# Patient Record
Sex: FEMALE | Race: WHITE | Employment: FULL TIME | URBAN - METROPOLITAN AREA
[De-identification: names, ages, dates, MRNs, and addresses within clinical notes are randomized per-mention and may not be internally consistent; named-entity substitution may affect disease eponyms.]

---

## 2017-01-03 ENCOUNTER — TRANSFERRED RECORDS (OUTPATIENT)
Dept: HEALTH INFORMATION MANAGEMENT | Facility: CLINIC | Age: 57
End: 2017-01-03

## 2017-01-05 ENCOUNTER — HOSPITAL ENCOUNTER (OUTPATIENT)
Dept: OCCUPATIONAL THERAPY | Facility: CLINIC | Age: 57
Setting detail: THERAPIES SERIES
End: 2017-01-05
Attending: FAMILY MEDICINE
Payer: COMMERCIAL

## 2017-01-05 PROCEDURE — 40000445 ZZHC STATISTIC OT VISIT, LYMPHEDEMA

## 2017-01-05 PROCEDURE — 97140 MANUAL THERAPY 1/> REGIONS: CPT | Mod: GO

## 2017-01-09 DIAGNOSIS — C50.812 MALIGNANT NEOPLASM OF OVERLAPPING SITES OF LEFT FEMALE BREAST (H): Primary | ICD-10-CM

## 2017-01-09 RX ORDER — TRAMADOL HYDROCHLORIDE 50 MG/1
50 TABLET ORAL EVERY 6 HOURS PRN
Qty: 60 TABLET | Refills: 1 | COMMUNITY
Start: 2017-01-09 | End: 2017-02-20

## 2017-01-09 NOTE — TELEPHONE ENCOUNTER
Patient called left message on her mobile cell phone that Dr. Jasso authorized a refill of her Tamadol. Patient informed that it was called in to [facility]. Kary Schultz RN

## 2017-01-26 ENCOUNTER — HOSPITAL ENCOUNTER (OUTPATIENT)
Dept: OCCUPATIONAL THERAPY | Facility: CLINIC | Age: 57
Setting detail: THERAPIES SERIES
End: 2017-01-26
Attending: FAMILY MEDICINE
Payer: COMMERCIAL

## 2017-01-26 PROCEDURE — 97140 MANUAL THERAPY 1/> REGIONS: CPT | Mod: GO

## 2017-01-26 PROCEDURE — 40000445 ZZHC STATISTIC OT VISIT, LYMPHEDEMA

## 2017-02-02 ENCOUNTER — HOSPITAL ENCOUNTER (OUTPATIENT)
Dept: OCCUPATIONAL THERAPY | Facility: CLINIC | Age: 57
Setting detail: THERAPIES SERIES
End: 2017-02-02
Attending: FAMILY MEDICINE
Payer: COMMERCIAL

## 2017-02-02 PROCEDURE — 40000445 ZZHC STATISTIC OT VISIT, LYMPHEDEMA

## 2017-02-02 PROCEDURE — 97140 MANUAL THERAPY 1/> REGIONS: CPT | Mod: GO

## 2017-02-06 ENCOUNTER — INFUSION THERAPY VISIT (OUTPATIENT)
Dept: INFUSION THERAPY | Facility: CLINIC | Age: 57
End: 2017-02-06
Attending: INTERNAL MEDICINE
Payer: COMMERCIAL

## 2017-02-06 DIAGNOSIS — C50.919 BREAST CANCER, FEMALE (H): Primary | ICD-10-CM

## 2017-02-06 PROCEDURE — 25000128 H RX IP 250 OP 636: Performed by: INTERNAL MEDICINE

## 2017-02-06 PROCEDURE — 96523 IRRIG DRUG DELIVERY DEVICE: CPT

## 2017-02-06 RX ORDER — HEPARIN SODIUM (PORCINE) LOCK FLUSH IV SOLN 100 UNIT/ML 100 UNIT/ML
5 SOLUTION INTRAVENOUS ONCE
Status: COMPLETED | OUTPATIENT
Start: 2017-02-06 | End: 2017-02-06

## 2017-02-06 RX ADMIN — SODIUM CHLORIDE, PRESERVATIVE FREE 5 ML: 5 INJECTION INTRAVENOUS at 14:34

## 2017-02-06 NOTE — PROGRESS NOTES
Infusion Nursing Note:  Kita Smith presents today for port fluh   Patient seen by provider today: No   present during visit today: Not Applicable.    Note: N/A.    Intravenous Access:  Implanted Port.    Treatment Conditions:  Not Applicable.      Post Infusion Assessment:  Blood return noted pre and post infusion.  Site patent and intact, free from redness, edema or discomfort.  No evidence of extravasations.  Access discontinued per protocol.    Discharge Plan:   AVS to patient via MYCHART.  Patient will return in 1 month for next appointment.   Patient discharged in stable condition accompanied by: self.  Departure Mode: Ambulatory.    Adriane Gonzalez RN

## 2017-02-09 ENCOUNTER — HOSPITAL ENCOUNTER (OUTPATIENT)
Dept: OCCUPATIONAL THERAPY | Facility: CLINIC | Age: 57
Setting detail: THERAPIES SERIES
End: 2017-02-09
Attending: FAMILY MEDICINE
Payer: COMMERCIAL

## 2017-02-09 PROCEDURE — 40000445 ZZHC STATISTIC OT VISIT, LYMPHEDEMA

## 2017-02-09 PROCEDURE — 97140 MANUAL THERAPY 1/> REGIONS: CPT | Mod: GO

## 2017-02-16 ENCOUNTER — HOSPITAL ENCOUNTER (OUTPATIENT)
Dept: OCCUPATIONAL THERAPY | Facility: CLINIC | Age: 57
Setting detail: THERAPIES SERIES
End: 2017-02-16
Attending: FAMILY MEDICINE
Payer: COMMERCIAL

## 2017-02-16 PROCEDURE — 97140 MANUAL THERAPY 1/> REGIONS: CPT | Mod: GO

## 2017-02-16 PROCEDURE — 40000445 ZZHC STATISTIC OT VISIT, LYMPHEDEMA

## 2017-02-17 ENCOUNTER — TELEPHONE (OUTPATIENT)
Dept: ONCOLOGY | Facility: CLINIC | Age: 57
End: 2017-02-17

## 2017-02-17 DIAGNOSIS — L60.9 LOOSE TOENAIL: Primary | ICD-10-CM

## 2017-02-17 DIAGNOSIS — C50.812 MALIGNANT NEOPLASM OF OVERLAPPING SITES OF LEFT FEMALE BREAST (H): ICD-10-CM

## 2017-02-20 RX ORDER — TRAMADOL HYDROCHLORIDE 50 MG/1
50 TABLET ORAL EVERY 6 HOURS PRN
Qty: 60 TABLET | Refills: 1 | COMMUNITY
Start: 2017-02-20 | End: 2017-03-07

## 2017-02-20 NOTE — TELEPHONE ENCOUNTER
Pt called requesting a refill of her Tramadol for 2 months to [facility] in [place].    Pt also reports having nail lifting on her both of her big toes.    Encouraged Pt to see a podiatrist for her toenail lifting.    Will see if  will approve reorder of Pt's Tramadol as  is out-of-the-office.

## 2017-02-23 ENCOUNTER — HOSPITAL ENCOUNTER (OUTPATIENT)
Dept: OCCUPATIONAL THERAPY | Facility: CLINIC | Age: 57
Setting detail: THERAPIES SERIES
End: 2017-02-23
Attending: FAMILY MEDICINE
Payer: COMMERCIAL

## 2017-02-23 ENCOUNTER — TRANSFERRED RECORDS (OUTPATIENT)
Dept: HEALTH INFORMATION MANAGEMENT | Facility: CLINIC | Age: 57
End: 2017-02-23

## 2017-02-23 PROCEDURE — 97140 MANUAL THERAPY 1/> REGIONS: CPT | Mod: GO

## 2017-02-23 PROCEDURE — 40000445 ZZHC STATISTIC OT VISIT, LYMPHEDEMA

## 2017-02-28 ENCOUNTER — OFFICE VISIT (OUTPATIENT)
Dept: PODIATRY | Facility: CLINIC | Age: 57
End: 2017-02-28
Payer: COMMERCIAL

## 2017-02-28 VITALS — BODY MASS INDEX: 21.55 KG/M2 | WEIGHT: 134.1 LBS | HEIGHT: 66 IN

## 2017-02-28 DIAGNOSIS — L60.3 ONYCHODYSTROPHY: Primary | ICD-10-CM

## 2017-02-28 PROCEDURE — 99203 OFFICE O/P NEW LOW 30 MIN: CPT | Performed by: PODIATRIST

## 2017-02-28 RX ORDER — CHOLECALCIFEROL (VITAMIN D3) 1250 MCG
50000 CAPSULE ORAL
Status: ON HOLD | COMMUNITY
Start: 2015-06-24 | End: 2017-03-16

## 2017-02-28 RX ORDER — MULTIVITAMIN,THER AND MINERALS
TABLET ORAL
COMMUNITY
Start: 2015-06-24 | End: 2017-07-21

## 2017-02-28 RX ORDER — ZOLPIDEM TARTRATE 10 MG/1
10 TABLET ORAL
COMMUNITY
Start: 2017-01-31 | End: 2017-07-21

## 2017-02-28 RX ORDER — AMITRIPTYLINE HYDROCHLORIDE 10 MG/1
10-20 TABLET ORAL
COMMUNITY
Start: 2016-05-16 | End: 2017-07-21

## 2017-02-28 RX ORDER — MULTIVITAMIN WITH IRON
TABLET ORAL
COMMUNITY
Start: 2015-06-24

## 2017-02-28 NOTE — NURSING NOTE
"Chief Complaint   Patient presents with     Toenail     Bilateral 1st toenails lifting off nail bed       Initial Ht 5' 6\" (1.676 m)  Wt 134 lb 1.6 oz (60.8 kg)  LMP 12/01/2006  BMI 21.64 kg/m2 Estimated body mass index is 21.64 kg/(m^2) as calculated from the following:    Height as of this encounter: 5' 6\" (1.676 m).    Weight as of this encounter: 134 lb 1.6 oz (60.8 kg).  Medication Reconciliation: genaro Link MA February 28, 2017 3:25 PM      "

## 2017-02-28 NOTE — MR AVS SNAPSHOT
After Visit Summary   2/28/2017    Kita Smith    MRN: [id number]           Patient Information     Date Of Birth          1960        Visit Information        Provider Department      2/28/2017 3:20 PM Hira Meraz DPM [facility]        Today's Diagnoses     Onychodystrophy    -  1      Care Instructions    Follow up if nails are not improving.          Follow-ups after your visit        Follow-up notes from your care team     Return if symptoms worsen or fail to improve.      Your next 10 appointments already scheduled     Mar 07, 2017  8:00 AM CST   Level 1 with  INFUSION CHAIR 4   [facility] ([facility])    [place] [id number]  [place] [address]   [phone]            Mar 07, 2017  8:30 AM CST   Return Visit with Preeti Jasso MD   [facility] ([facility])    [facility]  [address]  [place] [address]   [phone]            Mar 09, 2017  2:15 PM CST   Lymphedema Treatment with Tamar Elias, OT   [facility] Lymphedema OT ([facility])    [address]  [address]  [facility] [address]   [phone]            Mar 23, 2017  2:15 PM CDT   Lymphedema Treatment with Tamar Elias, OT   [facility] Lymphedema OT ([facility])    [address]  [address]  [facility] [address]   [phone]            May 26, 2017  3:30 PM CDT   Level O with  INFUSION CHAIR 2   [facility] ([facility])    [facility]  [address]  [place] [address]   [phone]            May 26, 2017  4:00 PM CDT   Return Visit with Preeti Jasso MD   [facility] ([facility])    [facility]  [address]  [place] [address]   [phone]  "             Who to contact     If you have questions or need follow up information about today's clinic visit or your schedule please contact [facility] directly at [phone].  Normal or non-critical lab and imaging results will be communicated to you by MyChart, letter or phone within 4 business days after the clinic has received the results. If you do not hear from us within 7 days, please contact the clinic through MyChart or phone. If you have a critical or abnormal lab result, we will notify you by phone as soon as possible.  Submit refill requests through [employer] or call your pharmacy and they will forward the refill request to us. Please allow 3 business days for your refill to be completed.          Additional Information About Your Visit        Xecced[employer]ivela Information     [employer] lets you send messages to your doctor, view your test results, renew your prescriptions, schedule appointments and more. To sign up, go to [email or web address] . Click on \"Log in\" on the left side of the screen, which will take you to the Welcome page. Then click on \"Sign up Now\" on the right side of the page.     You will be asked to enter the access code listed below, as well as some personal information. Please follow the directions to create your username and password.     Your access code is: [email or web address]  Expires: 2017  3:38 PM     Your access code will  in 90 days. If you need help or a new code, please call your [facility] clinic or [phone].        Care EveryWhere ID     This is your Care EveryWhere ID. This could be used by other organizations to access your [facility] medical records  [id number]        Your Vitals Were     Height Last Period BMI (Body Mass Index)             5' 6\" (1.676 m) 2006 21.64 kg/m2          Blood Pressure from Last 3 Encounters:   16 119/81   16 119/80   16 116/76    Weight from Last 3 Encounters:   17 134 lb 1.6 oz (60.8 kg)   16 " 143 lb 6.4 oz (65 kg)   11/25/16 143 lb 6.4 oz (65 kg)              Today, you had the following     No orders found for display       Primary Care Provider Office Phone # Fax #    Checo Crockett [phone]       [facility] [address] [place] [address] [facility] [address]        Thank you!     Thank you for choosing [facility]  for your care. Our goal is always to provide you with excellent care. Hearing back from our patients is one way we can continue to improve our services. Please take a few minutes to complete the written survey that you may receive in the mail after your visit with us. Thank you!             Your Updated Medication List - Protect others around you: Learn how to safely use, store and throw away your medicines at www.disposemymeds.org.          This list is accurate as of: 2/28/17  3:38 PM.  Always use your most recent med list.                   Brand Name Dispense Instructions for use    amitriptyline 10 MG tablet    ELAVIL     Take 10-20 mg by mouth Reported on 2/28/2017       B Complex-C Tabs          calcium carbonate 500 MG chewable tablet    TUMS     Take 1 chew tab by mouth daily Reported on 2/28/2017       coenzyme Q-10 10 MG Caps      Take 200 mg by mouth       dexamethasone 4 MG tablet    DECADRON    10 tablet    Take 2 tablets (8 mg) by mouth 2 times daily (with meals) Start evening AFTER Docetaxel dose and continue for 4 additional doses.       FIRST-MOUTHWASH BLM Susp     237 mL    Swish and swallow 5-10 mLs in mouth every 6 hours as needed       lidocaine-prilocaine cream    EMLA    30 g    Apply to port site 1 hour prior to accessing port       LORazepam 0.5 MG tablet    ATIVAN    30 tablet    Take 1 tablet (0.5 mg) by mouth every 4 hours as needed (Anxiety, Nausea/Vomiting or Sleep)       olopatadine 0.1 % ophthalmic solution    PATANOL     Place 1 drop into both eyes 2 times daily as needed for allergies       omeprazole 40 MG  capsule    priLOSEC    90 capsule    Take 1 capsule (40 mg) by mouth daily Take 30-60 minutes before a meal.       ondansetron 8 MG tablet    ZOFRAN    30 tablet    Take 1 tablet (8 mg) by mouth every 8 hours as needed for nausea       traMADol 50 MG tablet    ULTRAM    60 tablet    Take 1 tablet (50 mg) by mouth every 6 hours as needed for pain (maximum 8 tablets per day)       vitamin  s/Minerals Tabs          vitamin D3 91675 UNITS capsule    CHOLECALCIFEROL     Take 50,000 Units by mouth       ZOFRAN ODT PO      Take 4 mg by mouth Reported on 2/28/2017       * ZOLPIDEM TARTRATE PO      Take 10 mg by mouth At Bedtime Reported on 2/28/2017       * zolpidem 10 MG tablet    AMBIEN     Take 10 mg by mouth       * Notice:  This list has 2 medication(s) that are the same as other medications prescribed for you. Read the directions carefully, and ask your doctor or other care provider to review them with you.

## 2017-02-28 NOTE — PROGRESS NOTES
PATIENT HISTORY:  Kita Smith is a 56 year old female who presents to clinic for b/l great toenail loosening, discoloration.  Pt completed another round of chemotherapy, ended last November.  Changes noted around that time.  Denies pain today.  She reports neuropathy.  Reports epsom salt soaks, massage.  No change.  Wondering if anything needs to be done.       Review of Systems:  Patient denies fever, chills, rash, wound, stiffness, limping, weakness, heart burn, blood in stool, chest pain with activity, calf pain when walking, shortness of breath with activity, chronic cough, easy bleeding/bruising, swelling of ankles, excessive thirst, depression.  Patient admits to fatigue, numbness, anxiety.     PAST MEDICAL HISTORY:   Past Medical History   Diagnosis Date     Basal cell carcinoma      Breast CA (H)      Depression with anxiety      Histoplasmosis      Malignant melanoma (H)      PONV (postoperative nausea and vomiting)         PAST SURGICAL HISTORY:   Past Surgical History   Procedure Laterality Date     C rad resec tonsil/pillars       General surgery                           date:   &     C section     Hemorrhoidectomy       Mastectomy        section       times 2     Proctoplasty  2013     Procedure: PROCTOPLASTY;  PROCTOPLASTY TO REPAIR ANAL FISSURE AND STENOSIS;  Surgeon: Goldberg, Stanley Morton, MD;  Location:  [facility]     Breast surgery       bilat mastectomy       Hc revise breast reconstruction       Cl aff surgical pathology       left wrist     Melanoma removal[       right sided neck     Davinci hysterectomy total, bilateral salpingo-oophorectomy, combined  2013     Procedure: COMBINED DAVINCI HYSTERECTOMY TOTAL, SALPINGO-OOPHORECTOMY;  ROBOTIC ASSISTED TOTAL LAPAROSCOPIC HYSTERECTOMY, BILATERAL SALPINGO OOPHORECTOMY, PARTIAL VULVECTOMY ;  Surgeon: Hira Jenkins MD;  Location:  OR     Vulvectomy simple  2013     Procedure: VULVECTOMY  SIMPLE;;  Surgeon: Hira Jenkins MD;  Location:  OR     Mohs micrographic procedure       Biopsy of skin lesion       Dissect lymph node axilla Left 6/27/2016     Procedure: DISSECT LYMPH NODE AXILLA;  Surgeon: Hebert Driscoll MD;  Location:  OR     Insert port vascular access Right 6/27/2016     Procedure: INSERT PORT VASCULAR ACCESS;  Surgeon: Hebert Driscoll MD;  Location:  OR        MEDICATIONS:   Current Outpatient Prescriptions:      B Complex-C TABS, , Disp: , Rfl:      vitamin D3 (CHOLECALCIFEROL) 43802 UNITS capsule, Take 50,000 Units by mouth, Disp: , Rfl:      coenzyme Q-10 10 MG CAPS, Take 200 mg by mouth, Disp: , Rfl:      vitamin  s/Minerals TABS, , Disp: , Rfl:      zolpidem (AMBIEN) 10 MG tablet, Take 10 mg by mouth, Disp: , Rfl:      traMADol (ULTRAM) 50 MG tablet, Take 1 tablet (50 mg) by mouth every 6 hours as needed for pain (maximum 8 tablets per day), Disp: 60 tablet, Rfl: 1     LORazepam (ATIVAN) 0.5 MG tablet, Take 1 tablet (0.5 mg) by mouth every 4 hours as needed (Anxiety, Nausea/Vomiting or Sleep), Disp: 30 tablet, Rfl: 2     lidocaine-prilocaine (EMLA) cream, Apply to port site 1 hour prior to accessing port, Disp: 30 g, Rfl: 3     olopatadine (PATANOL) 0.1 % ophthalmic solution, Place 1 drop into both eyes 2 times daily as needed for allergies, Disp: , Rfl:      amitriptyline (ELAVIL) 10 MG tablet, Take 10-20 mg by mouth Reported on 2/28/2017, Disp: , Rfl:      dexamethasone (DECADRON) 4 MG tablet, Take 2 tablets (8 mg) by mouth 2 times daily (with meals) Start evening AFTER Docetaxel dose and continue for 4 additional doses. (Patient not taking: Reported on 2/28/2017), Disp: 10 tablet, Rfl: 0     calcium carbonate (TUMS) 500 MG chewable tablet, Take 1 chew tab by mouth daily Reported on 2/28/2017, Disp: , Rfl:      omeprazole (PRILOSEC) 40 MG capsule, Take 1 capsule (40 mg) by mouth daily Take 30-60 minutes before a meal. (Patient not taking: Reported on  "2/28/2017), Disp: 90 capsule, Rfl: 3     DPH-Lido-AlHydr-MgHydr-Simeth (FIRST-MOUTHWASH BLM) SUSP, Swish and swallow 5-10 mLs in mouth every 6 hours as needed (Patient not taking: Reported on 2/28/2017), Disp: 237 mL, Rfl: 1     ondansetron (ZOFRAN) 8 MG tablet, Take 1 tablet (8 mg) by mouth every 8 hours as needed for nausea (Patient not taking: Reported on 2/28/2017), Disp: 30 tablet, Rfl: 3     Ondansetron (ZOFRAN ODT PO), Take 4 mg by mouth Reported on 2/28/2017, Disp: , Rfl:      ZOLPIDEM TARTRATE PO, Take 10 mg by mouth At Bedtime Reported on 2/28/2017, Disp: , Rfl:      ALLERGIES:    Allergies   Allergen Reactions     Percocet [Oxycodone-Acetaminophen] Nausea     Adhesive Tape Blisters     REDNESS,  bandaides     Hydrocodone Nausea and Vomiting     Wound Dressing Adhesive         SOCIAL HISTORY:   Social History     Social History     Marital status: Single     Spouse name: N/A     Number of children: N/A     Years of education: N/A     Occupational History     Not on file.     Social History Main Topics     Smoking status: Former Smoker     Packs/day: 1.50     Years: 4.00     Types: Cigarettes     Quit date: 1/11/1980     Smokeless tobacco: Never Used     Alcohol use Yes      Comment: occasionally     Drug use: No     Sexual activity: Not on file     Other Topics Concern     Not on file     Social History Narrative        FAMILY HISTORY:   Family History   Problem Relation Age of Onset     CANCER Mother      brain     Other Cancer Mother      Cancer - colorectal Father      Hypertension Father      Colon Cancer Father      CANCER Maternal Aunt      breast     CANCER Other      breast in cousin     Other Cancer Sister      Other Cancer Maternal Grandfather      Breast Cancer Paternal Grandmother      Other Cancer Paternal Grandfather         EXAM:Vitals: Ht 5' 6\" (1.676 m)  Wt 134 lb 1.6 oz (60.8 kg)  LMP 12/01/2006  BMI 21.64 kg/m2  BMI= Body mass index is 21.64 kg/(m^2).    General appearance: Patient is " alert and fully cooperative with history & exam.  No sign of distress is noted during the visit.     Psychiatric: Affect is pleasant & appropriate.  Patient appears motivated to improve health.     Respiratory: Breathing is regular & unlabored while sitting.     HEENT: Hearing is intact to spoken word.  Speech is clear.  No gross evidence of visual impairment that would impact ambulation.     Dermatologic: b/l great toenails with some distal lifting from skin, which appears intact underneath.  Related discoloration, mild subungual bruising.  Overall nails appear well adhered.  No paronychia or evidence of soft tissue infection is noted.     Vascular: DP & PT pulses are intact & regular bilaterally.  No significant edema or varicosities noted.  CFT and skin temperature are normal to both lower extremities.     Neurologic: Lower extremity sensation is intact to light touch.  No evidence of weakness or contracture in the lower extremities.  No evidence of neuropathy.     Musculoskeletal: Patient is ambulatory without assistive device or brace.  No gross ankle deformity noted.  No foot or ankle joint effusion is noted.     ASSESSMENT: b/l onychodystrophy     PLAN:  Reviewed patient's chart in epic.  Discussed condition and treatment options including pros and cons.    Discussed nail changes are likely permanent from chemo.  Discussed risk of losing the nails.  Advised she monitor for clearing as new nails grow, which can take several months.  F/u as needed if not clearing/improving.  Discussed sometimes nail avulsion to eval underlying skin is needed, but I would avoid this for her, especially given recent chemo.  Pt will monitor.  No intervention required at this time.  Call with concerns.     Hira Meraz, IVANA, FACFAS

## 2017-03-07 ENCOUNTER — INFUSION THERAPY VISIT (OUTPATIENT)
Dept: INFUSION THERAPY | Facility: CLINIC | Age: 57
End: 2017-03-07
Attending: INTERNAL MEDICINE
Payer: COMMERCIAL

## 2017-03-07 ENCOUNTER — ONCOLOGY VISIT (OUTPATIENT)
Dept: ONCOLOGY | Facility: CLINIC | Age: 57
End: 2017-03-07
Attending: INTERNAL MEDICINE
Payer: COMMERCIAL

## 2017-03-07 ENCOUNTER — HOSPITAL ENCOUNTER (OUTPATIENT)
Facility: CLINIC | Age: 57
Setting detail: SPECIMEN
Discharge: HOME | End: 2017-03-07
Attending: INTERNAL MEDICINE | Admitting: INTERNAL MEDICINE
Payer: COMMERCIAL

## 2017-03-07 VITALS
TEMPERATURE: 99.1 F | BODY MASS INDEX: 21.47 KG/M2 | HEART RATE: 72 BPM | SYSTOLIC BLOOD PRESSURE: 138 MMHG | DIASTOLIC BLOOD PRESSURE: 95 MMHG | OXYGEN SATURATION: 94 % | RESPIRATION RATE: 16 BRPM | WEIGHT: 133 LBS

## 2017-03-07 VITALS
TEMPERATURE: 99.1 F | BODY MASS INDEX: 21.43 KG/M2 | RESPIRATION RATE: 16 BRPM | DIASTOLIC BLOOD PRESSURE: 95 MMHG | WEIGHT: 132.8 LBS | HEART RATE: 72 BPM | SYSTOLIC BLOOD PRESSURE: 138 MMHG | OXYGEN SATURATION: 94 %

## 2017-03-07 DIAGNOSIS — C50.812 MALIGNANT NEOPLASM OF OVERLAPPING SITES OF LEFT FEMALE BREAST (H): ICD-10-CM

## 2017-03-07 DIAGNOSIS — C50.919 METASTATIC BREAST CANCER: ICD-10-CM

## 2017-03-07 DIAGNOSIS — C50.812 MALIGNANT NEOPLASM OF OVERLAPPING SITES OF LEFT FEMALE BREAST (H): Primary | ICD-10-CM

## 2017-03-07 LAB
ALBUMIN SERPL-MCNC: 3.6 G/DL (ref 3.4–5)
ALP SERPL-CCNC: 64 U/L (ref 40–150)
ALT SERPL W P-5'-P-CCNC: 18 U/L (ref 0–50)
ANION GAP SERPL CALCULATED.3IONS-SCNC: 6 MMOL/L (ref 3–14)
AST SERPL W P-5'-P-CCNC: 16 U/L (ref 0–45)
BASOPHILS # BLD AUTO: 0 10E9/L (ref 0–0.2)
BASOPHILS NFR BLD AUTO: 0.7 %
BILIRUB SERPL-MCNC: 0.4 MG/DL (ref 0.2–1.3)
BUN SERPL-MCNC: 14 MG/DL (ref 7–30)
CALCIUM SERPL-MCNC: 8.7 MG/DL (ref 8.5–10.1)
CANCER AG27-29 SERPL-ACNC: 21 U/ML (ref 0–39)
CHLORIDE SERPL-SCNC: 106 MMOL/L (ref 94–109)
CO2 SERPL-SCNC: 27 MMOL/L (ref 20–32)
CREAT SERPL-MCNC: 0.8 MG/DL (ref 0.52–1.04)
DIFFERENTIAL METHOD BLD: ABNORMAL
EOSINOPHIL # BLD AUTO: 0.1 10E9/L (ref 0–0.7)
EOSINOPHIL NFR BLD AUTO: 2.1 %
ERYTHROCYTE [DISTWIDTH] IN BLOOD BY AUTOMATED COUNT: 12.5 % (ref 10–15)
GFR SERPL CREATININE-BSD FRML MDRD: 74 ML/MIN/1.7M2
GLUCOSE SERPL-MCNC: 80 MG/DL (ref 70–99)
HCT VFR BLD AUTO: 37.7 % (ref 35–47)
HGB BLD-MCNC: 13.1 G/DL (ref 11.7–15.7)
IMM GRANULOCYTES # BLD: 0 10E9/L (ref 0–0.4)
IMM GRANULOCYTES NFR BLD: 0 %
LYMPHOCYTES # BLD AUTO: 0.9 10E9/L (ref 0.8–5.3)
LYMPHOCYTES NFR BLD AUTO: 31.3 %
MCH RBC QN AUTO: 32.3 PG (ref 26.5–33)
MCHC RBC AUTO-ENTMCNC: 34.7 G/DL (ref 31.5–36.5)
MCV RBC AUTO: 93 FL (ref 78–100)
MONOCYTES # BLD AUTO: 0.4 10E9/L (ref 0–1.3)
MONOCYTES NFR BLD AUTO: 14.2 %
NEUTROPHILS # BLD AUTO: 1.5 10E9/L (ref 1.6–8.3)
NEUTROPHILS NFR BLD AUTO: 51.7 %
NRBC # BLD AUTO: 0 10*3/UL
NRBC BLD AUTO-RTO: 0 /100
PLATELET # BLD AUTO: 149 10E9/L (ref 150–450)
POTASSIUM SERPL-SCNC: 4.2 MMOL/L (ref 3.4–5.3)
PROT SERPL-MCNC: 6.9 G/DL (ref 6.8–8.8)
RBC # BLD AUTO: 4.06 10E12/L (ref 3.8–5.2)
SODIUM SERPL-SCNC: 139 MMOL/L (ref 133–144)
WBC # BLD AUTO: 2.8 10E9/L (ref 4–11)

## 2017-03-07 PROCEDURE — 85025 COMPLETE CBC W/AUTO DIFF WBC: CPT | Performed by: INTERNAL MEDICINE

## 2017-03-07 PROCEDURE — 80053 COMPREHEN METABOLIC PANEL: CPT | Performed by: INTERNAL MEDICINE

## 2017-03-07 PROCEDURE — 99211 OFF/OP EST MAY X REQ PHY/QHP: CPT

## 2017-03-07 PROCEDURE — 25000128 H RX IP 250 OP 636: Performed by: INTERNAL MEDICINE

## 2017-03-07 PROCEDURE — 99215 OFFICE O/P EST HI 40 MIN: CPT | Performed by: INTERNAL MEDICINE

## 2017-03-07 PROCEDURE — 36591 DRAW BLOOD OFF VENOUS DEVICE: CPT

## 2017-03-07 PROCEDURE — 86300 IMMUNOASSAY TUMOR CA 15-3: CPT | Performed by: INTERNAL MEDICINE

## 2017-03-07 RX ORDER — TRAMADOL HYDROCHLORIDE 50 MG/1
50 TABLET ORAL EVERY 6 HOURS PRN
Qty: 60 TABLET | Refills: 1 | Status: SHIPPED | OUTPATIENT
Start: 2017-03-07 | End: 2017-05-01

## 2017-03-07 RX ORDER — HEPARIN SODIUM (PORCINE) LOCK FLUSH IV SOLN 100 UNIT/ML 100 UNIT/ML
5 SOLUTION INTRAVENOUS ONCE
Status: COMPLETED | OUTPATIENT
Start: 2017-03-07 | End: 2017-03-07

## 2017-03-07 RX ORDER — HEPARIN SODIUM (PORCINE) LOCK FLUSH IV SOLN 100 UNIT/ML 100 UNIT/ML
5 SOLUTION INTRAVENOUS ONCE
Status: CANCELLED
Start: 2017-03-07 | End: 2017-03-07

## 2017-03-07 RX ORDER — TRAMADOL HYDROCHLORIDE 50 MG/1
50 TABLET ORAL EVERY 6 HOURS PRN
Qty: 60 TABLET | Refills: 1 | Status: SHIPPED | OUTPATIENT
Start: 2017-03-07 | End: 2017-03-07

## 2017-03-07 RX ORDER — ANASTROZOLE 1 MG/1
1 TABLET ORAL DAILY
Qty: 90 TABLET | Refills: 3 | Status: SHIPPED | OUTPATIENT
Start: 2017-03-07 | End: 2017-10-20

## 2017-03-07 RX ADMIN — SODIUM CHLORIDE, PRESERVATIVE FREE 5 ML: 5 INJECTION INTRAVENOUS at 08:20

## 2017-03-07 ASSESSMENT — PAIN SCALES - GENERAL
PAINLEVEL: MILD PAIN (2)
PAINLEVEL: MILD PAIN (2)

## 2017-03-07 NOTE — MR AVS SNAPSHOT
After Visit Summary   3/7/2017    Kita Smith    MRN: [id number]           Patient Information     Date Of Birth          1960        Visit Information        Provider Department      3/7/2017 8:00 AM  INFUSION CHAIR 4 [facility]        Today's Diagnoses     Metastatic breast cancer (H)           Follow-ups after your visit        Your next 10 appointments already scheduled     Mar 09, 2017  2:15 PM CST   Lymphedema Treatment with Tamar Elias, OT   [facility] Lymphedema OT ([facility])    [address] [place] [address]  [address]  [facility] [address]   [phone]            Mar 23, 2017  2:15 PM CDT   Lymphedema Treatment with Tamar Elias, OT   [facility] Lymphedema [facility] ([facility])    [address] [place] [address]  [address]  [facility] [address]   [phone]            May 26, 2017  3:30 PM CDT   Level O with  INFUSION CHAIR 2   [facility] ([facility])    [facility]  [address]  [facility] [address]   [phone]            May 26, 2017  4:00 PM CDT   Return Visit with Preeti Jasso MD   [facility] ([facility])    [facility]  [address]  [facility] [address]   [phone]              Who to contact     If you have questions or need follow up information about today's clinic visit or your schedule please contact [facility] directly at [phone].  Normal or non-critical lab and imaging results will be communicated to you by MyChart, letter or phone within 4 business days after the clinic has received the results. If you do not hear from us within 7 days, please contact the clinic through MyChart or phone. If you have a critical or abnormal lab result, we will notify you by phone as soon as possible.  Submit refill requests through [employer] or  "call your pharmacy and they will forward the refill request to us. Please allow 3 business days for your refill to be completed.          Additional Information About Your Visit        MyChart Information     [employer] lets you send messages to your doctor, view your test results, renew your prescriptions, schedule appointments and more. To sign up, go to [email or web address] . Click on \"Log in\" on the left side of the screen, which will take you to the Welcome page. Then click on \"Sign up Now\" on the right side of the page.     You will be asked to enter the access code listed below, as well as some personal information. Please follow the directions to create your username and password.     Your access code is: [email or web address]  Expires: 2017  3:38 PM     Your access code will  in 90 days. If you need help or a new code, please call your [facility] clinic or [phone].        Care EveryWhere ID     This is your Care EveryWhere ID. This could be used by other organizations to access your [facility] medical records  [id number]        Your Vitals Were     Pulse Temperature Respirations Last Period Pulse Oximetry BMI (Body Mass Index)    72 99.1  F (37.3  C) (Oral) 16 2006 94% 21.47 kg/m2       Blood Pressure from Last 3 Encounters:   17 (!) 138/95   17 (!) 138/95   16 119/81    Weight from Last 3 Encounters:   17 60.2 kg (132 lb 12.8 oz)   17 60.3 kg (133 lb)   17 60.8 kg (134 lb 1.6 oz)              We Performed the Following     Ca27.29  breast tumor marker     CBC with platelets differential     Comprehensive metabolic panel          Today's Medication Changes          These changes are accurate as of: 3/7/17  8:59 AM.  If you have any questions, ask your nurse or doctor.               Start taking these medicines.        Dose/Directions    anastrozole 1 MG tablet   Commonly known as:  ARIMIDEX   Used for:  Malignant neoplasm of overlapping sites of left female " breast (H)   Started by:  Preeti Jasso MD        Dose:  1 mg   Take 1 tablet (1 mg) by mouth daily   Quantity:  90 tablet   Refills:  3            Where to get your medicines      These medications were sent to [id number] - [facility] [place] [address] [facility] [address] [facility] [address]  [address] [facility], [place] [address]     Phone:  [phone]     anastrozole 1 MG tablet         Some of these will need a paper prescription and others can be bought over the counter.  Ask your nurse if you have questions.     Bring a paper prescription for each of these medications     traMADol 50 MG tablet                Primary Care Provider Office Phone # Fax #    Checo Crockett [phone]       [facility] [address] [place] [address] [place] [address]        Thank you!     Thank you for choosing [facility]  for your care. Our goal is always to provide you with excellent care. Hearing back from our patients is one way we can continue to improve our services. Please take a few minutes to complete the written survey that you may receive in the mail after your visit with us. Thank you!             Your Updated Medication List - Protect others around you: Learn how to safely use, store and throw away your medicines at www.disposemymeds.org.          This list is accurate as of: 3/7/17  8:59 AM.  Always use your most recent med list.                   Brand Name Dispense Instructions for use    amitriptyline 10 MG tablet    ELAVIL     Take 10-20 mg by mouth Reported on 2/28/2017       anastrozole 1 MG tablet    ARIMIDEX    90 tablet    Take 1 tablet (1 mg) by mouth daily       B Complex-C Tabs          calcium carbonate 500 MG chewable tablet    TUMS     Take 1 chew tab by mouth daily Reported on 2/28/2017       coenzyme Q-10 10 MG Caps      Take 200 mg by mouth       dexamethasone 4 MG tablet    DECADRON    10 tablet    Take 2  tablets (8 mg) by mouth 2 times daily (with meals) Start evening AFTER Docetaxel dose and continue for 4 additional doses.       FIRST-MOUTHWASH BLM Susp     237 mL    Swish and swallow 5-10 mLs in mouth every 6 hours as needed       lidocaine-prilocaine cream    EMLA    30 g    Apply to port site 1 hour prior to accessing port       LORazepam 0.5 MG tablet    ATIVAN    30 tablet    Take 1 tablet (0.5 mg) by mouth every 4 hours as needed (Anxiety, Nausea/Vomiting or Sleep)       olopatadine 0.1 % ophthalmic solution    PATANOL     Place 1 drop into both eyes 2 times daily as needed for allergies       omeprazole 40 MG capsule    priLOSEC    90 capsule    Take 1 capsule (40 mg) by mouth daily Take 30-60 minutes before a meal.       ondansetron 8 MG tablet    ZOFRAN    30 tablet    Take 1 tablet (8 mg) by mouth every 8 hours as needed for nausea       traMADol 50 MG tablet    ULTRAM    60 tablet    Take 1 tablet (50 mg) by mouth every 6 hours as needed for pain (maximum 8 tablets per day)       vitamin  s/Minerals Tabs          vitamin D3 92217 UNITS capsule    CHOLECALCIFEROL     Take 50,000 Units by mouth       ZOFRAN ODT PO      Take 4 mg by mouth Reported on 2/28/2017       * ZOLPIDEM TARTRATE PO      Take 10 mg by mouth At Bedtime Reported on 2/28/2017       * zolpidem 10 MG tablet    AMBIEN     Take 10 mg by mouth       * Notice:  This list has 2 medication(s) that are the same as other medications prescribed for you. Read the directions carefully, and ask your doctor or other care provider to review them with you.

## 2017-03-07 NOTE — PROGRESS NOTES
"Kita Smith is a 56 year old female who presents for:  Chief Complaint   Patient presents with     Oncology Clinic Visit     F/U Breast Ca         Initial Vitals:  LMP 12/01/2006 Estimated body mass index is 21.64 kg/(m^2) as calculated from the following:    Height as of 2/28/17: 1.676 m (5' 6\").    Weight as of 2/28/17: 60.8 kg (134 lb 1.6 oz).. There is no height or weight on file to calculate BSA. BP completed using cuff size: regular  Data Unavailable Patient's last menstrual period was 12/01/2006. Allergies and medications reviewed.     Medications: Medication refills not needed today.  Pharmacy name entered into [employer]:    [facility] [address] [place] [address] [id number]  [facility] [place], [place] - [address]    Comments: None    6 minutes for nursing intake (face to face time)   Jerica Montalvo CMA    DISCHARGE PLAN:  1.) Patient to be scheduled for Bone density- prep instructions reviewed with patient  2.) Patient to be scheduled for port removal per surgery  3.) Physical therapy referral-Rehab scheduling to call patient  4.) Patient to be scheduled for labs and follow up with Dr. Jasso in 1 month.  5.) Patient given information on [employer]  Next appointments: See patient instruction section  Departure Mode: Ambulatory  Accompanied by: self  10 minutes for nursing discharge (face to face time)   Kary Schultz RN          "

## 2017-03-07 NOTE — PROGRESS NOTES
Infusion Nursing Note:  Kita Smith presents today for port labs.    Patient seen by provider today: Yes: Dr. Jasso   present during visit today: Not Applicable.    Note: N/A.    Intravenous Access:  Labs drawn without difficulty.  Implanted Port.    Treatment Conditions:  Not Applicable.      Post Infusion Assessment:  Site patent and intact, free from redness, edema or discomfort.  No evidence of extravasations.  Access discontinued per protocol.    Discharge Plan:   Patient discharged in stable condition accompanied by: self.  Departure Mode: Ambulatory to [facility] for clinic exam.    Yaquelin Aguilar RN

## 2017-03-07 NOTE — LETTER
March 7, 2017      RE: Kita Smith  [address] [place] [address]      [facility]    Hematology/Oncology Established Patient Follow-up Note      Today's Date: 03/07/2017    Reason for Follow-up: history of breast cancer and melanoma    HISTORY OF PRESENT ILLNESS: Kita Smith is a 56 year old female who is being followed for adenocarcinoma of the breast as well as melanoma. She was previously followed by Dr. Minor.  Kita is known to be BRCA-2 positive. She has undergone bilateral mastectomy.  She has also undergone hysterectomy and bilateral salpingo-oophorectomy on 04/06/2013 for prophylaxis of ovarian and uterine cancer.     She first presented with multifocal lesions in the left breast in 2006. She underwent left mastectomy on 01/28/2006 demonstrating multifocal infiltrating ductal adenocarcinoma, grade 2, with DCIS. She had 3 separate lesions measuring 1.5 cm, 0.5 cm, and 0.4 cm. All lymph nodes were negative. The tumor was ER/SC positive and HER-2 negative. She received 4 cycles of dose-dense Adriamycin and Cytoxan and was then on tamoxifen through 2012 when it was discontinued.     In the summer of 2012, Kita noted a hyperpigmented lesion over the right neck which was rapidly growing and changing over 1-2 months. The lesion was excised by Dr. Nataliia Baron. The lesion was a Santiago level III melanoma Breslow depth 0.5 mm.   Stage IA.  A wide excision was subsequently performed by Dr. Nataliia Baron, with 1 cm margins and no evidence of residual disease. The patient was subsequently referred to Dr. Childers. A PET-CT showed no evidence of metastatic disease. 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 Bazan of [facility] in March 2016.  This was stage 0.    On a CT scan study done on 10/12/2010 she was noted to have multiple bilateral pulmonary nodules, which were felt to be  granulomatous. These have been followed; these were PET negative. Her most recent CT of the chest done 04/05/2013 showed that the tiny pulmonary nodules were stable over 3 years and therefore these are no longer followed on a routine basis.      PET-CT in June 2016 showed left axillary lymphadenopathy and biopsy of lymph node showed metastatic breast carcinoma, consistent with recurrence.  On 6/27/16, she underwent left lateral breast lesion excision and left axillary dissection.  Pathology showed invasive ductal carcinoma, grade 3, tumor size 1.5 cm +LVI, 6 of 31 lymph nodes were involved, ER positive (75%), TN positive (5%), HER-2 negative.  4 of 29 lymph node were positive in the axillary dissection.  There was excision of a second breast nodule that also had metastatic breast cancer.      She began chemotherapy on 8/11/16, and completed docetaxel+carboplatin x 6 cycles on 11/25/16.  She completed radiation in late February 2017.        INTERIM HISTORY:  Kita comes in for follow-up today.  She has finished radiation.  She says that it got harder towards the end with fatigue.  She wants her port removed.  She still has pain at her left arm and back ever since her surgery, and continues to take tramadol, which helps.        REVIEW OF SYSTEMS:   14 point ROS was reviewed and is negative other than as noted above in HPI.       HOME MEDICATIONS:  Current Outpatient Prescriptions   Medication Sig Dispense Refill     anastrozole (ARIMIDEX) 1 MG tablet Take 1 tablet (1 mg) by mouth daily 90 tablet 3     traMADol (ULTRAM) 50 MG tablet Take 1 tablet (50 mg) by mouth every 6 hours as needed for pain (maximum 8 tablets per day) 60 tablet 1     amitriptyline (ELAVIL) 10 MG tablet Take 10-20 mg by mouth Reported on 2/28/2017       B Complex-C TABS        vitamin D3 (CHOLECALCIFEROL) 54978 UNITS capsule Take 50,000 Units by mouth       coenzyme Q-10 10 MG CAPS Take 200 mg by mouth       vitamin  s/Minerals TABS         zolpidem (AMBIEN) 10 MG tablet Take 10 mg by mouth       dexamethasone (DECADRON) 4 MG tablet Take 2 tablets (8 mg) by mouth 2 times daily (with meals) Start evening AFTER Docetaxel dose and continue for 4 additional doses. 10 tablet 0     calcium carbonate (TUMS) 500 MG chewable tablet Take 1 chew tab by mouth daily Reported on 2017       omeprazole (PRILOSEC) 40 MG capsule Take 1 capsule (40 mg) by mouth daily Take 30-60 minutes before a meal. 90 capsule 3     LORazepam (ATIVAN) 0.5 MG tablet Take 1 tablet (0.5 mg) by mouth every 4 hours as needed (Anxiety, Nausea/Vomiting or Sleep) 30 tablet 2     DPH-Lido-AlHydr-MgHydr-Simeth (FIRST-MOUTHWASH BLM) SUSP Swish and swallow 5-10 mLs in mouth every 6 hours as needed 237 mL 1     lidocaine-prilocaine (EMLA) cream Apply to port site 1 hour prior to accessing port 30 g 3     ondansetron (ZOFRAN) 8 MG tablet Take 1 tablet (8 mg) by mouth every 8 hours as needed for nausea 30 tablet 3     Ondansetron (ZOFRAN ODT PO) Take 4 mg by mouth Reported on 2017       olopatadine (PATANOL) 0.1 % ophthalmic solution Place 1 drop into both eyes 2 times daily as needed for allergies       ZOLPIDEM TARTRATE PO Take 10 mg by mouth At Bedtime Reported on 2017           ALLERGIES:  Allergies   Allergen Reactions     Percocet [Oxycodone-Acetaminophen] Nausea     Adhesive Tape Blisters     REDNESS,  bandaides     Hydrocodone Nausea and Vomiting     Wound Dressing Adhesive          PAST MEDICAL HISTORY:  Past Medical History   Diagnosis Date     Basal cell carcinoma      Breast CA (H)      Depression with anxiety      Histoplasmosis      Malignant melanoma (H)      PONV (postoperative nausea and vomiting)          PAST SURGICAL HISTORY:  Past Surgical History   Procedure Laterality Date     C rad resec tonsil/pillars       General surgery                           date:   &     C section     Hemorrhoidectomy       Mastectomy        section       times 2      Proctoplasty  4/13/2013     Procedure: PROCTOPLASTY;  PROCTOPLASTY TO REPAIR ANAL FISSURE AND STENOSIS;  Surgeon: Goldberg, Stanley Morton, MD;  Location: [facility]     Breast surgery       bilat mastectomy  2006     Hc revise breast reconstruction       Cl aff surgical pathology       left wrist     Melanoma removal[       right sided neck     Davinci hysterectomy total, bilateral salpingo-oophorectomy, combined  4/26/2013     Procedure: COMBINED DAVINCI HYSTERECTOMY TOTAL, SALPINGO-OOPHORECTOMY;  ROBOTIC ASSISTED TOTAL LAPAROSCOPIC HYSTERECTOMY, BILATERAL SALPINGO OOPHORECTOMY, PARTIAL VULVECTOMY ;  Surgeon: Hira Jenkins MD;  Location:  OR     Vulvectomy simple  4/26/2013     Procedure: VULVECTOMY SIMPLE;;  Surgeon: Hira Jenkins MD;  Location:  OR     Mohs micrographic procedure       Biopsy of skin lesion       Dissect lymph node axilla Left 6/27/2016     Procedure: DISSECT LYMPH NODE AXILLA;  Surgeon: Hebert Driscoll MD;  Location:  OR     Insert port vascular access Right 6/27/2016     Procedure: INSERT PORT VASCULAR ACCESS;  Surgeon: Hebert Driscoll MD;  Location:  OR         SOCIAL HISTORY:  Social History     Social History     Marital status: Single     Spouse name: N/A     Number of children: N/A     Years of education: N/A     Occupational History     Not on file.     Social History Main Topics     Smoking status: Former Smoker     Packs/day: 1.50     Years: 4.00     Types: Cigarettes     Quit date: 1/11/1980     Smokeless tobacco: Never Used     Alcohol use Yes      Comment: occasionally     Drug use: No     Sexual activity: Not on file     Other Topics Concern     Not on file     Social History Narrative         FAMILY HISTORY:  Family History   Problem Relation Age of Onset     CANCER Mother      brain     Other Cancer Mother      Cancer - colorectal Father      Hypertension Father      Colon Cancer Father      CANCER Maternal Aunt      breast     CANCER Other       breast in cousin     Other Cancer Sister      Other Cancer Maternal Grandfather      Breast Cancer Paternal Grandmother      Other Cancer Paternal Grandfather          PHYSICAL EXAM:  Vital signs:  BP (!) 138/95  Pulse 72  Temp 99.1  F (37.3  C) (Oral)  Resp 16  Wt 60.2 kg (132 lb 12.8 oz)  LMP 2006  SpO2 94%  BMI 21.43 kg/m2   ECO  GENERAL/CONSTITUTIONAL: No acute distress.  EYES: No scleral icterus.  LYMPH: No anterior cervical, posterior cervical, supraclavicular, or axillary adenopathy.   RESPIRATORY: Clear to auscultation bilaterally. No crackles or wheezing.   CARDIOVASCULAR: Regular rate and rhythm without murmurs, gallops, or rubs.  GASTROINTESTINAL: No tenderness. The patient has normal bowel sounds. No guarding.  No distention.  BREAST: s/p bilateral mastectomy with implants in place.  No palpable chest wall masses.  MUSCULOSKELETAL: Warm and well-perfused, no cyanosis, clubbing, or edema.  NEUROLOGIC: Alert, oriented, answers questions appropriately.  INTEGUMENTARY: No jaundice.  Numerous scattered moles.    GAIT: Steady, does not use assistive device  Port in place at right upper chest.          LABS:  CBC RESULTS:   Recent Labs   Lab Test  17   0820   WBC  2.8*   RBC  4.06   HGB  13.1   HCT  37.7   MCV  93   MCH  32.3   MCHC  34.7   RDW  12.5   PLT  149*     Recent Labs   Lab Test  17   0820  16   0750   NA  139  140   POTASSIUM  4.2  4.0   CHLORIDE  106  106   CO2  27  25   ANIONGAP  6  9   GLC  80  83   BUN  14  16   CR  0.80  0.90   YANG  8.7  8.6     Lab Results   Component Value Date    AST 16 2017     Lab Results   Component Value Date    ALT 18 2017     No results found for: BILICONJ   Lab Results   Component Value Date    BILITOTAL 0.4 2017     Lab Results   Component Value Date    ALBUMIN 3.6 2017     Lab Results   Component Value Date    PROTTOTAL 6.9 2017      Lab Results   Component Value Date    ALKPHOS 64 2017     Component       Latest Ref Rng & Units 9/22/2016 10/13/2016 11/3/2016 11/25/2016   CA 27-29      0 - 39 U/mL 30 30 27 31       ASSESSMENT/PLAN:  Kita Smith is a 56 year old female:    1) Left breast cancer: diagnosed in 2006, s/p bilateral mastectomy and chemotherapy and hormone therapy.  She is known to be BRCA-2 positive and has undergone hysterectomy and bilateral salpingo-oophorectomy on 04/06/2013 for prophylaxis of ovarian and uterine cancer.  Now with recurrence, s/p left lateral breast lesion excision and left axillary dissection on 6/27/16.  Pathology showed invasive ductal carcinoma, grade 3, tumor size 1.5 cm +LVI, 6 of 31 lymph nodes were involved, ER positive (75%), NH positive (5%), HER-2 negative.  4 of 29 lymph node were positive in the axillary dissection.  There was excision of a second breast nodule that also had metastatic breast cancer.      She has completed chemotherapy and radiation.      -labs today  -will start anastrozole 1 mg po daily today - prescription sent  -we discussed potential side effects, and information handout was given to patient with counseling  -patient wants port removed - referred back to Dr. Driscoll  -[facility] in 1 month for toxicity check    2) Left arm and back pain: started after surgery, and worsened on chemotherapy and radiation  -continue Tramadol 50 mg Q6H prn - renewed today  -continue lymphedema therapy  -physical therapy referral made  -if no improvement over the next couple of months, will consider imaging    3) Lymphedema:  -she is undergoing lymphedema therapy    4) Melanoma: She has history of malignant melanoma x 2, stage IA of the right neck and stage 0 of the left temple.  She now sees another dermatologist, Dr. Mak Bolden, and underwent shave biopsies of right lateral neck, atypical nevi of the left posterior arm and left index finger.  She saw Dr. Sierra at [facility], but will not continue to follow her for surveillance.    -she requests to see  "another dermatologist closer to her home, and with [facility] - referral to Dermatology made, and she has seen.  She is following up with them in a couple of weeks.    5) Pulmonary nodules: were found in 2010 that were thought granulomatous and PET negative at the time, and have been stable on subsequent scans.     6) Reflux:  -she tried omeprazole     7) Bone health:  -schedule DEXA scan  -take calcium and vitamin D      I spent a total of 40 minutes with the patient, with over >50% of the time in counseling and/or coordination of care.      Preeti Jasso MD  Hematology/Oncology  [facility]          Kita mSith is a 56 year old female who presents for:  Chief Complaint   Patient presents with     Oncology Clinic Visit     F/U Breast Ca         Initial Vitals:  LMP 12/01/2006 Estimated body mass index is 21.64 kg/(m^2) as calculated from the following:    Height as of 2/28/17: 1.676 m (5' 6\").    Weight as of 2/28/17: 60.8 kg (134 lb 1.6 oz).. There is no height or weight on file to calculate BSA. BP completed using cuff size: regular  Data Unavailable Patient's last menstrual period was 12/01/2006. Allergies and medications reviewed.     Medications: Medication refills not needed today.  Pharmacy name entered into [employer]:    [id number] - [place], [place] - [address] AT [facility] & [facility] [address]  [facility] [place], [place] - [address]    Comments: None    6 minutes for nursing intake (face to face time)   Jerica Montalvo [facility]            Preeti Jasso MD  "

## 2017-03-07 NOTE — MR AVS SNAPSHOT
After Visit Summary   3/7/2017    Kita Smith    MRN: [id number]           Patient Information     Date Of Birth          1960        Visit Information        Provider Department      3/7/2017 8:30 AM Preeti Jasso MD [facility]        Today's Diagnoses     Malignant neoplasm of overlapping sites of left female breast (H)          Care Instructions    -tramadol prescription printed  -anastrozole prescription sent to your pharmacy  -give information on anastrozole  -schedule bone density scan next week  -take calcium 1200 mg a day, and vitamin D 800 mg a day  -schedule port removal with surgery (Dr. Driscoll), next week  -referral to physical therapy  -return to clinic in 1 month        [address]  [place], [place]        Phone:   [phone]      INSTRUCTIONS:     Dexa Bone Density Screening    Please do not take the following 2 days prior to your exam     Vitamins     Calcium Tablets     Antacids    If you have had an x-ray examination with contrast, you must wait 2 weeks before having a bone density exam.                     Follow-ups after your visit        Additional Services     PHYSICAL THERAPY REFERRAL       *This therapy referral will be filtered to a centralized scheduling office at [facility] and the patient will receive a call to schedule an appointment at a [facility] location most convenient for them. *     [facility] provides Physical Therapy evaluation and treatment and many specialty services across the [facility] system.  If requesting a specialty program, please choose from the list below.    If you have not heard from the scheduling office within 2 business days, please call [phone] for all locations, with the exception of [place], please call [phone].  Treatment: Evaluation & Treatment  Special Instructions/Modalities: left arm and back pain after  surgery  Special Programs: Cancer Rehabilitation (PT and OT Evaluate & Treat)    Please be aware that coverage of these services is subject to the terms and limitations of your health insurance plan.  Call member services at your health plan with any benefit or coverage questions.      **Note to Provider:  If you are referring outside of [place] for the therapy appointment, please list the name of the location in the  special instructions  above, print the referral and give to the patient to schedule the appointment.                  Your next 10 appointments already scheduled     Mar 09, 2017  2:15 PM CST   Lymphedema Treatment with Tamar Elias OT   [facility] Lymphedema OT ([facility])    [address]  [facility] [address]   [phone]            Mar 15, 2017  8:30 AM CDT   DX HIP/PELVIS/SPINE with MADX1   [facility] Dexa ([facility])    [address] [place] [address] [address]  [facility] [address]   [phone]           Please do not take any of the following 48 hours prior to your exam: vitamins, calcium tablets, antacids.            Mar 23, 2017  2:15 PM CDT   Lymphedema Treatment with Tamar Elias OT   [facility] Lymphedema OT ([facility])    [address]  [address]  [facility] [address]   [phone]            Apr 21, 2017  8:00 AM CDT   Return Visit with  Oncology Nurse   [facility] ([facility])    [facility]  [address]  [facility] [address]   [phone]            Apr 21, 2017  8:30 AM CDT   Return Visit with Preeti Jasso MD   [facility] ([facility])    [facility]  [address]  [facility] [address]   [phone]              Future tests that were ordered for you today     Open Future Orders        Priority Expected Expires Ordered    DX Hip/Pelvis/Spine Routine  3/7/2018  "3/7/2017            Who to contact     If you have questions or need follow up information about today's clinic visit or your schedule please contact [facility] directly at [phone].  Normal or non-critical lab and imaging results will be communicated to you by MyChart, letter or phone within 4 business days after the clinic has received the results. If you do not hear from us within 7 days, please contact the clinic through MyChart or phone. If you have a critical or abnormal lab result, we will notify you by phone as soon as possible.  Submit refill requests through [employer] or call your pharmacy and they will forward the refill request to us. Please allow 3 business days for your refill to be completed.          Additional Information About Your Visit        [facility]erred [facility] Information     [employer] lets you send messages to your doctor, view your test results, renew your prescriptions, schedule appointments and more. To sign up, go to [email or web address] . Click on \"Log in\" on the left side of the screen, which will take you to the Welcome page. Then click on \"Sign up Now\" on the right side of the page.     You will be asked to enter the access code listed below, as well as some personal information. Please follow the directions to create your username and password.     Your access code is: [email or web address]  Expires: 2017  3:38 PM     Your access code will  in 90 days. If you need help or a new code, please call your [facility] clinic or [phone].        Care EveryWhere ID     This is your Care EveryWhere ID. This could be used by other organizations to access your [facility] medical records  [id number]        Your Vitals Were     Pulse Temperature Respirations Last Period Pulse Oximetry BMI (Body Mass Index)    72 99.1  F (37.3  C) (Oral) 16 2006 94% 21.43 kg/m2       Blood Pressure from Last 3 Encounters:   17 (!) 138/95   17 (!) 138/95   16 119/81    Weight from Last " 3 Encounters:   03/07/17 60.2 kg (132 lb 12.8 oz)   03/07/17 60.3 kg (133 lb)   02/28/17 60.8 kg (134 lb 1.6 oz)              We Performed the Following     PHYSICAL THERAPY REFERRAL          Today's Medication Changes          These changes are accurate as of: 3/7/17  9:33 AM.  If you have any questions, ask your nurse or doctor.               Start taking these medicines.        Dose/Directions    anastrozole 1 MG tablet   Commonly known as:  ARIMIDEX   Used for:  Malignant neoplasm of overlapping sites of left female breast (H)   Started by:  Preeti Jasso MD        Dose:  1 mg   Take 1 tablet (1 mg) by mouth daily   Quantity:  90 tablet   Refills:  3       traMADol 50 MG tablet   Commonly known as:  ULTRAM   Used for:  Malignant neoplasm of overlapping sites of left female breast (H)   Started by:  Preeti Jasso MD        Dose:  50 mg   Take 1 tablet (50 mg) by mouth every 6 hours as needed for pain (maximum 8 tablets per day)   Quantity:  60 tablet   Refills:  1            Where to get your medicines      These medications were sent to [facility] [id number] [place] [address] AT [facility] & [address] [address]     Phone:  [phone]     anastrozole 1 MG tablet         Some of these will need a paper prescription and others can be bought over the counter.  Ask your nurse if you have questions.     Bring a paper prescription for each of these medications     traMADol 50 MG tablet                Primary Care Provider Office Phone # Fax #    Checo Crockett [phone]       [facility] [place] [address] [place] [address]        Thank you!     Thank you for choosing [facility]  for your care. Our goal is always to provide you with excellent care. Hearing back from our patients is one way we can continue to improve our services. Please take a few minutes to complete the written  survey that you may receive in the mail after your visit with us. Thank you!             Your Updated Medication List - Protect others around you: Learn how to safely use, store and throw away your medicines at www.disposemymeds.org.          This list is accurate as of: 3/7/17  9:33 AM.  Always use your most recent med list.                   Brand Name Dispense Instructions for use    amitriptyline 10 MG tablet    ELAVIL     Take 10-20 mg by mouth Reported on 2/28/2017       anastrozole 1 MG tablet    ARIMIDEX    90 tablet    Take 1 tablet (1 mg) by mouth daily       B Complex-C Tabs          calcium carbonate 500 MG chewable tablet    TUMS     Take 1 chew tab by mouth daily Reported on 2/28/2017       coenzyme Q-10 10 MG Caps      Take 200 mg by mouth       dexamethasone 4 MG tablet    DECADRON    10 tablet    Take 2 tablets (8 mg) by mouth 2 times daily (with meals) Start evening AFTER Docetaxel dose and continue for 4 additional doses.       FIRST-MOUTHWASH BLM Susp     237 mL    Swish and swallow 5-10 mLs in mouth every 6 hours as needed       lidocaine-prilocaine cream    EMLA    30 g    Apply to port site 1 hour prior to accessing port       LORazepam 0.5 MG tablet    ATIVAN    30 tablet    Take 1 tablet (0.5 mg) by mouth every 4 hours as needed (Anxiety, Nausea/Vomiting or Sleep)       olopatadine 0.1 % ophthalmic solution    PATANOL     Place 1 drop into both eyes 2 times daily as needed for allergies       omeprazole 40 MG capsule    priLOSEC    90 capsule    Take 1 capsule (40 mg) by mouth daily Take 30-60 minutes before a meal.       ondansetron 8 MG tablet    ZOFRAN    30 tablet    Take 1 tablet (8 mg) by mouth every 8 hours as needed for nausea       traMADol 50 MG tablet    ULTRAM    60 tablet    Take 1 tablet (50 mg) by mouth every 6 hours as needed for pain (maximum 8 tablets per day)       vitamin  s/Minerals Tabs          vitamin D3 56458 UNITS capsule    CHOLECALCIFEROL     Take 50,000 Units by  mouth       ZOFRAN ODT PO      Take 4 mg by mouth Reported on 2/28/2017       * ZOLPIDEM TARTRATE PO      Take 10 mg by mouth At Bedtime Reported on 2/28/2017       * zolpidem 10 MG tablet    AMBIEN     Take 10 mg by mouth       * Notice:  This list has 2 medication(s) that are the same as other medications prescribed for you. Read the directions carefully, and ask your doctor or other care provider to review them with you.

## 2017-03-07 NOTE — PATIENT INSTRUCTIONS
-tramadol prescription printed  -anastrozole prescription sent to your pharmacy  -give information on anastrozole  -schedule bone density scan next week  -take calcium 1200 mg a day, and vitamin D 800 mg a day  -schedule port removal with surgery (Dr. Driscoll), next week  -referral to physical therapy  -return to clinic in 1 month        [facility]   [address]  [place], [place]        Phone:   [phone]      INSTRUCTIONS:     Dexa Bone Density Screening    Please do not take the following 2 days prior to your exam     Vitamins     Calcium Tablets     Antacids    If you have had an x-ray examination with contrast, you must wait 2 weeks before having a bone density exam.

## 2017-03-07 NOTE — PROGRESS NOTES
[facility]    Hematology/Oncology Established Patient Follow-up Note      Today's Date: 03/07/2017    Reason for Follow-up: history of breast cancer and melanoma    HISTORY OF PRESENT ILLNESS: Kita Smith is a 56 year old female who is being followed for adenocarcinoma of the breast as well as melanoma. She was previously followed by Dr. Minor.  Kita is known to be BRCA-2 positive. She has undergone bilateral mastectomy.  She has also undergone hysterectomy and bilateral salpingo-oophorectomy on 04/06/2013 for prophylaxis of ovarian and uterine cancer.     She first presented with multifocal lesions in the left breast in 2006. She underwent left mastectomy on 01/28/2006 demonstrating multifocal infiltrating ductal adenocarcinoma, grade 2, with DCIS. She had 3 separate lesions measuring 1.5 cm, 0.5 cm, and 0.4 cm. All lymph nodes were negative. The tumor was ER/SD positive and HER-2 negative. She received 4 cycles of dose-dense Adriamycin and Cytoxan and was then on tamoxifen through 2012 when it was discontinued.     In the summer of 2012, Kita noted a hyperpigmented lesion over the right neck which was rapidly growing and changing over 1-2 months. The lesion was excised by Dr. Nataliia Baron. The lesion was a Santiago level III melanoma Breslow depth 0.5 mm.   Stage IA.  A wide excision was subsequently performed by Dr. Nataliia Baron, with 1 cm margins and no evidence of residual disease. The patient was subsequently referred to Dr. Childers. A PET-CT showed no evidence of metastatic disease. 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 Bazan of [facility] in March 2016.  This was stage 0.    On a CT scan study done on 10/12/2010 she was noted to have multiple bilateral pulmonary nodules, which were felt to be granulomatous. These have been followed; these were PET negative. Her most recent CT of the chest  done 04/05/2013 showed that the tiny pulmonary nodules were stable over 3 years and therefore these are no longer followed on a routine basis.      PET-CT in June 2016 showed left axillary lymphadenopathy and biopsy of lymph node showed metastatic breast carcinoma, consistent with recurrence.  On 6/27/16, she underwent left lateral breast lesion excision and left axillary dissection.  Pathology showed invasive ductal carcinoma, grade 3, tumor size 1.5 cm +LVI, 6 of 31 lymph nodes were involved, ER positive (75%), OR positive (5%), HER-2 negative.  4 of 29 lymph node were positive in the axillary dissection.  There was excision of a second breast nodule that also had metastatic breast cancer.      She began chemotherapy on 8/11/16, and completed docetaxel+carboplatin x 6 cycles on 11/25/16.  She completed radiation in late February 2017.        INTERIM HISTORY:  Kita comes in for follow-up today.  She has finished radiation.  She says that it got harder towards the end with fatigue.  She wants her port removed.  She still has pain at her left arm and back ever since her surgery, and continues to take tramadol, which helps.        REVIEW OF SYSTEMS:   14 point ROS was reviewed and is negative other than as noted above in HPI.       HOME MEDICATIONS:  Current Outpatient Prescriptions   Medication Sig Dispense Refill     anastrozole (ARIMIDEX) 1 MG tablet Take 1 tablet (1 mg) by mouth daily 90 tablet 3     traMADol (ULTRAM) 50 MG tablet Take 1 tablet (50 mg) by mouth every 6 hours as needed for pain (maximum 8 tablets per day) 60 tablet 1     amitriptyline (ELAVIL) 10 MG tablet Take 10-20 mg by mouth Reported on 2/28/2017       B Complex-C TABS        vitamin D3 (CHOLECALCIFEROL) 85271 UNITS capsule Take 50,000 Units by mouth       coenzyme Q-10 10 MG CAPS Take 200 mg by mouth       vitamin  s/Minerals TABS        zolpidem (AMBIEN) 10 MG tablet Take 10 mg by mouth       dexamethasone (DECADRON) 4 MG tablet Take 2  tablets (8 mg) by mouth 2 times daily (with meals) Start evening AFTER Docetaxel dose and continue for 4 additional doses. 10 tablet 0     calcium carbonate (TUMS) 500 MG chewable tablet Take 1 chew tab by mouth daily Reported on 2017       omeprazole (PRILOSEC) 40 MG capsule Take 1 capsule (40 mg) by mouth daily Take 30-60 minutes before a meal. 90 capsule 3     LORazepam (ATIVAN) 0.5 MG tablet Take 1 tablet (0.5 mg) by mouth every 4 hours as needed (Anxiety, Nausea/Vomiting or Sleep) 30 tablet 2     DPH-Lido-AlHydr-MgHydr-Simeth (FIRST-MOUTHWASH BLM) SUSP Swish and swallow 5-10 mLs in mouth every 6 hours as needed 237 mL 1     lidocaine-prilocaine (EMLA) cream Apply to port site 1 hour prior to accessing port 30 g 3     ondansetron (ZOFRAN) 8 MG tablet Take 1 tablet (8 mg) by mouth every 8 hours as needed for nausea 30 tablet 3     Ondansetron (ZOFRAN ODT PO) Take 4 mg by mouth Reported on 2017       olopatadine (PATANOL) 0.1 % ophthalmic solution Place 1 drop into both eyes 2 times daily as needed for allergies       ZOLPIDEM TARTRATE PO Take 10 mg by mouth At Bedtime Reported on 2017           ALLERGIES:  Allergies   Allergen Reactions     Percocet [Oxycodone-Acetaminophen] Nausea     Adhesive Tape Blisters     REDNESS,  bandaides     Hydrocodone Nausea and Vomiting     Wound Dressing Adhesive          PAST MEDICAL HISTORY:  Past Medical History   Diagnosis Date     Basal cell carcinoma      Breast CA (H)      Depression with anxiety      Histoplasmosis      Malignant melanoma (H)      PONV (postoperative nausea and vomiting)          PAST SURGICAL HISTORY:  Past Surgical History   Procedure Laterality Date     C rad resec tonsil/pillars       General surgery                           date:   &     C section     Hemorrhoidectomy       Mastectomy        section       times 2     Proctoplasty  2013     Procedure: PROCTOPLASTY;  PROCTOPLASTY TO REPAIR ANAL FISSURE AND  STENOSIS;  Surgeon: Goldberg, Stanley Morton, MD;  Location: [facility]     Breast surgery       bilat mastectomy  2006     Hc revise breast reconstruction       Cl aff surgical pathology       left wrist     Melanoma removal[       right sided neck     Davinci hysterectomy total, bilateral salpingo-oophorectomy, combined  4/26/2013     Procedure: COMBINED DAVINCI HYSTERECTOMY TOTAL, SALPINGO-OOPHORECTOMY;  ROBOTIC ASSISTED TOTAL LAPAROSCOPIC HYSTERECTOMY, BILATERAL SALPINGO OOPHORECTOMY, PARTIAL VULVECTOMY ;  Surgeon: Hira Jenkins MD;  Location:  OR     Vulvectomy simple  4/26/2013     Procedure: VULVECTOMY SIMPLE;;  Surgeon: Hira Jenkins MD;  Location:  OR     Mohs micrographic procedure       Biopsy of skin lesion       Dissect lymph node axilla Left 6/27/2016     Procedure: DISSECT LYMPH NODE AXILLA;  Surgeon: Hebert Driscoll MD;  Location:  OR     Insert port vascular access Right 6/27/2016     Procedure: INSERT PORT VASCULAR ACCESS;  Surgeon: Hebert Driscoll MD;  Location:  OR         SOCIAL HISTORY:  Social History     Social History     Marital status: Single     Spouse name: N/A     Number of children: N/A     Years of education: N/A     Occupational History     Not on file.     Social History Main Topics     Smoking status: Former Smoker     Packs/day: 1.50     Years: 4.00     Types: Cigarettes     Quit date: 1/11/1980     Smokeless tobacco: Never Used     Alcohol use Yes      Comment: occasionally     Drug use: No     Sexual activity: Not on file     Other Topics Concern     Not on file     Social History Narrative         FAMILY HISTORY:  Family History   Problem Relation Age of Onset     CANCER Mother      brain     Other Cancer Mother      Cancer - colorectal Father      Hypertension Father      Colon Cancer Father      CANCER Maternal Aunt      breast     CANCER Other      breast in cousin     Other Cancer Sister      Other Cancer Maternal Grandfather      Breast Cancer  Paternal Grandmother      Other Cancer Paternal Grandfather          PHYSICAL EXAM:  Vital signs:  BP (!) 138/95  Pulse 72  Temp 99.1  F (37.3  C) (Oral)  Resp 16  Wt 60.2 kg (132 lb 12.8 oz)  LMP 2006  SpO2 94%  BMI 21.43 kg/m2   ECO  GENERAL/CONSTITUTIONAL: No acute distress.  EYES: No scleral icterus.  LYMPH: No anterior cervical, posterior cervical, supraclavicular, or axillary adenopathy.   RESPIRATORY: Clear to auscultation bilaterally. No crackles or wheezing.   CARDIOVASCULAR: Regular rate and rhythm without murmurs, gallops, or rubs.  GASTROINTESTINAL: No tenderness. The patient has normal bowel sounds. No guarding.  No distention.  BREAST: s/p bilateral mastectomy with implants in place.  No palpable chest wall masses.  MUSCULOSKELETAL: Warm and well-perfused, no cyanosis, clubbing, or edema.  NEUROLOGIC: Alert, oriented, answers questions appropriately.  INTEGUMENTARY: No jaundice.  Numerous scattered moles.    GAIT: Steady, does not use assistive device  Port in place at right upper chest.          LABS:  CBC RESULTS:   Recent Labs   Lab Test  17   0820   WBC  2.8*   RBC  4.06   HGB  13.1   HCT  37.7   MCV  93   MCH  32.3   MCHC  34.7   RDW  12.5   PLT  149*     Recent Labs   Lab Test  17   0820  16   0750   NA  139  140   POTASSIUM  4.2  4.0   CHLORIDE  106  106   CO2  27  25   ANIONGAP  6  9   GLC  80  83   BUN  14  16   CR  0.80  0.90   YANG  8.7  8.6     Lab Results   Component Value Date    AST 16 2017     Lab Results   Component Value Date    ALT 18 2017     No results found for: BILICONJ   Lab Results   Component Value Date    BILITOTAL 0.4 2017     Lab Results   Component Value Date    ALBUMIN 3.6 2017     Lab Results   Component Value Date    PROTTOTAL 6.9 2017      Lab Results   Component Value Date    ALKPHOS 64 2017     Component      Latest Ref Rng & Units 2016 10/13/2016 11/3/2016 2016   CA 27-29      0 - 39 U/mL 30  30 27 31       ASSESSMENT/PLAN:  Kita Smith is a 56 year old female:    1) Left breast cancer: diagnosed in 2006, s/p bilateral mastectomy and chemotherapy and hormone therapy.  She is known to be BRCA-2 positive and has undergone hysterectomy and bilateral salpingo-oophorectomy on 04/06/2013 for prophylaxis of ovarian and uterine cancer.  Now with recurrence, s/p left lateral breast lesion excision and left axillary dissection on 6/27/16.  Pathology showed invasive ductal carcinoma, grade 3, tumor size 1.5 cm +LVI, 6 of 31 lymph nodes were involved, ER positive (75%), MD positive (5%), HER-2 negative.  4 of 29 lymph node were positive in the axillary dissection.  There was excision of a second breast nodule that also had metastatic breast cancer.      She has completed chemotherapy and radiation.      -labs today  -will start anastrozole 1 mg po daily today - prescription sent  -we discussed potential side effects, and information handout was given to patient with counseling  -patient wants port removed - referred back to Dr. Driscoll  -[facility] in 1 month for toxicity check    2) Left arm and back pain: started after surgery, and worsened on chemotherapy and radiation  -continue Tramadol 50 mg Q6H prn - renewed today  -continue lymphedema therapy  -physical therapy referral made  -if no improvement over the next couple of months, will consider imaging    3) Lymphedema:  -she is undergoing lymphedema therapy    4) Melanoma: She has history of malignant melanoma x 2, stage IA of the right neck and stage 0 of the left temple.  She now sees another dermatologist, Dr. Mak Bolden, and underwent shave biopsies of right lateral neck, atypical nevi of the left posterior arm and left index finger.  She saw Dr. Sierra at [facility], but will not continue to follow her for surveillance.    -she requests to see another dermatologist closer to her home, and with [facility] - referral to Dermatology made, and she  has seen.  She is following up with them in a couple of weeks.    5) Pulmonary nodules: were found in 2010 that were thought granulomatous and PET negative at the time, and have been stable on subsequent scans.     6) Reflux:  -she tried omeprazole     7) Bone health:  -schedule DEXA scan  -take calcium and vitamin D      I spent a total of 40 minutes with the patient, with over >50% of the time in counseling and/or coordination of care.      Preeti Jasso MD  Hematology/Oncology  [facility]

## 2017-03-09 ENCOUNTER — HOSPITAL ENCOUNTER (OUTPATIENT)
Dept: OCCUPATIONAL THERAPY | Facility: CLINIC | Age: 57
Setting detail: THERAPIES SERIES
End: 2017-03-09
Attending: FAMILY MEDICINE
Payer: COMMERCIAL

## 2017-03-09 PROCEDURE — 97140 MANUAL THERAPY 1/> REGIONS: CPT | Mod: GO

## 2017-03-09 PROCEDURE — 40000445 ZZHC STATISTIC OT VISIT, LYMPHEDEMA

## 2017-03-14 NOTE — H&P (VIEW-ONLY)
[facility]    Hematology/Oncology Established Patient Follow-up Note      Today's Date: 03/07/2017    Reason for Follow-up: history of breast cancer and melanoma    HISTORY OF PRESENT ILLNESS: Kita Smith is a 56 year old female who is being followed for adenocarcinoma of the breast as well as melanoma. She was previously followed by Dr. Minor.  Kita is known to be BRCA-2 positive. She has undergone bilateral mastectomy.  She has also undergone hysterectomy and bilateral salpingo-oophorectomy on 04/06/2013 for prophylaxis of ovarian and uterine cancer.     She first presented with multifocal lesions in the left breast in 2006. She underwent left mastectomy on 01/28/2006 demonstrating multifocal infiltrating ductal adenocarcinoma, grade 2, with DCIS. She had 3 separate lesions measuring 1.5 cm, 0.5 cm, and 0.4 cm. All lymph nodes were negative. The tumor was ER/DC positive and HER-2 negative. She received 4 cycles of dose-dense Adriamycin and Cytoxan and was then on tamoxifen through 2012 when it was discontinued.     In the summer of 2012, Kita noted a hyperpigmented lesion over the right neck which was rapidly growing and changing over 1-2 months. The lesion was excised by Dr. Nataliia Baron. The lesion was a Santiago level III melanoma Breslow depth 0.5 mm.   Stage IA.  A wide excision was subsequently performed by Dr. Nataliia Baron, with 1 cm margins and no evidence of residual disease. The patient was subsequently referred to Dr. Childers. A PET-CT showed no evidence of metastatic disease. 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 Bazan of [facility] in March 2016.  This was stage 0.    On a CT scan study done on 10/12/2010 she was noted to have multiple bilateral pulmonary nodules, which were felt to be granulomatous. These have been followed; these were PET negative. Her most recent CT of the chest  done 04/05/2013 showed that the tiny pulmonary nodules were stable over 3 years and therefore these are no longer followed on a routine basis.      PET-CT in June 2016 showed left axillary lymphadenopathy and biopsy of lymph node showed metastatic breast carcinoma, consistent with recurrence.  On 6/27/16, she underwent left lateral breast lesion excision and left axillary dissection.  Pathology showed invasive ductal carcinoma, grade 3, tumor size 1.5 cm +LVI, 6 of 31 lymph nodes were involved, ER positive (75%), UT positive (5%), HER-2 negative.  4 of 29 lymph node were positive in the axillary dissection.  There was excision of a second breast nodule that also had metastatic breast cancer.      She began chemotherapy on 8/11/16, and completed docetaxel+carboplatin x 6 cycles on 11/25/16.  She completed radiation in late February 2017.        INTERIM HISTORY:  Kita comes in for follow-up today.  She has finished radiation.  She says that it got harder towards the end with fatigue.  She wants her port removed.  She still has pain at her left arm and back ever since her surgery, and continues to take tramadol, which helps.        REVIEW OF SYSTEMS:   14 point ROS was reviewed and is negative other than as noted above in HPI.       HOME MEDICATIONS:  Current Outpatient Prescriptions   Medication Sig Dispense Refill     anastrozole (ARIMIDEX) 1 MG tablet Take 1 tablet (1 mg) by mouth daily 90 tablet 3     traMADol (ULTRAM) 50 MG tablet Take 1 tablet (50 mg) by mouth every 6 hours as needed for pain (maximum 8 tablets per day) 60 tablet 1     amitriptyline (ELAVIL) 10 MG tablet Take 10-20 mg by mouth Reported on 2/28/2017       B Complex-C TABS        vitamin D3 (CHOLECALCIFEROL) 72760 UNITS capsule Take 50,000 Units by mouth       coenzyme Q-10 10 MG CAPS Take 200 mg by mouth       vitamin  s/Minerals TABS        zolpidem (AMBIEN) 10 MG tablet Take 10 mg by mouth       dexamethasone (DECADRON) 4 MG tablet Take 2  tablets (8 mg) by mouth 2 times daily (with meals) Start evening AFTER Docetaxel dose and continue for 4 additional doses. 10 tablet 0     calcium carbonate (TUMS) 500 MG chewable tablet Take 1 chew tab by mouth daily Reported on 2017       omeprazole (PRILOSEC) 40 MG capsule Take 1 capsule (40 mg) by mouth daily Take 30-60 minutes before a meal. 90 capsule 3     LORazepam (ATIVAN) 0.5 MG tablet Take 1 tablet (0.5 mg) by mouth every 4 hours as needed (Anxiety, Nausea/Vomiting or Sleep) 30 tablet 2     DPH-Lido-AlHydr-MgHydr-Simeth (FIRST-MOUTHWASH BLM) SUSP Swish and swallow 5-10 mLs in mouth every 6 hours as needed 237 mL 1     lidocaine-prilocaine (EMLA) cream Apply to port site 1 hour prior to accessing port 30 g 3     ondansetron (ZOFRAN) 8 MG tablet Take 1 tablet (8 mg) by mouth every 8 hours as needed for nausea 30 tablet 3     Ondansetron (ZOFRAN ODT PO) Take 4 mg by mouth Reported on 2017       olopatadine (PATANOL) 0.1 % ophthalmic solution Place 1 drop into both eyes 2 times daily as needed for allergies       ZOLPIDEM TARTRATE PO Take 10 mg by mouth At Bedtime Reported on 2017           ALLERGIES:  Allergies   Allergen Reactions     Percocet [Oxycodone-Acetaminophen] Nausea     Adhesive Tape Blisters     REDNESS,  bandaides     Hydrocodone Nausea and Vomiting     Wound Dressing Adhesive          PAST MEDICAL HISTORY:  Past Medical History   Diagnosis Date     Basal cell carcinoma      Breast CA (H)      Depression with anxiety      Histoplasmosis      Malignant melanoma (H)      PONV (postoperative nausea and vomiting)          PAST SURGICAL HISTORY:  Past Surgical History   Procedure Laterality Date     C rad resec tonsil/pillars       General surgery                           date:   &     C section     Hemorrhoidectomy       Mastectomy        section       times 2     Proctoplasty  2013     Procedure: PROCTOPLASTY;  PROCTOPLASTY TO REPAIR ANAL FISSURE AND  STENOSIS;  Surgeon: Goldberg, Stanley Morton, MD;  Location: [facility]     Breast surgery       bilat mastectomy  2006     Hc revise breast reconstruction       Cl aff surgical pathology       left wrist     Melanoma removal[       right sided neck     Davinci hysterectomy total, bilateral salpingo-oophorectomy, combined  4/26/2013     Procedure: COMBINED DAVINCI HYSTERECTOMY TOTAL, SALPINGO-OOPHORECTOMY;  ROBOTIC ASSISTED TOTAL LAPAROSCOPIC HYSTERECTOMY, BILATERAL SALPINGO OOPHORECTOMY, PARTIAL VULVECTOMY ;  Surgeon: Hira Jenkins MD;  Location:  OR     Vulvectomy simple  4/26/2013     Procedure: VULVECTOMY SIMPLE;;  Surgeon: Hira Jenkins MD;  Location:  OR     Mohs micrographic procedure       Biopsy of skin lesion       Dissect lymph node axilla Left 6/27/2016     Procedure: DISSECT LYMPH NODE AXILLA;  Surgeon: Hebert Driscoll MD;  Location:  OR     Insert port vascular access Right 6/27/2016     Procedure: INSERT PORT VASCULAR ACCESS;  Surgeon: Hebert Driscoll MD;  Location:  OR         SOCIAL HISTORY:  Social History     Social History     Marital status: Single     Spouse name: N/A     Number of children: N/A     Years of education: N/A     Occupational History     Not on file.     Social History Main Topics     Smoking status: Former Smoker     Packs/day: 1.50     Years: 4.00     Types: Cigarettes     Quit date: 1/11/1980     Smokeless tobacco: Never Used     Alcohol use Yes      Comment: occasionally     Drug use: No     Sexual activity: Not on file     Other Topics Concern     Not on file     Social History Narrative         FAMILY HISTORY:  Family History   Problem Relation Age of Onset     CANCER Mother      brain     Other Cancer Mother      Cancer - colorectal Father      Hypertension Father      Colon Cancer Father      CANCER Maternal Aunt      breast     CANCER Other      breast in cousin     Other Cancer Sister      Other Cancer Maternal Grandfather      Breast Cancer  Paternal Grandmother      Other Cancer Paternal Grandfather          PHYSICAL EXAM:  Vital signs:  BP (!) 138/95  Pulse 72  Temp 99.1  F (37.3  C) (Oral)  Resp 16  Wt 60.2 kg (132 lb 12.8 oz)  LMP 2006  SpO2 94%  BMI 21.43 kg/m2   ECO  GENERAL/CONSTITUTIONAL: No acute distress.  EYES: No scleral icterus.  LYMPH: No anterior cervical, posterior cervical, supraclavicular, or axillary adenopathy.   RESPIRATORY: Clear to auscultation bilaterally. No crackles or wheezing.   CARDIOVASCULAR: Regular rate and rhythm without murmurs, gallops, or rubs.  GASTROINTESTINAL: No tenderness. The patient has normal bowel sounds. No guarding.  No distention.  BREAST: s/p bilateral mastectomy with implants in place.  No palpable chest wall masses.  MUSCULOSKELETAL: Warm and well-perfused, no cyanosis, clubbing, or edema.  NEUROLOGIC: Alert, oriented, answers questions appropriately.  INTEGUMENTARY: No jaundice.  Numerous scattered moles.    GAIT: Steady, does not use assistive device  Port in place at right upper chest.          LABS:  CBC RESULTS:   Recent Labs   Lab Test  17   0820   WBC  2.8*   RBC  4.06   HGB  13.1   HCT  37.7   MCV  93   MCH  32.3   MCHC  34.7   RDW  12.5   PLT  149*     Recent Labs   Lab Test  17   0820  16   0750   NA  139  140   POTASSIUM  4.2  4.0   CHLORIDE  106  106   CO2  27  25   ANIONGAP  6  9   GLC  80  83   BUN  14  16   CR  0.80  0.90   YANG  8.7  8.6     Lab Results   Component Value Date    AST 16 2017     Lab Results   Component Value Date    ALT 18 2017     No results found for: BILICONJ   Lab Results   Component Value Date    BILITOTAL 0.4 2017     Lab Results   Component Value Date    ALBUMIN 3.6 2017     Lab Results   Component Value Date    PROTTOTAL 6.9 2017      Lab Results   Component Value Date    ALKPHOS 64 2017     Component      Latest Ref Rng & Units 2016 10/13/2016 11/3/2016 2016   CA 27-29      0 - 39 U/mL 30  30 27 31       ASSESSMENT/PLAN:  Kita Smith is a 56 year old female:    1) Left breast cancer: diagnosed in 2006, s/p bilateral mastectomy and chemotherapy and hormone therapy.  She is known to be BRCA-2 positive and has undergone hysterectomy and bilateral salpingo-oophorectomy on 04/06/2013 for prophylaxis of ovarian and uterine cancer.  Now with recurrence, s/p left lateral breast lesion excision and left axillary dissection on 6/27/16.  Pathology showed invasive ductal carcinoma, grade 3, tumor size 1.5 cm +LVI, 6 of 31 lymph nodes were involved, ER positive (75%), OK positive (5%), HER-2 negative.  4 of 29 lymph node were positive in the axillary dissection.  There was excision of a second breast nodule that also had metastatic breast cancer.      She has completed chemotherapy and radiation.      -labs today  -will start anastrozole 1 mg po daily today - prescription sent  -we discussed potential side effects, and information handout was given to patient with counseling  -patient wants port removed - referred back to Dr. Driscoll  -[facility] in 1 month for toxicity check    2) Left arm and back pain: started after surgery, and worsened on chemotherapy and radiation  -continue Tramadol 50 mg Q6H prn - renewed today  -continue lymphedema therapy  -physical therapy referral made  -if no improvement over the next couple of months, will consider imaging    3) Lymphedema:  -she is undergoing lymphedema therapy    4) Melanoma: She has history of malignant melanoma x 2, stage IA of the right neck and stage 0 of the left temple.  She now sees another dermatologist, Dr. Mak Bolden, and underwent shave biopsies of right lateral neck, atypical nevi of the left posterior arm and left index finger.  She saw Dr. Sierra at [facility], but will not continue to follow her for surveillance.    -she requests to see another dermatologist closer to her home, and with [facility] - referral to Dermatology made, and she  has seen.  She is following up with them in a couple of weeks.    5) Pulmonary nodules: were found in 2010 that were thought granulomatous and PET negative at the time, and have been stable on subsequent scans.     6) Reflux:  -she tried omeprazole     7) Bone health:  -schedule DEXA scan  -take calcium and vitamin D      I spent a total of 40 minutes with the patient, with over >50% of the time in counseling and/or coordination of care.      Preeti Jasso MD  Hematology/Oncology  [facility]

## 2017-03-15 ENCOUNTER — HOSPITAL ENCOUNTER (OUTPATIENT)
Dept: BONE DENSITY | Facility: CLINIC | Age: 57
Discharge: HOME | End: 2017-03-15
Attending: INTERNAL MEDICINE | Admitting: INTERNAL MEDICINE
Payer: COMMERCIAL

## 2017-03-15 DIAGNOSIS — C50.812 MALIGNANT NEOPLASM OF OVERLAPPING SITES OF LEFT FEMALE BREAST (H): ICD-10-CM

## 2017-03-15 PROCEDURE — 77080 DXA BONE DENSITY AXIAL: CPT

## 2017-03-16 ENCOUNTER — APPOINTMENT (OUTPATIENT)
Dept: SURGERY | Facility: PHYSICIAN GROUP | Age: 57
End: 2017-03-16
Payer: COMMERCIAL

## 2017-03-16 ENCOUNTER — ANESTHESIA EVENT (OUTPATIENT)
Dept: SURGERY | Facility: CLINIC | Age: 57
End: 2017-03-16
Payer: COMMERCIAL

## 2017-03-16 ENCOUNTER — ANESTHESIA (OUTPATIENT)
Dept: SURGERY | Facility: CLINIC | Age: 57
End: 2017-03-16
Payer: COMMERCIAL

## 2017-03-16 ENCOUNTER — SURGERY (OUTPATIENT)
Age: 57
End: 2017-03-16

## 2017-03-16 ENCOUNTER — HOSPITAL ENCOUNTER (OUTPATIENT)
Facility: CLINIC | Age: 57
Discharge: HOME | End: 2017-03-16
Attending: SURGERY | Admitting: SURGERY
Payer: COMMERCIAL

## 2017-03-16 VITALS
RESPIRATION RATE: 18 BRPM | HEIGHT: 66 IN | WEIGHT: 130 LBS | SYSTOLIC BLOOD PRESSURE: 118 MMHG | DIASTOLIC BLOOD PRESSURE: 87 MMHG | BODY MASS INDEX: 20.89 KG/M2 | OXYGEN SATURATION: 98 % | TEMPERATURE: 97.6 F

## 2017-03-16 PROCEDURE — 25000125 ZZHC RX 250: Performed by: SURGERY

## 2017-03-16 PROCEDURE — 36000050 ZZH SURGERY LEVEL 2 1ST 30 MIN: Performed by: SURGERY

## 2017-03-16 PROCEDURE — 27210995 ZZH RX 272: Performed by: SURGERY

## 2017-03-16 PROCEDURE — 36590 REMOVAL TUNNELED CV CATH: CPT | Performed by: SURGERY

## 2017-03-16 PROCEDURE — 25000132 ZZH RX MED GY IP 250 OP 250 PS 637: Performed by: PHYSICIAN ASSISTANT

## 2017-03-16 PROCEDURE — 27210794 ZZH OR GENERAL SUPPLY STERILE: Performed by: SURGERY

## 2017-03-16 PROCEDURE — 25000128 H RX IP 250 OP 636: Performed by: NURSE ANESTHETIST, CERTIFIED REGISTERED

## 2017-03-16 PROCEDURE — 37000008 ZZH ANESTHESIA TECHNICAL FEE, 1ST 30 MIN: Performed by: SURGERY

## 2017-03-16 PROCEDURE — 40000170 ZZH STATISTIC PRE-PROCEDURE ASSESSMENT II: Performed by: SURGERY

## 2017-03-16 PROCEDURE — 71000012 ZZH RECOVERY PHASE 1 LEVEL 1 FIRST HR: Performed by: SURGERY

## 2017-03-16 PROCEDURE — 25800025 ZZH RX 258: Performed by: NURSE ANESTHETIST, CERTIFIED REGISTERED

## 2017-03-16 PROCEDURE — 25000128 H RX IP 250 OP 636: Performed by: SURGERY

## 2017-03-16 PROCEDURE — 37000009 ZZH ANESTHESIA TECHNICAL FEE, EACH ADDTL 15 MIN: Performed by: SURGERY

## 2017-03-16 PROCEDURE — 25000125 ZZHC RX 250: Performed by: ANESTHESIOLOGY

## 2017-03-16 PROCEDURE — 71000027 ZZH RECOVERY PHASE 2 EACH 15 MINS: Performed by: SURGERY

## 2017-03-16 PROCEDURE — 25000125 ZZHC RX 250: Performed by: NURSE ANESTHETIST, CERTIFIED REGISTERED

## 2017-03-16 RX ORDER — METOCLOPRAMIDE HYDROCHLORIDE 5 MG/ML
10 INJECTION INTRAMUSCULAR; INTRAVENOUS EVERY 6 HOURS PRN
Status: DISCONTINUED | OUTPATIENT
Start: 2017-03-16 | End: 2017-03-16 | Stop reason: HOSPADM

## 2017-03-16 RX ORDER — ONDANSETRON 4 MG/1
4 TABLET, ORALLY DISINTEGRATING ORAL EVERY 30 MIN PRN
Status: DISCONTINUED | OUTPATIENT
Start: 2017-03-16 | End: 2017-03-16 | Stop reason: HOSPADM

## 2017-03-16 RX ORDER — ONDANSETRON 2 MG/ML
INJECTION INTRAMUSCULAR; INTRAVENOUS PRN
Status: DISCONTINUED | OUTPATIENT
Start: 2017-03-16 | End: 2017-03-16

## 2017-03-16 RX ORDER — CEFAZOLIN SODIUM 1 G/3ML
1 INJECTION, POWDER, FOR SOLUTION INTRAMUSCULAR; INTRAVENOUS SEE ADMIN INSTRUCTIONS
Status: DISCONTINUED | OUTPATIENT
Start: 2017-03-16 | End: 2017-03-16 | Stop reason: HOSPADM

## 2017-03-16 RX ORDER — MEPERIDINE HYDROCHLORIDE 25 MG/ML
12.5 INJECTION INTRAMUSCULAR; INTRAVENOUS; SUBCUTANEOUS
Status: DISCONTINUED | OUTPATIENT
Start: 2017-03-16 | End: 2017-03-16 | Stop reason: HOSPADM

## 2017-03-16 RX ORDER — FENTANYL CITRATE 50 UG/ML
25-50 INJECTION, SOLUTION INTRAMUSCULAR; INTRAVENOUS
Status: DISCONTINUED | OUTPATIENT
Start: 2017-03-16 | End: 2017-03-16 | Stop reason: HOSPADM

## 2017-03-16 RX ORDER — BUPIVACAINE HYDROCHLORIDE 5 MG/ML
INJECTION, SOLUTION EPIDURAL; INTRACAUDAL
Status: DISCONTINUED
Start: 2017-03-16 | End: 2017-03-16 | Stop reason: HOSPADM

## 2017-03-16 RX ORDER — SCOLOPAMINE TRANSDERMAL SYSTEM 1 MG/1
1 PATCH, EXTENDED RELEASE TRANSDERMAL ONCE
Status: COMPLETED | OUTPATIENT
Start: 2017-03-16 | End: 2017-03-16

## 2017-03-16 RX ORDER — TRAMADOL HYDROCHLORIDE 50 MG/1
50 TABLET ORAL ONCE
Status: COMPLETED | OUTPATIENT
Start: 2017-03-16 | End: 2017-03-16

## 2017-03-16 RX ORDER — CEFAZOLIN SODIUM 2 G/100ML
2 INJECTION, SOLUTION INTRAVENOUS
Status: COMPLETED | OUTPATIENT
Start: 2017-03-16 | End: 2017-03-16

## 2017-03-16 RX ORDER — FENTANYL CITRATE 50 UG/ML
25-50 INJECTION, SOLUTION INTRAMUSCULAR; INTRAVENOUS EVERY 5 MIN PRN
Status: DISCONTINUED | OUTPATIENT
Start: 2017-03-16 | End: 2017-03-16 | Stop reason: HOSPADM

## 2017-03-16 RX ORDER — METOCLOPRAMIDE 10 MG/1
10 TABLET ORAL EVERY 6 HOURS PRN
Status: DISCONTINUED | OUTPATIENT
Start: 2017-03-16 | End: 2017-03-16 | Stop reason: HOSPADM

## 2017-03-16 RX ORDER — SODIUM CHLORIDE, SODIUM LACTATE, POTASSIUM CHLORIDE, CALCIUM CHLORIDE 600; 310; 30; 20 MG/100ML; MG/100ML; MG/100ML; MG/100ML
INJECTION, SOLUTION INTRAVENOUS CONTINUOUS
Status: DISCONTINUED | OUTPATIENT
Start: 2017-03-16 | End: 2017-03-16 | Stop reason: HOSPADM

## 2017-03-16 RX ORDER — LIDOCAINE HYDROCHLORIDE AND EPINEPHRINE 10; 10 MG/ML; UG/ML
INJECTION, SOLUTION INFILTRATION; PERINEURAL
Status: DISCONTINUED
Start: 2017-03-16 | End: 2017-03-16 | Stop reason: HOSPADM

## 2017-03-16 RX ORDER — NALOXONE HYDROCHLORIDE 0.4 MG/ML
.1-.4 INJECTION, SOLUTION INTRAMUSCULAR; INTRAVENOUS; SUBCUTANEOUS
Status: DISCONTINUED | OUTPATIENT
Start: 2017-03-16 | End: 2017-03-16 | Stop reason: HOSPADM

## 2017-03-16 RX ORDER — LIDOCAINE HYDROCHLORIDE AND EPINEPHRINE 10; 10 MG/ML; UG/ML
INJECTION, SOLUTION INFILTRATION; PERINEURAL PRN
Status: DISCONTINUED | OUTPATIENT
Start: 2017-03-16 | End: 2017-03-16 | Stop reason: HOSPADM

## 2017-03-16 RX ORDER — KETOROLAC TROMETHAMINE 30 MG/ML
30 INJECTION, SOLUTION INTRAMUSCULAR; INTRAVENOUS ONCE
Status: DISCONTINUED | OUTPATIENT
Start: 2017-03-16 | End: 2017-03-16 | Stop reason: HOSPADM

## 2017-03-16 RX ORDER — FENTANYL CITRATE 50 UG/ML
INJECTION, SOLUTION INTRAMUSCULAR; INTRAVENOUS PRN
Status: DISCONTINUED | OUTPATIENT
Start: 2017-03-16 | End: 2017-03-16

## 2017-03-16 RX ORDER — MAGNESIUM HYDROXIDE 1200 MG/15ML
LIQUID ORAL PRN
Status: DISCONTINUED | OUTPATIENT
Start: 2017-03-16 | End: 2017-03-16 | Stop reason: HOSPADM

## 2017-03-16 RX ORDER — PROPOFOL 10 MG/ML
INJECTION, EMULSION INTRAVENOUS CONTINUOUS PRN
Status: DISCONTINUED | OUTPATIENT
Start: 2017-03-16 | End: 2017-03-16

## 2017-03-16 RX ORDER — ONDANSETRON 2 MG/ML
4 INJECTION INTRAMUSCULAR; INTRAVENOUS EVERY 30 MIN PRN
Status: DISCONTINUED | OUTPATIENT
Start: 2017-03-16 | End: 2017-03-16 | Stop reason: HOSPADM

## 2017-03-16 RX ORDER — PHYSOSTIGMINE SALICYLATE 1 MG/ML
1.2 INJECTION INTRAVENOUS
Status: DISCONTINUED | OUTPATIENT
Start: 2017-03-16 | End: 2017-03-16 | Stop reason: HOSPADM

## 2017-03-16 RX ORDER — SODIUM CHLORIDE, SODIUM LACTATE, POTASSIUM CHLORIDE, CALCIUM CHLORIDE 600; 310; 30; 20 MG/100ML; MG/100ML; MG/100ML; MG/100ML
INJECTION, SOLUTION INTRAVENOUS CONTINUOUS PRN
Status: DISCONTINUED | OUTPATIENT
Start: 2017-03-16 | End: 2017-03-16

## 2017-03-16 RX ORDER — DEXAMETHASONE SODIUM PHOSPHATE 4 MG/ML
INJECTION, SOLUTION INTRA-ARTICULAR; INTRALESIONAL; INTRAMUSCULAR; INTRAVENOUS; SOFT TISSUE PRN
Status: DISCONTINUED | OUTPATIENT
Start: 2017-03-16 | End: 2017-03-16

## 2017-03-16 RX ADMIN — TRAMADOL HYDROCHLORIDE 50 MG: 50 TABLET, COATED ORAL at 11:26

## 2017-03-16 RX ADMIN — FENTANYL CITRATE 25 MCG: 50 INJECTION, SOLUTION INTRAMUSCULAR; INTRAVENOUS at 10:45

## 2017-03-16 RX ADMIN — LIDOCAINE HYDROCHLORIDE,EPINEPHRINE BITARTRATE 17 ML: 10; .01 INJECTION, SOLUTION INFILTRATION; PERINEURAL at 10:52

## 2017-03-16 RX ADMIN — FENTANYL CITRATE 25 MCG: 50 INJECTION, SOLUTION INTRAMUSCULAR; INTRAVENOUS at 10:39

## 2017-03-16 RX ADMIN — CEFAZOLIN SODIUM 2 G: 2 INJECTION, SOLUTION INTRAVENOUS at 10:33

## 2017-03-16 RX ADMIN — MIDAZOLAM HYDROCHLORIDE 2 MG: 1 INJECTION, SOLUTION INTRAMUSCULAR; INTRAVENOUS at 10:33

## 2017-03-16 RX ADMIN — DEXAMETHASONE SODIUM PHOSPHATE 4 MG: 4 INJECTION, SOLUTION INTRAMUSCULAR; INTRAVENOUS at 10:34

## 2017-03-16 RX ADMIN — SODIUM CHLORIDE 10 ML: 0.9 IRRIGANT IRRIGATION at 10:46

## 2017-03-16 RX ADMIN — FENTANYL CITRATE 50 MCG: 50 INJECTION, SOLUTION INTRAMUSCULAR; INTRAVENOUS at 10:33

## 2017-03-16 RX ADMIN — SCOPALAMINE 1 PATCH: 1 PATCH, EXTENDED RELEASE TRANSDERMAL at 09:35

## 2017-03-16 RX ADMIN — ONDANSETRON 4 MG: 2 INJECTION INTRAMUSCULAR; INTRAVENOUS at 10:41

## 2017-03-16 RX ADMIN — SODIUM CHLORIDE, POTASSIUM CHLORIDE, SODIUM LACTATE AND CALCIUM CHLORIDE: 600; 310; 30; 20 INJECTION, SOLUTION INTRAVENOUS at 10:34

## 2017-03-16 RX ADMIN — PROPOFOL 100 MCG/KG/MIN: 10 INJECTION, EMULSION INTRAVENOUS at 10:33

## 2017-03-16 RX ADMIN — Medication 1 EACH: at 10:46

## 2017-03-16 ASSESSMENT — LIFESTYLE VARIABLES: TOBACCO_USE: 1

## 2017-03-16 NOTE — DISCHARGE INSTRUCTIONS
Same Day Surgery Discharge Instructions for  Sedation and General Anesthesia       It's not unusual to feel dizzy, light-headed or faint for up to 24 hours after surgery or while taking pain medication.  If you have these symptoms: sit for a few minutes before standing and have someone assist you when you get up to walk or use the bathroom.      You should rest and relax for the next 24 hours. We recommend you make arrangements to have an adult stay with you for at least 24 hours after your discharge.  Avoid hazardous and strenuous activity.      DO NOT DRIVE any vehicle or operate mechanical equipment for 24 hours following the end of your surgery.  Even though you may feel normal, your reactions may be affected by the medication you have received.      Do not drink alcoholic beverages for 24 hours following surgery.       Slowly progress to your regular diet as you feel able. It's not unusual to feel nauseated and/or vomit after receiving anesthesia.  If you develop these symptoms, drink clear liquids (apple juice, ginger ale, broth, 7-up, etc. ) until you feel better.  If your nausea and vomiting persists for 24 hours, please notify your surgeon.        All narcotic pain medications, along with inactivity and anesthesia, can cause constipation. Drinking plenty of liquids and increasing fiber intake will help.      For any questions of a medical nature, call your surgeon.      Do not make important decisions for 24 hours.      If you had general anesthesia, you may have a sore throat for a couple of days related to the breathing tube used during surgery.  You may use Cepacol lozenges to help with this discomfort.  If it worsens or if you develop a fever, contact your surgeon.       If you feel your pain is not well managed with the pain medications prescribed by your surgeon, please contact your surgeon's office to let them know so they can address your concerns.         Discharge Instructions for Port  Removal        You may remove your bandage and shower in 24 hours.     You may resume normal activity as tolerated.      You may apply ice to the area for comfort.    Your incision was closed using a liquid adhesive.  Do not scratch, rub or pick at the film over your incision.  You may shower in 24 hours.  Do not soak in a tub for at least one week.  Do not apply lotions or cream to you incision.     Watch incision for signs of infection:  Redness  Swelling  Drainage  Temperature    Call your surgeon for questions and concerns.       Information for Patients Discharging with a Transderm Scopolamine Patch       Dry mouth is a common side effect.    Drowsiness is another common side effect especially when combined with pain medication.  Please avoid activities that require mental alertness such as driving a car or making important legal decisions.    Since Scopolamine can cause temporary dilation of the pupils and blurred vision if it comes in contact with the eyes; be sure to wash your hands thoroughly with soap and water immediately after handling the patch.   When you remove your patch, please stick it to a tissue or paper towel for disposal.      Remove the patch immediately and contact a physician in the unlikely event that you experience symptoms of acute glaucoma (pain and reddening of the eyes, accompanied by dilated pupils).    Remove the patch if you develop any difficulties urinating.  If you cannot urinate after removing your patch, please notify your surgeon.    Remove the patch 24 hours after surgery.      **If you have concerns or questions about your procedure,    please contact Dr Driscoll at  [phone]**

## 2017-03-16 NOTE — ANESTHESIA CARE TRANSFER NOTE
Patient: Kita Smith    Procedure(s):  PORT REMOVAL - Wound Class: I-Clean    Diagnosis: LEFT BREAST CANCER  Diagnosis Additional Information: No value filed.    Anesthesia Type:   MAC     Note:  Anesthesia Care Transfer Notewriter  VSS. Airway and IV patent. Patient comfortable. Report to RN. Stable care transfer.  Vitals: (Last set prior to Anesthesia Care Transfer)    CRNA VITALS  3/16/2017 1029 - 3/16/2017 1129      3/16/2017             Pulse: 91    SpO2: 98 %    Resp Rate (set): 10                Electronically Signed By: ELENA Doll CRNA  March 16, 2017  11:50 AM

## 2017-03-16 NOTE — ANESTHESIA POSTPROCEDURE EVALUATION
Patient: Kita Smith    Procedure(s):  PORT REMOVAL - Wound Class: I-Clean    Diagnosis:LEFT BREAST CANCER  Diagnosis Additional Information: No value filed.    Anesthesia Type:  MAC    Note:  Anesthesia Post Evaluation    Patient location during evaluation: PACU  Patient participation: Able to fully participate in evaluation  Level of consciousness: awake  Pain management: adequate  Airway patency: patent  Cardiovascular status: acceptable  Respiratory status: acceptable  Hydration status: acceptable  PONV: none     Anesthetic complications: None          Last vitals:  Vitals:    03/16/17 0904 03/16/17 1100 03/16/17 1115   BP: 136/81 94/58 112/71   Resp: 16 14 13   Temp: 36.8  C (98.3  F) 36.3  C (97.4  F)    SpO2: 98% 97% 97%         Electronically Signed By: Will Hill MD  March 16, 2017  11:18 AM

## 2017-03-16 NOTE — ANESTHESIA PREPROCEDURE EVALUATION
Anesthesia Evaluation     . Pt has had prior anesthetic.     History of anesthetic complications  - PONV    ROS/MED HX    ENT/Pulmonary:     (+)tobacco use, Past use , . .   (-) sleep apnea   Neurologic:       Cardiovascular:         METS/Exercise Tolerance:     Hematologic:         Musculoskeletal:   (+) arthritis, , , -       GI/Hepatic:     (+) GERD Asymptomatic on medication,       Renal/Genitourinary:         Endo:         Psychiatric:     (+) psychiatric history anxiety and depression      Infectious Disease:         Malignancy:   (+) Malignancy History of Breast  Breast CA Remission status post.         Other:               Physical Exam  Normal systems: cardiovascular and pulmonary    Airway   Mallampati: I  Neck ROM: full    Dental     Cardiovascular       Pulmonary                     Anesthesia Plan      History & Physical Review  History and physical reviewed and following examination; no interval change.    ASA Status:  2 .    NPO Status:  > 6 hours    Plan for MAC   PONV prophylaxis:  Ondansetron (or other 5HT-3) and Dexamethasone or Solumedrol  Scopolamine patch placed pre-op      Postoperative Care  Postoperative pain management:  IV analgesics and Oral pain medications.      Consents  Anesthetic plan, risks, benefits and alternatives discussed with:  Patient..                          .  DPreop diagnosis: LEFT BREAST CANCER  Procedure(s):  REMOVE PORT VASCULAR ACCESS  Allergies   Allergen Reactions     Percocet [Oxycodone-Acetaminophen] Nausea     Adhesive Tape Blisters     REDNESS,  bandaides     Hydrocodone Nausea and Vomiting     Latex      Wound Dressing Adhesive        No current facility-administered medications on file prior to encounter.   Current Outpatient Prescriptions on File Prior to Encounter:  anastrozole (ARIMIDEX) 1 MG tablet Take 1 tablet (1 mg) by mouth daily   traMADol (ULTRAM) 50 MG tablet Take 1 tablet (50 mg) by mouth every 6 hours as needed for pain (maximum 8 tablets per  day)   B Complex-C TABS    vitamin  s/Minerals TABS    zolpidem (AMBIEN) 10 MG tablet Take 10 mg by mouth   LORazepam (ATIVAN) 0.5 MG tablet Take 1 tablet (0.5 mg) by mouth every 4 hours as needed (Anxiety, Nausea/Vomiting or Sleep)   amitriptyline (ELAVIL) 10 MG tablet Take 10-20 mg by mouth Reported on 2/28/2017   coenzyme Q-10 10 MG CAPS Take 200 mg by mouth   calcium carbonate (TUMS) 500 MG chewable tablet Take 1 chew tab by mouth daily Reported on 2/28/2017   omeprazole (PRILOSEC) 40 MG capsule Take 1 capsule (40 mg) by mouth daily Take 30-60 minutes before a meal.   ondansetron (ZOFRAN) 8 MG tablet Take 1 tablet (8 mg) by mouth every 8 hours as needed for nausea   Ondansetron (ZOFRAN ODT PO) Take 4 mg by mouth Reported on 2/28/2017   olopatadine (PATANOL) 0.1 % ophthalmic solution Place 1 drop into both eyes 2 times daily as needed for allergies     Hemoglobin   Date Value Ref Range Status   03/07/2017 13.1 11.7 - 15.7 g/dL Final     Potassium   Date Value Ref Range Status   03/07/2017 4.2 3.4 - 5.3 mmol/L Final

## 2017-03-16 NOTE — IP AVS SNAPSHOT
[facility]    [address]    [place] [address]    Phone:  [phone]    Fax:  [phone]                                       After Visit Summary   3/16/2017    Kita Smith    MRN: [id number]           After Visit Summary Signature Page     I have received my discharge instructions, and my questions have been answered. I have discussed any challenges I see with this plan with the nurse or doctor.    ..........................................................................................................................................  Patient/Patient Representative Signature      ..........................................................................................................................................  Patient Representative Print Name and Relationship to Patient    ..................................................               ................................................  Date                                            Time    ..........................................................................................................................................  Reviewed by Signature/Title    ...................................................              ..............................................  Date                                                            Time

## 2017-03-16 NOTE — ANESTHESIA POSTPROCEDURE EVALUATION
Patient: Kita Smith    Procedure(s):  PORT REMOVAL - Wound Class: I-Clean    Diagnosis:LEFT BREAST CANCER  Diagnosis Additional Information: No value filed.    Anesthesia Type:  MAC    Note:  Anesthesia Post Evaluation    Patient location during evaluation: PACU  Patient participation: Able to fully participate in evaluation  Level of consciousness: awake  Pain management: adequate  Airway patency: patent  Cardiovascular status: acceptable  Respiratory status: acceptable  Hydration status: acceptable  PONV: none     Anesthetic complications: None          Last vitals:  Vitals:    03/16/17 1115 03/16/17 1130 03/16/17 1211   BP: 112/71 100/68 118/87   Resp: 13 18 18   Temp:  36.4  C (97.6  F)    SpO2: 97% 97% 98%         Electronically Signed By: Will Hill MD  March 16, 2017  1:30 PM

## 2017-03-16 NOTE — IP AVS SNAPSHOT
MRN:[id number]                      After Visit Summary   3/16/2017    Kita Smith    MRN: [id number]           Thank you!     Thank you for choosing [facility] for your care. Our goal is always to provide you with excellent care. Hearing back from our patients is one way we can continue to improve our services. Please take a few minutes to complete the written survey that you may receive in the mail after you visit with us. Thank you!        Patient Information     Date Of Birth          1960        About your hospital stay     You were admitted on:  March 16, 2017 [facility]    You were discharged on:  March 16, 2017       Who to Call     For medical emergencies, please call 911.  For non-urgent questions about your medical care, please call your primary care provider or clinic, [phone]  For questions related to your surgery, please call your surgery clinic        Attending Provider     Provider Hebert Niño MD Surgery       Primary Care Provider Office Phone # Fax #    Checo Crockett [phone]       [facility] [place] [facility] [address] [place] [address]        After Care Instructions     Discharge Instructions       No follow up needed            Dressing       Dermabond   General Care:  Keep the wound clean and dry. You may shower or bathe tomorrow as usual, but do not use soaps, lotions, or ointments on the wound area. Do not scrub the wound. After bathing, pat the wound dry with a soft towel.  Do not scratch, rub, or pick at the strips or film. Do not place tape directly over the strips or film.  Do not apply liquids (such as peroxide), ointments, or creams to the wound while the strips or film are in place.  Most  wounds heal without problems. However, an infection sometimes occurs despite proper treatment. Therefore, watch for the signs of infection listed  below.  FOLLOW UP as directed by the doctor or our staff. The skin glue strips or film will fall off naturally in 5 to 10 days.                  Your next 10 appointments already scheduled     Mar 23, 2017  2:15 PM CDT   Lymphedema Treatment with Tamar Elias OT   [facility]hedema OT ([facility])    [address] [place] [address] [address]  [facility] [address]   [phone]            Mar 27, 2017  3:00 PM CDT   Evaluation with Kaylin Bolanos, PT   [facility] ([facility])    [address]  [facility] [address]   [phone]            Apr 21, 2017  8:00 AM CDT   Return Visit with  Oncology Nurse   [facility] ([facility])    [facility]  [address]  [facility] [address]   [phone]            Apr 21, 2017  8:30 AM CDT   Return Visit with Preeti Jasso MD   [facility] ([facility])    [facility]  [address]  [facility] [address]   [phone]              Further instructions from your care team       Same Day Surgery Discharge Instructions for  Sedation and General Anesthesia       It's not unusual to feel dizzy, light-headed or faint for up to 24 hours after surgery or while taking pain medication.  If you have these symptoms: sit for a few minutes before standing and have someone assist you when you get up to walk or use the bathroom.      You should rest and relax for the next 24 hours. We recommend you make arrangements to have an adult stay with you for at least 24 hours after your discharge.  Avoid hazardous and strenuous activity.      DO NOT DRIVE any vehicle or operate mechanical equipment for 24 hours following the end of your surgery.  Even though you may feel normal, your reactions may be affected by the medication you have received.      Do not drink alcoholic beverages for 24 hours following surgery.        Slowly progress to your regular diet as you feel able. It's not unusual to feel nauseated and/or vomit after receiving anesthesia.  If you develop these symptoms, drink clear liquids (apple juice, ginger ale, broth, 7-up, etc. ) until you feel better.  If your nausea and vomiting persists for 24 hours, please notify your surgeon.        All narcotic pain medications, along with inactivity and anesthesia, can cause constipation. Drinking plenty of liquids and increasing fiber intake will help.      For any questions of a medical nature, call your surgeon.      Do not make important decisions for 24 hours.      If you had general anesthesia, you may have a sore throat for a couple of days related to the breathing tube used during surgery.  You may use Cepacol lozenges to help with this discomfort.  If it worsens or if you develop a fever, contact your surgeon.       If you feel your pain is not well managed with the pain medications prescribed by your surgeon, please contact your surgeon's office to let them know so they can address your concerns.         Discharge Instructions for Port Removal        You may remove your bandage and shower in 24 hours.     You may resume normal activity as tolerated.      You may apply ice to the area for comfort.    Your incision was closed using a liquid adhesive.  Do not scratch, rub or pick at the film over your incision.  You may shower in 24 hours.  Do not soak in a tub for at least one week.  Do not apply lotions or cream to you incision.     Watch incision for signs of infection:  Redness  Swelling  Drainage  Temperature    Call your surgeon for questions and concerns.       Information for Patients Discharging with a Transderm Scopolamine Patch       Dry mouth is a common side effect.    Drowsiness is another common side effect especially when combined with pain medication.  Please avoid activities that require mental alertness such as driving a car or making important  "legal decisions.    Since Scopolamine can cause temporary dilation of the pupils and blurred vision if it comes in contact with the eyes; be sure to wash your hands thoroughly with soap and water immediately after handling the patch.   When you remove your patch, please stick it to a tissue or paper towel for disposal.      Remove the patch immediately and contact a physician in the unlikely event that you experience symptoms of acute glaucoma (pain and reddening of the eyes, accompanied by dilated pupils).    Remove the patch if you develop any difficulties urinating.  If you cannot urinate after removing your patch, please notify your surgeon.    Remove the patch 24 hours after surgery.      **If you have concerns or questions about your procedure,    please contact Dr Driscoll at  [phone]**              Pending Results     No orders found from 3/14/2017 to 3/17/2017.            Admission Information     Date & Time Provider Department Dept. Phone    3/16/2017 Hebert Driscoll MD [facility] Same Day Surgery [phone]      Your Vitals Were     Blood Pressure Temperature Respirations Height Weight Last Period    100/68 97.6  F (36.4  C) (Temporal) 18 1.676 m (5' 6\") 59 kg (130 lb) 2006    Pulse Oximetry BMI (Body Mass Index)                97% 20.98 kg/m2          [employer] Information     [employer] lets you send messages to your doctor, view your test results, renew your prescriptions, schedule appointments and more. To sign up, go to [email or web address] . Click on \"Log in\" on the left side of the screen, which will take you to the Welcome page. Then click on \"Sign up Now\" on the right side of the page.     You will be asked to enter the access code listed below, as well as some personal information. Please follow the directions to create your username and password.     Your access code is: [email or web address]  Expires: 2017  4:38 PM     Your access code will  in 90 days. If you need " help or a new code, please call your [facility] clinic or [phone].        Care EveryWhere ID     This is your Care EveryWhere ID. This could be used by other organizations to access your [facility] medical records  [id number]           Review of your medicines      CONTINUE these medicines which have NOT CHANGED        Dose / Directions    amitriptyline 10 MG tablet   Commonly known as:  ELAVIL        Dose:  10-20 mg   Take 10-20 mg by mouth Reported on 2/28/2017   Refills:  0       anastrozole 1 MG tablet   Commonly known as:  ARIMIDEX   Used for:  Malignant neoplasm of overlapping sites of left female breast (H)        Dose:  1 mg   Take 1 tablet (1 mg) by mouth daily   Quantity:  90 tablet   Refills:  3       B Complex-C Tabs        Refills:  0       CALCIUM + D3 PO        Refills:  0       calcium carbonate 500 MG chewable tablet   Commonly known as:  TUMS        Dose:  1 chew tab   Take 1 chew tab by mouth daily Reported on 2/28/2017   Refills:  0       coenzyme Q-10 10 MG Caps        Dose:  200 mg   Take 200 mg by mouth   Refills:  0       LORazepam 0.5 MG tablet   Commonly known as:  ATIVAN   Used for:  Malignant neoplasm of overlapping sites of left female breast (H)        Dose:  0.5 mg   Take 1 tablet (0.5 mg) by mouth every 4 hours as needed (Anxiety, Nausea/Vomiting or Sleep)   Quantity:  30 tablet   Refills:  2       olopatadine 0.1 % ophthalmic solution   Commonly known as:  PATANOL        Dose:  1 drop   Place 1 drop into both eyes 2 times daily as needed for allergies   Refills:  0       omeprazole 40 MG capsule   Commonly known as:  priLOSEC   Used for:  Gastroesophageal reflux disease without esophagitis        Dose:  40 mg   Take 1 capsule (40 mg) by mouth daily Take 30-60 minutes before a meal.   Quantity:  90 capsule   Refills:  3       ondansetron 8 MG tablet   Commonly known as:  ZOFRAN   Used for:  Nausea        Dose:  8 mg   Take 1 tablet (8 mg) by mouth every 8 hours as needed for nausea    Quantity:  30 tablet   Refills:  3       traMADol 50 MG tablet   Commonly known as:  ULTRAM   Used for:  Malignant neoplasm of overlapping sites of left female breast (H)        Dose:  50 mg   Take 1 tablet (50 mg) by mouth every 6 hours as needed for pain (maximum 8 tablets per day)   Quantity:  60 tablet   Refills:  1       vitamin  s/Minerals Tabs        Refills:  0       ZOFRAN ODT PO   Indication:  Historical--Rx from  (PCP)        Dose:  4 mg   Take 4 mg by mouth Reported on 2/28/2017   Refills:  0       zolpidem 10 MG tablet   Commonly known as:  AMBIEN        Dose:  10 mg   Take 10 mg by mouth   Refills:  0                Protect others around you: Learn how to safely use, store and throw away your medicines at www.disposemymeds.org.             Medication List: This is a list of all your medications and when to take them. Check marks below indicate your daily home schedule. Keep this list as a reference.      Medications           Morning Afternoon Evening Bedtime As Needed    amitriptyline 10 MG tablet   Commonly known as:  ELAVIL   Take 10-20 mg by mouth Reported on 2/28/2017                                anastrozole 1 MG tablet   Commonly known as:  ARIMIDEX   Take 1 tablet (1 mg) by mouth daily                                B Complex-C Tabs                                CALCIUM + D3 PO                                calcium carbonate 500 MG chewable tablet   Commonly known as:  TUMS   Take 1 chew tab by mouth daily Reported on 2/28/2017                                coenzyme Q-10 10 MG Caps   Take 200 mg by mouth                                LORazepam 0.5 MG tablet   Commonly known as:  ATIVAN   Take 1 tablet (0.5 mg) by mouth every 4 hours as needed (Anxiety, Nausea/Vomiting or Sleep)                                olopatadine 0.1 % ophthalmic solution   Commonly known as:  PATANOL   Place 1 drop into both eyes 2 times daily as needed for allergies                                 omeprazole 40 MG capsule   Commonly known as:  priLOSEC   Take 1 capsule (40 mg) by mouth daily Take 30-60 minutes before a meal.                                ondansetron 8 MG tablet   Commonly known as:  ZOFRAN   Take 1 tablet (8 mg) by mouth every 8 hours as needed for nausea                                traMADol 50 MG tablet   Commonly known as:  ULTRAM   Take 1 tablet (50 mg) by mouth every 6 hours as needed for pain (maximum 8 tablets per day)   Last time this was given:  50 mg on 3/16/2017 11:26 AM                                vitamin  s/Minerals Tabs                                ZOFRAN ODT PO   Take 4 mg by mouth Reported on 2/28/2017                                zolpidem 10 MG tablet   Commonly known as:  AMBIEN   Take 10 mg by mouth

## 2017-03-16 NOTE — BRIEF OP NOTE
[facility] Brief Operative Note    Pre-operative diagnosis: LEFT BREAST CANCER   Post-operative diagnosis Breast cancer      Procedure: Procedure(s):  PORT REMOVAL - Wound Class: I-Clean   Surgeon(s): Surgeon(s) and Role:     * Hebert Driscoll MD - Primary     * Elaine Mueller PA-C - Assisting   Estimated blood loss: 2 mL    Specimens: * No specimens in log *   Findings: Same as above    Elaine Mueller PA-C

## 2017-03-21 ENCOUNTER — TELEPHONE (OUTPATIENT)
Dept: ONCOLOGY | Facility: CLINIC | Age: 57
End: 2017-03-21

## 2017-03-21 NOTE — TELEPHONE ENCOUNTER
Bone density reviewed by Dr. Jasso, per Dr. Jasso patient to take daily calcium with vitamin D. Patient to take calcium 1200 mg and vitamin D 800 mg. Patient called and she is aware. Kary Schultz RN

## 2017-03-23 ENCOUNTER — HOSPITAL ENCOUNTER (OUTPATIENT)
Dept: OCCUPATIONAL THERAPY | Facility: CLINIC | Age: 57
Setting detail: THERAPIES SERIES
End: 2017-03-23
Attending: FAMILY MEDICINE
Payer: COMMERCIAL

## 2017-03-23 PROCEDURE — 97140 MANUAL THERAPY 1/> REGIONS: CPT | Mod: GO

## 2017-03-23 PROCEDURE — 40000445 ZZHC STATISTIC OT VISIT, LYMPHEDEMA

## 2017-03-23 PROCEDURE — 97110 THERAPEUTIC EXERCISES: CPT | Mod: GO

## 2017-03-27 ENCOUNTER — HOSPITAL ENCOUNTER (OUTPATIENT)
Dept: PHYSICAL THERAPY | Facility: CLINIC | Age: 57
Setting detail: THERAPIES SERIES
End: 2017-03-27
Attending: INTERNAL MEDICINE
Payer: COMMERCIAL

## 2017-03-27 PROCEDURE — 97161 PT EVAL LOW COMPLEX 20 MIN: CPT | Mod: GP | Performed by: PHYSICAL THERAPIST

## 2017-03-27 PROCEDURE — 40000360 ZZHC STATISTIC PT CANCER REHAB VISIT: Performed by: PHYSICAL THERAPIST

## 2017-03-27 PROCEDURE — 97110 THERAPEUTIC EXERCISES: CPT | Mod: GP | Performed by: PHYSICAL THERAPIST

## 2017-03-27 NOTE — PROGRESS NOTES
03/27/17 1400   Quick Adds   Type of Visit Initial OP PT Evaluation   General Information   Start of Care Date 03/27/17   Referring Physician Preeti Jasso MD   Orders Evaluate and Treat as Indicated   Additional Orders Lymphedema, CA rehab- arm and back pain   Order Date 03/07/17   Medical Diagnosis Malignant neoplasm of overlapping sites left female breast   Onset of illness/injury or Date of Surgery (initial diagnosis 2006)   Surgical/Medical history reviewed Yes   Pertinent history of current problem (include personal factors and/or comorbidities that impact the POC) Initially diagnosed in 2006 with left breast adenocarcinoma, s/p bilateral mastectomy, hysterectomy and bilateral salpino-oophorectomy. Found to have melanoma occurance in 2012 and reoccurance in 2016. Also had a reoccurance of left breast lesion 6/27/16 with excision and left axillary dissection with 33 nodes removed, chemo ended 11/25/16 and radiation 2/17, complicated byinfection, undetected x1 month . Now having onset of left arm and back pain, seeing lymphedem for management of edema.    Current Community Support Family/friend caregiver   Patient role/Employment history Employed   Living environment [facility]   Home/Community Accessibility Comments Lives with 2 children, 17 and 19yo, single parent. Working full time, took very minimal time off during treatments. 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   Assistive Devices Comments no Ad   Patient/Family Goals Statement I want to feel better and stronger and stop my shoulder from sticking out.   Fall Risk Screen   Fall screen completed by PT   Per patient - Fall 2 or more times in past year? No   Per patient - Fall with injury in past year? No   Is patient a fall risk? No   System Outcome Measures   FACIT Fatigue Subscale (score out of 52). The higher the score, the better the QOL. 26   Pain   Patient currently in pain No   Cognitive Status Examination   Orientation  "orientation to person, place and time   Level of Consciousness alert   Follows Commands and Answers Questions 100% of the time   Personal Safety and Judgment intact   Memory intact   Observation   Observation bilateral scapula winging, left greater than right. Approximately 1\" left, 1/2 inch right protrustion   Integumentary   Integumentary Comments Port removed 12 days ago, well healing incision visualized. Pt very tender since implants put in 2008 and unable to lay supine.   Posture   Posture Comments guarded left shoulder from not moving, stiffness and tightness.    Range of Motion (ROM)   ROM Comment Left shoulder: AROM flexion 170 degrees, right 180 degrees, left shoulder ABD: 150 degrees palm down, 159 degrees palm up, right shoulder abd palm up/down 165 degrees. left AROM Apley IR: T8, ER T3   Strength   Strength Comments Left shoulder flex/abd within available range (lacks full 180) 3/5 without pain   Bed Mobility   Bed Mobility Comments independent   Transfer Skills   Transfer Comments independent   Gait   Gait Comments ambulates independently, no fall or balance concerns. Reports she plans to start walking again on her lunch hour at work   Sensory Examination   Sensory Perception Comments numbness since chemo in fingertips and bilateral feet, improving and now more of a pins and needle sensation   Muscle Tone   Muscle Tone Comments mild atrophy in left shoulder region   Modality Interventions   Planned Modality Interventions Cryotherapy;Electrical Stimulation/Russion Stimulation   Planned Therapy Interventions   Planned Therapy Interventions ROM;strengthening;stretching;manual therapy   Clinical Impression   Criteria for Skilled Therapeutic Interventions Met yes, treatment indicated   PT Diagnosis Decreased functional mobility   Influenced by the following impairments left shoulder range and strength deficits   Functional limitations due to impairments difficutly reaching, lifting, carrying objects, " dressing, bathing and decreased energy and activity tolerance related to significant fatigue.   Clinical Presentation Stable/Uncomplicated   Clinical Presentation Rationale progressing as expected   Clinical Decision Making (Complexity) Low complexity   Therapy Frequency other (see comments)   Predicted Duration of Therapy Intervention (days/wks) 12 weeks- every other week   Risk & Benefits of therapy have been explained Yes   Patient, Family & other staff in agreement with plan of care Yes   Clinical Impression Comments y   Education Assessment   Preferred Learning Style Listening   Barriers to Learning No barriers   GOALS   PT Eval Goals 1;2;3;4   Goal 1   Goal Identifier LIFTING   Goal Description 1. Patient will report being able to lift dishes and place into high cabinets in her home.   Target Date 06/25/17   Goal 2   Goal Identifier Hair drying   Goal Description 2. Patient will be able to style her hair, including holding a hair drying for the duration needed to dry and style her hair on a daily basis.   Target Date 06/25/17   Goal 3   Goal Identifier CARRYING   Goal Description 3. Patient will be independent with carrying greater than 10#, groceries or pet foot/litter in order to be successful in her home environment.   Target Date 06/25/17   Goal 4   Goal Identifier FATIGUE   Goal Description 4.Patient will improve her fatigue FACIT score to greater than 30, increased from 26 to indicate lower levels of fatigue and more energy in the afternoon and after work in order to exercise and meet family and friends.   Target Date 06/25/17   Total Evaluation Time   Total Evaluation Time (Minutes) 25

## 2017-04-10 ENCOUNTER — HOSPITAL ENCOUNTER (OUTPATIENT)
Dept: PHYSICAL THERAPY | Facility: CLINIC | Age: 57
Setting detail: THERAPIES SERIES
End: 2017-04-10
Attending: INTERNAL MEDICINE
Payer: COMMERCIAL

## 2017-04-10 PROCEDURE — 40000360 ZZHC STATISTIC PT CANCER REHAB VISIT: Performed by: PHYSICAL THERAPIST

## 2017-04-10 PROCEDURE — 97110 THERAPEUTIC EXERCISES: CPT | Mod: GP | Performed by: PHYSICAL THERAPIST

## 2017-04-21 ENCOUNTER — ONCOLOGY VISIT (OUTPATIENT)
Dept: ONCOLOGY | Facility: CLINIC | Age: 57
End: 2017-04-21
Attending: INTERNAL MEDICINE
Payer: COMMERCIAL

## 2017-04-21 ENCOUNTER — HOSPITAL ENCOUNTER (OUTPATIENT)
Facility: CLINIC | Age: 57
Setting detail: SPECIMEN
Discharge: HOME | End: 2017-04-21
Attending: INTERNAL MEDICINE | Admitting: INTERNAL MEDICINE
Payer: COMMERCIAL

## 2017-04-21 VITALS
SYSTOLIC BLOOD PRESSURE: 107 MMHG | HEART RATE: 72 BPM | DIASTOLIC BLOOD PRESSURE: 73 MMHG | BODY MASS INDEX: 20.83 KG/M2 | HEIGHT: 66 IN | WEIGHT: 129.6 LBS | TEMPERATURE: 97.2 F

## 2017-04-21 DIAGNOSIS — C50.812 MALIGNANT NEOPLASM OF OVERLAPPING SITES OF LEFT FEMALE BREAST (H): ICD-10-CM

## 2017-04-21 DIAGNOSIS — C50.812 MALIGNANT NEOPLASM OF OVERLAPPING SITES OF LEFT FEMALE BREAST (H): Primary | ICD-10-CM

## 2017-04-21 LAB
BASOPHILS # BLD AUTO: 0 10E9/L (ref 0–0.2)
BASOPHILS NFR BLD AUTO: 0.9 %
DIFFERENTIAL METHOD BLD: ABNORMAL
EOSINOPHIL # BLD AUTO: 0.1 10E9/L (ref 0–0.7)
EOSINOPHIL NFR BLD AUTO: 1.4 %
ERYTHROCYTE [DISTWIDTH] IN BLOOD BY AUTOMATED COUNT: 13.3 % (ref 10–15)
HCT VFR BLD AUTO: 39.8 % (ref 35–47)
HGB BLD-MCNC: 13.7 G/DL (ref 11.7–15.7)
IMM GRANULOCYTES # BLD: 0 10E9/L (ref 0–0.4)
IMM GRANULOCYTES NFR BLD: 0 %
LYMPHOCYTES # BLD AUTO: 0.9 10E9/L (ref 0.8–5.3)
LYMPHOCYTES NFR BLD AUTO: 25.9 %
MCH RBC QN AUTO: 31.4 PG (ref 26.5–33)
MCHC RBC AUTO-ENTMCNC: 34.4 G/DL (ref 31.5–36.5)
MCV RBC AUTO: 91 FL (ref 78–100)
MONOCYTES # BLD AUTO: 0.5 10E9/L (ref 0–1.3)
MONOCYTES NFR BLD AUTO: 13.9 %
NEUTROPHILS # BLD AUTO: 2 10E9/L (ref 1.6–8.3)
NEUTROPHILS NFR BLD AUTO: 57.9 %
NRBC # BLD AUTO: 0 10*3/UL
NRBC BLD AUTO-RTO: 0 /100
PLATELET # BLD AUTO: 175 10E9/L (ref 150–450)
RBC # BLD AUTO: 4.37 10E12/L (ref 3.8–5.2)
WBC # BLD AUTO: 3.5 10E9/L (ref 4–11)

## 2017-04-21 PROCEDURE — 99212 OFFICE O/P EST SF 10 MIN: CPT

## 2017-04-21 PROCEDURE — 85025 COMPLETE CBC W/AUTO DIFF WBC: CPT | Performed by: INTERNAL MEDICINE

## 2017-04-21 PROCEDURE — 36415 COLL VENOUS BLD VENIPUNCTURE: CPT

## 2017-04-21 PROCEDURE — 99215 OFFICE O/P EST HI 40 MIN: CPT | Performed by: INTERNAL MEDICINE

## 2017-04-21 PROCEDURE — 99211 OFF/OP EST MAY X REQ PHY/QHP: CPT

## 2017-04-21 ASSESSMENT — PAIN SCALES - GENERAL: PAINLEVEL: SEVERE PAIN (7)

## 2017-04-21 NOTE — LETTER
April 21, 2017      RE: Kita Smith  [address] [place] [address]      [facility]    Hematology/Oncology Established Patient Follow-up Note      Today's Date: 04/21/2017    Reason for Follow-up: history of breast cancer and melanoma    HISTORY OF PRESENT ILLNESS: Kita Smith is a 56 year old female who is being followed for adenocarcinoma of the breast as well as melanoma. She was previously followed by Dr. Minor.  Kita is known to be BRCA-2 positive. She has undergone bilateral mastectomy.  She has also undergone hysterectomy and bilateral salpingo-oophorectomy on 04/06/2013 for prophylaxis of ovarian and uterine cancer.     She first presented with multifocal lesions in the left breast in 2006. She underwent left mastectomy on 01/28/2006 demonstrating multifocal infiltrating ductal adenocarcinoma, grade 2, with DCIS. She had 3 separate lesions measuring 1.5 cm, 0.5 cm, and 0.4 cm. All lymph nodes were negative. The tumor was ER/OH positive and HER-2 negative. She received 4 cycles of dose-dense Adriamycin and Cytoxan and was then on tamoxifen through 2012 when it was discontinued.     In the summer of 2012, Kita noted a hyperpigmented lesion over the right neck which was rapidly growing and changing over 1-2 months. The lesion was excised by Dr. Nataliia Baron. The lesion was a Santiago level III melanoma Breslow depth 0.5 mm.   Stage IA.  A wide excision was subsequently performed by Dr. Nataliia Baron, with 1 cm margins and no evidence of residual disease. The patient was subsequently referred to Dr. Childers. A PET-CT showed no evidence of metastatic disease. 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 Bazan of [facility] in March 2016.  This was stage 0.    On a CT scan study done on 10/12/2010 she was noted to have multiple bilateral pulmonary nodules, which were felt to be  granulomatous. These have been followed; these were PET negative. Her most recent CT of the chest done 04/05/2013 showed that the tiny pulmonary nodules were stable over 3 years and therefore these are no longer followed on a routine basis.      PET-CT in June 2016 showed left axillary lymphadenopathy and biopsy of lymph node showed metastatic breast carcinoma, consistent with recurrence.  On 6/27/16, she underwent left lateral breast lesion excision and left axillary dissection.  Pathology showed invasive ductal carcinoma, grade 3, tumor size 1.5 cm +LVI, 6 of 31 lymph nodes were involved, ER positive (75%), TX positive (5%), HER-2 negative.  4 of 29 lymph node were positive in the axillary dissection.  There was excision of a second breast nodule that also had metastatic breast cancer.      She began chemotherapy on 8/11/16, and completed docetaxel+carboplatin x 6 cycles on 11/25/16.  She completed radiation in late February 2017.      Port was removed on 3/16/17.    She started anastrozole on 3/16/17.      INTERIM HISTORY:  Kita comes in for follow-up today.   She has been under much stress with her daughter, who ran away last night.  She has been a lot of psychological issues.  She says that she has a mild headache after taking the anastrozole.  Her left arm numbness is improving, and physical therapy has been helping.  She expresses a lot of anxiety over possibility of her cancer returning.        REVIEW OF SYSTEMS:   14 point ROS was reviewed and is negative other than as noted above in HPI.       HOME MEDICATIONS:  Current Outpatient Prescriptions   Medication Sig Dispense Refill     Calcium Carb-Cholecalciferol (CALCIUM + D3 PO)        anastrozole (ARIMIDEX) 1 MG tablet Take 1 tablet (1 mg) by mouth daily 90 tablet 3     traMADol (ULTRAM) 50 MG tablet Take 1 tablet (50 mg) by mouth every 6 hours as needed for pain (maximum 8 tablets per day) 60 tablet 1     B Complex-C TABS        coenzyme Q-10 10 MG CAPS  Take 200 mg by mouth       vitamin  s/Minerals TABS        zolpidem (AMBIEN) 10 MG tablet Take 10 mg by mouth       LORazepam (ATIVAN) 0.5 MG tablet Take 1 tablet (0.5 mg) by mouth every 4 hours as needed (Anxiety, Nausea/Vomiting or Sleep) 30 tablet 2     olopatadine (PATANOL) 0.1 % ophthalmic solution Place 1 drop into both eyes 2 times daily as needed for allergies       amitriptyline (ELAVIL) 10 MG tablet Take 10-20 mg by mouth Reported on 2017       calcium carbonate (TUMS) 500 MG chewable tablet Take 1 chew tab by mouth daily Reported on 2017       omeprazole (PRILOSEC) 40 MG capsule Take 1 capsule (40 mg) by mouth daily Take 30-60 minutes before a meal. (Patient not taking: Reported on 2017) 90 capsule 3     ondansetron (ZOFRAN) 8 MG tablet Take 1 tablet (8 mg) by mouth every 8 hours as needed for nausea (Patient not taking: Reported on 2017) 30 tablet 3     Ondansetron (ZOFRAN ODT PO) Take 4 mg by mouth Reported on 2017           ALLERGIES:  Allergies   Allergen Reactions     Percocet [Oxycodone-Acetaminophen] Nausea     Adhesive Tape Blisters     REDNESS,  bandaides     Hydrocodone Nausea and Vomiting     Latex      Wound Dressing Adhesive          PAST MEDICAL HISTORY:  Past Medical History:   Diagnosis Date     Basal cell carcinoma      Breast CA (H)      Depression with anxiety      Histoplasmosis      Malignant melanoma (H)      PONV (postoperative nausea and vomiting)          PAST SURGICAL HISTORY:  Past Surgical History:   Procedure Laterality Date     BIOPSY OF SKIN LESION       BREAST SURGERY      bilat mastectomy       C RAD RESEC TONSIL/PILLARS        SECTION      times 2     CL AFF SURGICAL PATHOLOGY      left wrist     DAVINCI HYSTERECTOMY TOTAL, BILATERAL SALPINGO-OOPHORECTOMY, COMBINED  2013    Procedure: COMBINED DAVINCI HYSTERECTOMY TOTAL, SALPINGO-OOPHORECTOMY;  ROBOTIC ASSISTED TOTAL LAPAROSCOPIC HYSTERECTOMY, BILATERAL SALPINGO OOPHORECTOMY,  PARTIAL VULVECTOMY ;  Surgeon: Hira eJnkins MD;  Location:  OR     DISSECT LYMPH NODE AXILLA Left 6/27/2016    Procedure: DISSECT LYMPH NODE AXILLA;  Surgeon: Hebert Driscoll MD;  Location:  OR     GENERAL SURGERY                           DATE:  1996 &2000    C section     HC REVISE BREAST RECONSTRUCTION       HEMORRHOIDECTOMY  1983     INSERT PORT VASCULAR ACCESS Right 6/27/2016    Procedure: INSERT PORT VASCULAR ACCESS;  Surgeon: Hebert Driscoll MD;  Location:  OR     MASTECTOMY       melanoma removal[      right sided neck     MOHS MICROGRAPHIC PROCEDURE       PROCTOPLASTY  4/13/2013    Procedure: PROCTOPLASTY;  PROCTOPLASTY TO REPAIR ANAL FISSURE AND STENOSIS;  Surgeon: Goldberg, Stanley Morton, MD;  Location: [facility]     REMOVE PORT VASCULAR ACCESS N/A 3/16/2017    Procedure: REMOVE PORT VASCULAR ACCESS;  Surgeon: Hebert Driscoll MD;  Location: [facility]     VULVECTOMY SIMPLE  4/26/2013    Procedure: VULVECTOMY SIMPLE;;  Surgeon: Hira Jenkins MD;  Location:  OR         SOCIAL HISTORY:  Social History     Social History     Marital status: Single     Spouse name: N/A     Number of children: N/A     Years of education: N/A     Occupational History     Not on file.     Social History Main Topics     Smoking status: Former Smoker     Packs/day: 1.50     Years: 4.00     Types: Cigarettes     Quit date: 1/11/1980     Smokeless tobacco: Never Used     Alcohol use Yes      Comment: occasionally     Drug use: No     Sexual activity: Not on file     Other Topics Concern     Not on file     Social History Narrative         FAMILY HISTORY:  Family History   Problem Relation Age of Onset     CANCER Mother      brain     Other Cancer Mother      Cancer - colorectal Father      Hypertension Father      Colon Cancer Father      Other Cancer Sister      CANCER Maternal Aunt      breast     CANCER Other      breast in cousin     Other Cancer Maternal Grandfather      Breast Cancer Paternal  "Grandmother      Other Cancer Paternal Grandfather          PHYSICAL EXAM:  Vital signs:  /73 (BP Location: Right arm)  Pulse 72  Temp 97.2  F (36.2  C) (Oral)  Ht 1.676 m (5' 6\")  Wt 58.8 kg (129 lb 9.6 oz)  LMP 2006  BMI 20.92 kg/m2   ECO  GENERAL/CONSTITUTIONAL: No acute distress.  EYES: No scleral icterus.  LYMPH: No anterior cervical, posterior cervical, supraclavicular, or axillary adenopathy.   RESPIRATORY: Clear to auscultation bilaterally. No crackles or wheezing.   CARDIOVASCULAR: Regular rate and rhythm without murmurs, gallops, or rubs.  GASTROINTESTINAL: No tenderness. The patient has normal bowel sounds. No guarding.  No distention.  BREAST: s/p bilateral mastectomy with implants in place.  No palpable chest wall masses.  MUSCULOSKELETAL: Warm and well-perfused. Lymphedema of left arm.  NEUROLOGIC: Alert, oriented, answers questions appropriately.  INTEGUMENTARY: No jaundice.  Numerous scattered moles.    GAIT: Steady, does not use assistive device  Port in place at right upper chest.          LABS:  CBC RESULTS:   Recent Labs   Lab Test  17   0825   WBC  3.5*   RBC  4.37   HGB  13.7   HCT  39.8   MCV  91   MCH  31.4   MCHC  34.4   RDW  13.3   PLT  175         IMAGING:  DEXA scan 3/15/17:  FINDINGS:   Lumbar spine: The T-score is -1.3 from L1 to L4.      Hips: The left hip femoral neck T-score is -2.1. The right hip femoral  neck T-score is -2.1. Bone mineral density in the worst hip is 0.741  gm/cm2.          IMPRESSION:  1. Advanced osteopenia of both hips with mild osteopenia of the lumbar  spine.  2. The probability of major osteoporotic fracture is 14.8 percent and  probability of hip fracture is 2.4 percent within the next 10 years  according to FRAX risk assessment.      ASSESSMENT/PLAN:  Kita Smith is a 56 year old female:    1) Left breast cancer: diagnosed in , s/p bilateral mastectomy and chemotherapy and hormone therapy.  She is known to be BRCA-2 " positive and has undergone hysterectomy and bilateral salpingo-oophorectomy on 04/06/2013 for prophylaxis of ovarian and uterine cancer.  Now with recurrence, s/p left lateral breast lesion excision and left axillary dissection on 6/27/16.  Pathology showed invasive ductal carcinoma, grade 3, tumor size 1.5 cm +LVI, 6 of 31 lymph nodes were involved, ER positive (75%), [place] positive (5%), HER-2 negative.  4 of 29 lymph node were positive in the axillary dissection.  There was excision of a second breast nodule that also had metastatic breast cancer.      She has completed chemotherapy and radiation.  She started anastrozole on 3/7/17.    -continue anastrozole  -patient wants annual PET scans.  I discussed that this is not the typical standard of care, and poses risk such as additional anxiety, excess radiation, and non-specific findings that could prompt unrevealing biopsies.  Patient insists on it.  With her recurrent cancer and high-risk BRCA, it would be reasonable to obtain imaging.    -will obtain PET-CT in 3 months  -patient okay with no labs or tumor markers though, since that was normal throughout her recurrence and is likely not useful  -RTC in 3 months    2) Left arm and back pain: started after surgery, and worsened on chemotherapy and radiation.  It is improving on physical therapy and time.  -on Tramadol 50 mg Q6H prn   -continue lymphedema therapy  -continue physical therapy    3) Lymphedema:  -she is undergoing lymphedema therapy    4) Melanoma: She has history of malignant melanoma x 2, stage IA of the right neck and stage 0 of the left temple.  She now sees another dermatologist, Dr. Mak Bolden, and underwent shave biopsies of right lateral neck, atypical nevi of the left posterior arm and left index finger.  She saw Dr. Sierra at [facility], but will not continue to follow her for surveillance.    -she follow with dermatologist close to her home    5) Pulmonary nodules: were found in  "2010 that were thought granulomatous and PET negative at the time, and have been stable on subsequent scans.     6) Reflux:  -she tried omeprazole     7) Osteopenia: osteopenia of both hips and mild osteopenia of the lumbar spine  -take calcium and vitamin D  -repeat DEXA scan in March 2019    8) Leukopenia: May be from chemotherapy and radiation.  It is improving.  -will repeat CBC in 3 months when she returns    9) Psychosocial: I suggested referral to our cancer psychologist, but he does not come to [facility].  Patient does not want to travel elsewhere, so she declined.        I spent a total of 40 minutes with the patient, with over >50% of the time in counseling and/or coordination of care.      Preeti Jasso MD  Hematology/Oncology  [facility]          Kita Smith is a 56 year old female who presents for:  Chief Complaint   Patient presents with     Oncology Clinic Visit     Breast Cancer 6 week F/U        Initial Vitals:  /73 (BP Location: Right arm)  Pulse 72  Temp 97.2  F (36.2  C) (Oral)  Ht 1.676 m (5' 6\")  Wt 58.8 kg (129 lb 9.6 oz)  LMP 12/01/2006  BMI 20.92 kg/m2 Estimated body mass index is 20.92 kg/(m^2) as calculated from the following:    Height as of this encounter: 1.676 m (5' 6\").    Weight as of this encounter: 58.8 kg (129 lb 9.6 oz).. Body surface area is 1.65 meters squared. BP completed using cuff size: regular  Severe Pain (7) Patient's last menstrual period was 12/01/2006. Allergies and medications reviewed.     Medications: Medication refills not needed today.  Pharmacy name entered into [employer]:    [id number] - [place], [place] - [address] AT [facility] [address]  [facility] - [place], [place] - [address]    Comments: Labs drawn by PHILIPPE Blanco 21 gauge BF without difficulty  10  minutes for nursing intake (face to face time)   PHILIPPE Woo MD  "

## 2017-04-21 NOTE — PATIENT INSTRUCTIONS
-continue anastrozole  -continue calcium and vitamin D  -schedule PET-CT in 3 months  *Scheduled 7/17/17  -return to clinic in 3 months with lab (CBC)      Return appt scheduled for 7/21/17  AVS printed & given to patient/Sofia

## 2017-04-21 NOTE — PROGRESS NOTES
[facility]    Hematology/Oncology Established Patient Follow-up Note      Today's Date: 04/21/2017    Reason for Follow-up: history of breast cancer and melanoma    HISTORY OF PRESENT ILLNESS: Kita Smith is a 56 year old female who is being followed for adenocarcinoma of the breast as well as melanoma. She was previously followed by Dr. Minor.  Kita is known to be BRCA-2 positive. She has undergone bilateral mastectomy.  She has also undergone hysterectomy and bilateral salpingo-oophorectomy on 04/06/2013 for prophylaxis of ovarian and uterine cancer.     She first presented with multifocal lesions in the left breast in 2006. She underwent left mastectomy on 01/28/2006 demonstrating multifocal infiltrating ductal adenocarcinoma, grade 2, with DCIS. She had 3 separate lesions measuring 1.5 cm, 0.5 cm, and 0.4 cm. All lymph nodes were negative. The tumor was ER/WA positive and HER-2 negative. She received 4 cycles of dose-dense Adriamycin and Cytoxan and was then on tamoxifen through 2012 when it was discontinued.     In the summer of 2012, Kita noted a hyperpigmented lesion over the right neck which was rapidly growing and changing over 1-2 months. The lesion was excised by Dr. Nataliia Baron. The lesion was a Santiago level III melanoma Breslow depth 0.5 mm.   Stage IA.  A wide excision was subsequently performed by Dr. Nataliia Baron, with 1 cm margins and no evidence of residual disease. The patient was subsequently referred to Dr. Childers. A PET-CT showed no evidence of metastatic disease. 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 Bazan of [facility] in March 2016.  This was stage 0.    On a CT scan study done on 10/12/2010 she was noted to have multiple bilateral pulmonary nodules, which were felt to be granulomatous. These have been followed; these were PET negative. Her most recent CT of the chest  done 04/05/2013 showed that the tiny pulmonary nodules were stable over 3 years and therefore these are no longer followed on a routine basis.      PET-CT in June 2016 showed left axillary lymphadenopathy and biopsy of lymph node showed metastatic breast carcinoma, consistent with recurrence.  On 6/27/16, she underwent left lateral breast lesion excision and left axillary dissection.  Pathology showed invasive ductal carcinoma, grade 3, tumor size 1.5 cm +LVI, 6 of 31 lymph nodes were involved, ER positive (75%), NM positive (5%), HER-2 negative.  4 of 29 lymph node were positive in the axillary dissection.  There was excision of a second breast nodule that also had metastatic breast cancer.      She began chemotherapy on 8/11/16, and completed docetaxel+carboplatin x 6 cycles on 11/25/16.  She completed radiation in late February 2017.      Port was removed on 3/16/17.    She started anastrozole on 3/16/17.      INTERIM HISTORY:  Kita comes in for follow-up today.   She has been under much stress with her daughter, who ran away last night.  She has been a lot of psychological issues.  She says that she has a mild headache after taking the anastrozole.  Her left arm numbness is improving, and physical therapy has been helping.  She expresses a lot of anxiety over possibility of her cancer returning.        REVIEW OF SYSTEMS:   14 point ROS was reviewed and is negative other than as noted above in HPI.       HOME MEDICATIONS:  Current Outpatient Prescriptions   Medication Sig Dispense Refill     Calcium Carb-Cholecalciferol (CALCIUM + D3 PO)        anastrozole (ARIMIDEX) 1 MG tablet Take 1 tablet (1 mg) by mouth daily 90 tablet 3     traMADol (ULTRAM) 50 MG tablet Take 1 tablet (50 mg) by mouth every 6 hours as needed for pain (maximum 8 tablets per day) 60 tablet 1     B Complex-C TABS        coenzyme Q-10 10 MG CAPS Take 200 mg by mouth       vitamin  s/Minerals TABS        zolpidem (AMBIEN) 10 MG tablet Take 10  mg by mouth       LORazepam (ATIVAN) 0.5 MG tablet Take 1 tablet (0.5 mg) by mouth every 4 hours as needed (Anxiety, Nausea/Vomiting or Sleep) 30 tablet 2     olopatadine (PATANOL) 0.1 % ophthalmic solution Place 1 drop into both eyes 2 times daily as needed for allergies       amitriptyline (ELAVIL) 10 MG tablet Take 10-20 mg by mouth Reported on 2017       calcium carbonate (TUMS) 500 MG chewable tablet Take 1 chew tab by mouth daily Reported on 2017       omeprazole (PRILOSEC) 40 MG capsule Take 1 capsule (40 mg) by mouth daily Take 30-60 minutes before a meal. (Patient not taking: Reported on 2017) 90 capsule 3     ondansetron (ZOFRAN) 8 MG tablet Take 1 tablet (8 mg) by mouth every 8 hours as needed for nausea (Patient not taking: Reported on 2017) 30 tablet 3     Ondansetron (ZOFRAN ODT PO) Take 4 mg by mouth Reported on 2017           ALLERGIES:  Allergies   Allergen Reactions     Percocet [Oxycodone-Acetaminophen] Nausea     Adhesive Tape Blisters     REDNESS,  bandaides     Hydrocodone Nausea and Vomiting     Latex      Wound Dressing Adhesive          PAST MEDICAL HISTORY:  Past Medical History:   Diagnosis Date     Basal cell carcinoma      Breast CA (H)      Depression with anxiety      Histoplasmosis      Malignant melanoma (H)      PONV (postoperative nausea and vomiting)          PAST SURGICAL HISTORY:  Past Surgical History:   Procedure Laterality Date     BIOPSY OF SKIN LESION       BREAST SURGERY      bilat mastectomy       C RAD RESEC TONSIL/PILLARS        SECTION      times 2     CL AFF SURGICAL PATHOLOGY      left wrist     DAVINCI HYSTERECTOMY TOTAL, BILATERAL SALPINGO-OOPHORECTOMY, COMBINED  2013    Procedure: COMBINED DAVINCI HYSTERECTOMY TOTAL, SALPINGO-OOPHORECTOMY;  ROBOTIC ASSISTED TOTAL LAPAROSCOPIC HYSTERECTOMY, BILATERAL SALPINGO OOPHORECTOMY, PARTIAL VULVECTOMY ;  Surgeon: Hira Jenkins MD;  Location: [facility]     DISSECT LYMPH NODE  AXILLA Left 6/27/2016    Procedure: DISSECT LYMPH NODE AXILLA;  Surgeon: Hebert Driscoll MD;  Location:  OR     GENERAL SURGERY                           DATE:  1996 &2000    C section     HC REVISE BREAST RECONSTRUCTION       HEMORRHOIDECTOMY  1983     INSERT PORT VASCULAR ACCESS Right 6/27/2016    Procedure: INSERT PORT VASCULAR ACCESS;  Surgeon: Hebert Driscoll MD;  Location:  OR     MASTECTOMY       melanoma removal[      right sided neck     MOHS MICROGRAPHIC PROCEDURE       PROCTOPLASTY  4/13/2013    Procedure: PROCTOPLASTY;  PROCTOPLASTY TO REPAIR ANAL FISSURE AND STENOSIS;  Surgeon: Goldberg, Stanley Morton, MD;  Location: [facility]     REMOVE PORT VASCULAR ACCESS N/A 3/16/2017    Procedure: REMOVE PORT VASCULAR ACCESS;  Surgeon: Hebert Driscoll MD;  Location: [facility]     VULVECTOMY SIMPLE  4/26/2013    Procedure: VULVECTOMY SIMPLE;;  Surgeon: Hira Jenkins MD;  Location: [facility]         SOCIAL HISTORY:  Social History     Social History     Marital status: Single     Spouse name: N/A     Number of children: N/A     Years of education: N/A     Occupational History     Not on file.     Social History Main Topics     Smoking status: Former Smoker     Packs/day: 1.50     Years: 4.00     Types: Cigarettes     Quit date: 1/11/1980     Smokeless tobacco: Never Used     Alcohol use Yes      Comment: occasionally     Drug use: No     Sexual activity: Not on file     Other Topics Concern     Not on file     Social History Narrative         FAMILY HISTORY:  Family History   Problem Relation Age of Onset     CANCER Mother      brain     Other Cancer Mother      Cancer - colorectal Father      Hypertension Father      Colon Cancer Father      Other Cancer Sister      CANCER Maternal Aunt      breast     CANCER Other      breast in cousin     Other Cancer Maternal Grandfather      Breast Cancer Paternal Grandmother      Other Cancer Paternal Grandfather          PHYSICAL EXAM:  Vital signs:  BP  "107/73 (BP Location: Right arm)  Pulse 72  Temp 97.2  F (36.2  C) (Oral)  Ht 1.676 m (5' 6\")  Wt 58.8 kg (129 lb 9.6 oz)  LMP 2006  BMI 20.92 kg/m2   ECO  GENERAL/CONSTITUTIONAL: No acute distress.  EYES: No scleral icterus.  LYMPH: No anterior cervical, posterior cervical, supraclavicular, or axillary adenopathy.   RESPIRATORY: Clear to auscultation bilaterally. No crackles or wheezing.   CARDIOVASCULAR: Regular rate and rhythm without murmurs, gallops, or rubs.  GASTROINTESTINAL: No tenderness. The patient has normal bowel sounds. No guarding.  No distention.  BREAST: s/p bilateral mastectomy with implants in place.  No palpable chest wall masses.  MUSCULOSKELETAL: Warm and well-perfused. Lymphedema of left arm.  NEUROLOGIC: Alert, oriented, answers questions appropriately.  INTEGUMENTARY: No jaundice.  Numerous scattered moles.    GAIT: Steady, does not use assistive device  Port in place at right upper chest.          LABS:  CBC RESULTS:   Recent Labs   Lab Test  17   0825   WBC  3.5*   RBC  4.37   HGB  13.7   HCT  39.8   MCV  91   MCH  31.4   MCHC  34.4   RDW  13.3   PLT  175         IMAGING:  DEXA scan 3/15/17:  FINDINGS:   Lumbar spine: The T-score is -1.3 from L1 to L4.      Hips: The left hip femoral neck T-score is -2.1. The right hip femoral  neck T-score is -2.1. Bone mineral density in the worst hip is 0.741  gm/cm2.          IMPRESSION:  1. Advanced osteopenia of both hips with mild osteopenia of the lumbar  spine.  2. The probability of major osteoporotic fracture is 14.8 percent and  probability of hip fracture is 2.4 percent within the next 10 years  according to FRAX risk assessment.      ASSESSMENT/PLAN:  Kita Smith is a 56 year old female:    1) Left breast cancer: diagnosed in , s/p bilateral mastectomy and chemotherapy and hormone therapy.  She is known to be BRCA-2 positive and has undergone hysterectomy and bilateral salpingo-oophorectomy on 2013 for " prophylaxis of ovarian and uterine cancer.  Now with recurrence, s/p left lateral breast lesion excision and left axillary dissection on 6/27/16.  Pathology showed invasive ductal carcinoma, grade 3, tumor size 1.5 cm +LVI, 6 of 31 lymph nodes were involved, ER positive (75%), NH positive (5%), HER-2 negative.  4 of 29 lymph node were positive in the axillary dissection.  There was excision of a second breast nodule that also had metastatic breast cancer.      She has completed chemotherapy and radiation.  She started anastrozole on 3/7/17.    -continue anastrozole  -patient wants annual PET scans.  I discussed that this is not the typical standard of care, and poses risk such as additional anxiety, excess radiation, and non-specific findings that could prompt unrevealing biopsies.  Patient insists on it.  With her recurrent cancer and high-risk BRCA, it would be reasonable to obtain imaging.    -will obtain PET-CT in 3 months  -patient okay with no labs or tumor markers though, since that was normal throughout her recurrence and is likely not useful  -RTC in 3 months    2) Left arm and back pain: started after surgery, and worsened on chemotherapy and radiation.  It is improving on physical therapy and time.  -on Tramadol 50 mg Q6H prn   -continue lymphedema therapy  -continue physical therapy    3) Lymphedema:  -she is undergoing lymphedema therapy    4) Melanoma: She has history of malignant melanoma x 2, stage IA of the right neck and stage 0 of the left temple.  She now sees another dermatologist, Dr. Mak Bolden, and underwent shave biopsies of right lateral neck, atypical nevi of the left posterior arm and left index finger.  She saw Dr. Sierra at [facility], but will not continue to follow her for surveillance.    -she follow with dermatologist close to her home    5) Pulmonary nodules: were found in 2010 that were thought granulomatous and PET negative at the time, and have been stable on  subsequent scans.     6) Reflux:  -she tried omeprazole     7) Osteopenia: osteopenia of both hips and mild osteopenia of the lumbar spine  -take calcium and vitamin D  -repeat DEXA scan in March 2019    8) Leukopenia: May be from chemotherapy and radiation.  It is improving.  -will repeat CBC in 3 months when she returns    9) Psychosocial: I suggested referral to our cancer psychologist, but he does not come to [facility].  Patient does not want to travel elsewhere, so she declined.        I spent a total of 40 minutes with the patient, with over >50% of the time in counseling and/or coordination of care.      Preeti Jasso MD  Hematology/Oncology  [facility]

## 2017-04-21 NOTE — MR AVS SNAPSHOT
After Visit Summary   4/21/2017    Kita Smith    MRN: [id number]           Patient Information     Date Of Birth          1960        Visit Information        Provider Department      4/21/2017 8:30 AM Preeti Jasso MD [facility]        Today's Diagnoses     Malignant neoplasm of overlapping sites of left female breast (H)          Care Instructions    -continue anastrozole  -continue calcium and vitamin D  -schedule PET-CT in 3 months  -return to clinic in 3 months with lab (CBC)        Follow-ups after your visit        Your next 10 appointments already scheduled     Apr 24, 2017  3:15 PM CDT   Lymphedema Treatment with Tamar Elias, OT   [facility] ([facility])    [address]  [address]  [place] [address]   [phone]            May 10, 2017  3:15 PM CDT   Treatment 45 with Kaylin Bolanos, PT   [facility] ([facility])    [address]  [address]  [facility] [address]   [phone]            May 22, 2017  3:15 PM CDT   Lymphedema Treatment with Tamar Elias, OT   [facility] ([facility])    [address]  [address]  [facility] [address]   [phone]            May 24, 2017  3:15 PM CDT   Treatment 45 with Kaylin Bolanos, PT   [facility] ([facility])    [address]  [address]  [facility] [address]   [phone]            Jun 05, 2017  3:15 PM CDT   Treatment 45 with Kaylin Bolanos, PT   [facility] ([facility])    [address]  [address]  [facility] [address]   [phone]            Jun 19, 2017  3:15 PM CDT   Lymphedema Treatment with Tamar Elias, OT   [facility] ([facility])    [address]  [address]  [place] [address]   [phone]            Jun 22, 2017  3:15 PM CDT   Treatment 45 with Kaylin Bolanos, PT   [place]  [facility] ([facility])    [address]  [address]  [facility] [address]   [phone]            Jul 17, 2017 10:00 AM CDT   PE NPET ONCOLOGY (EYES TO THIGHS) with [id number]   [facility] PET CT ([facility])    [address] [address]   [phone]           Tell your doctor:   If there is any chance you may be pregnant or if you are breastfeeding.   If you have problems lying in small spaces (claustrophobia). If you do, your doctor may give you medicine to help you relax. If you have diabetes:   Have your exam early in the morning. Your blood glucose will go up as the day goes by.   Your glucose level must be 180 or less at the start of the exam. Please take any medicines you need to ensure this blood glucose level. 24 hours before your scan: Don t do any heavy exercise. (No jogging, aerobics or other workouts.) Exercise will make your pictures less accurate. 6 hours before your scan:   Stop all food and liquids (except water).   Do not chew gum or suck on mints.   If you need to take medicine with food, you may take it with a few crackers.  Please call your Imaging Department at your exam site with any questions.            Jul 17, 2017  3:15 PM CDT   Lymphedema Treatment with Tamar Elias OT   [facility] ([facility])    [address]  [facility] [address]   [phone]            Jul 21, 2017  8:00 AM CDT   Return Visit with  Oncology Nurse   [facility] ([facility])    [facility]  [address]  [facility] [address]   [phone]              Future tests that were ordered for you today     Open Future Orders        Priority Expected Expires Ordered    PET Oncology (Eyes to Thighs) Routine 7/21/2017 4/21/2018 4/21/2017    CBC with platelets differential Routine 7/21/2017 4/21/2018 4/21/2017            Who to contact     If you  "have questions or need follow up information about today's clinic visit or your schedule please contact [facility] directly at [phone].  Normal or non-critical lab and imaging results will be communicated to you by [employer], letter or phone within 4 business days after the clinic has received the results. If you do not hear from us within 7 days, please contact the clinic through [employer] or phone. If you have a critical or abnormal lab result, we will notify you by phone as soon as possible.  Submit refill requests through [employer] or call your pharmacy and they will forward the refill request to us. Please allow 3 business days for your refill to be completed.          Additional Information About Your Visit        GIGAS[employer]farmaciamarket Information     [employer] lets you send messages to your doctor, view your test results, renew your prescriptions, schedule appointments and more. To sign up, go to [email or web address] . Click on \"Log in\" on the left side of the screen, which will take you to the Welcome page. Then click on \"Sign up Now\" on the right side of the page.     You will be asked to enter the access code listed below, as well as some personal information. Please follow the directions to create your username and password.     Your access code is: [email or web address]  Expires: 2017  4:38 PM     Your access code will  in 90 days. If you need help or a new code, please call your [facility] clinic or [phone].        Care EveryWhere ID     This is your Care EveryWhere ID. This could be used by other organizations to access your [facility] medical records  [id number]        Your Vitals Were     Pulse Temperature Height Last Period BMI (Body Mass Index)       72 97.2  F (36.2  C) (Oral) 1.676 m (5' 6\") 2006 20.92 kg/m2        Blood Pressure from Last 3 Encounters:   17 107/73   17 118/87   17 (!) 138/95    Weight from Last 3 Encounters:   17 58.8 kg (129 lb 9.6 oz) "   03/16/17 59 kg (130 lb)   03/07/17 60.2 kg (132 lb 12.8 oz)              We Performed the Following     CBC with platelets differential        Primary Care Provider Office Phone # Fax #    Checo Crockett [phone]       [place] [address] [facility] [address] [place] [address] [facility] [address]        Thank you!     Thank you for choosing [facility]  for your care. Our goal is always to provide you with excellent care. Hearing back from our patients is one way we can continue to improve our services. Please take a few minutes to complete the written survey that you may receive in the mail after your visit with us. Thank you!             Your Updated Medication List - Protect others around you: Learn how to safely use, store and throw away your medicines at www.disposemymeds.org.          This list is accurate as of: 4/21/17  9:23 AM.  Always use your most recent med list.                   Brand Name Dispense Instructions for use    amitriptyline 10 MG tablet    ELAVIL     Take 10-20 mg by mouth Reported on 4/21/2017       anastrozole 1 MG tablet    ARIMIDEX    90 tablet    Take 1 tablet (1 mg) by mouth daily       B Complex-C Tabs          CALCIUM + D3 PO          calcium carbonate 500 MG chewable tablet    TUMS     Take 1 chew tab by mouth daily Reported on 4/21/2017       coenzyme Q-10 10 MG Caps      Take 200 mg by mouth       LORazepam 0.5 MG tablet    ATIVAN    30 tablet    Take 1 tablet (0.5 mg) by mouth every 4 hours as needed (Anxiety, Nausea/Vomiting or Sleep)       olopatadine 0.1 % ophthalmic solution    PATANOL     Place 1 drop into both eyes 2 times daily as needed for allergies       omeprazole 40 MG capsule    priLOSEC    90 capsule    Take 1 capsule (40 mg) by mouth daily Take 30-60 minutes before a meal.       ondansetron 8 MG tablet    ZOFRAN    30 tablet    Take 1 tablet (8 mg) by mouth every 8 hours as needed for nausea       traMADol 50 MG tablet    ULTRAM     60 tablet    Take 1 tablet (50 mg) by mouth every 6 hours as needed for pain (maximum 8 tablets per day)       vitamin  s/Minerals Tabs          ZOFRAN ODT PO      Take 4 mg by mouth Reported on 4/21/2017       zolpidem 10 MG tablet    AMBIEN     Take 10 mg by mouth

## 2017-04-21 NOTE — MR AVS SNAPSHOT
After Visit Summary   4/21/2017    Kita Smith    MRN: [id number]           Patient Information     Date Of Birth          1960        Visit Information        Provider Department      4/21/2017 8:00 AM Nurse,  Oncology [facility]        Today's Diagnoses     Malignant neoplasm of overlapping sites of left female breast (H)    -  1       Follow-ups after your visit        Your next 10 appointments already scheduled     Apr 24, 2017  3:15 PM CDT   Lymphedema Treatment with Tamar Elias, OT   [facility] L[facility]hedema OT ([facility])    [address]  [address]  [place] [address]   [phone]            May 10, 2017  3:15 PM CDT   Treatment 45 with Kaylin Bolanos, PT   [facility] ([facility])    [address]  [address]  [place] [address]   [phone]            May 22, 2017  3:15 PM CDT   Lymphedema Treatment with Tamar Elias, OT   [facility] ([facility])    [address]  [address]  [place] [address]   [phone]            May 24, 2017  3:15 PM CDT   Treatment 45 with Kaylin Bolanos, PT   [facility] ([facility])    [address]  [address]  [place] [address]   [phone]            Jun 05, 2017  3:15 PM CDT   Treatment 45 with Kaylin Bolanos, PT   [facility] ([facility])    [address]  [address]  [place] [address]   [phone]            Jun 19, 2017  3:15 PM CDT   Lymphedema Treatment with Tamar Elias, OT   [facility] ([facility])    [address]  [address]  [place] [address]   [phone]            Jun 22, 2017  3:15 PM CDT   Treatment 45 with Kaylin Bolanos, PT   [facility] ([facility])    [address]  [address]  [place] [address]   [phone]            Jul 17, 2017  3:15 PM CDT   Lymphedema  "Treatment with Tamar MONO Jada, OT   [facility] Lymphedema OT ([facility])    [address]  [address]  [place] [address] [phone]            Aug 14, 2017  3:15 PM CDT   Lymphedema Treatment with Tamar Elias, OT   [facility] Lymphedema OT ([facility])    [address]  [address]  [place] [address] [phone]              Who to contact     If you have questions or need follow up information about today's clinic visit or your schedule please contact [facility] directly at [phone].  Normal or non-critical lab and imaging results will be communicated to you by MyChart, letter or phone within 4 business days after the clinic has received the results. If you do not hear from us within 7 days, please contact the clinic through [employer] or phone. If you have a critical or abnormal lab result, we will notify you by phone as soon as possible.  Submit refill requests through [employer] or call your pharmacy and they will forward the refill request to us. Please allow 3 business days for your refill to be completed.          Additional Information About Your Visit        MyChart Information     [employer] lets you send messages to your doctor, view your test results, renew your prescriptions, schedule appointments and more. To sign up, go to [email or web address] . Click on \"Log in\" on the left side of the screen, which will take you to the Welcome page. Then click on \"Sign up Now\" on the right side of the page.     You will be asked to enter the access code listed below, as well as some personal information. Please follow the directions to create your username and password.     Your access code is: [email or web address]  Expires: 2017  4:38 PM     Your access code will  in 90 days. If you need help or a new code, please call your [place] clinic or [phone].        Care EveryWhere ID     This is your Care EveryWhere ID. This could be used by other " organizations to access your [facility] medical records  [id number]        Your Vitals Were     Last Period                   12/01/2006            Blood Pressure from Last 3 Encounters:   04/21/17 107/73   03/16/17 118/87   03/07/17 (!) 138/95    Weight from Last 3 Encounters:   04/21/17 58.8 kg (129 lb 9.6 oz)   03/16/17 59 kg (130 lb)   03/07/17 60.2 kg (132 lb 12.8 oz)              Today, you had the following     No orders found for display       Primary Care Provider Office Phone # Fax #    Checo Crockett [phone]       [address] [facility] [address]        Thank you!     Thank you for choosing [facility]  for your care. Our goal is always to provide you with excellent care. Hearing back from our patients is one way we can continue to improve our services. Please take a few minutes to complete the written survey that you may receive in the mail after your visit with us. Thank you!             Your Updated Medication List - Protect others around you: Learn how to safely use, store and throw away your medicines at www.disposemymeds.org.          This list is accurate as of: 4/21/17  8:55 AM.  Always use your most recent med list.                   Brand Name Dispense Instructions for use    amitriptyline 10 MG tablet    ELAVIL     Take 10-20 mg by mouth Reported on 4/21/2017       anastrozole 1 MG tablet    ARIMIDEX    90 tablet    Take 1 tablet (1 mg) by mouth daily       B Complex-C Tabs          CALCIUM + D3 PO          calcium carbonate 500 MG chewable tablet    TUMS     Take 1 chew tab by mouth daily Reported on 4/21/2017       coenzyme Q-10 10 MG Caps      Take 200 mg by mouth       LORazepam 0.5 MG tablet    ATIVAN    30 tablet    Take 1 tablet (0.5 mg) by mouth every 4 hours as needed (Anxiety, Nausea/Vomiting or Sleep)       olopatadine 0.1 % ophthalmic solution    PATANOL     Place 1 drop into both eyes 2 times daily as  needed for allergies       omeprazole 40 MG capsule    priLOSEC    90 capsule    Take 1 capsule (40 mg) by mouth daily Take 30-60 minutes before a meal.       ondansetron 8 MG tablet    ZOFRAN    30 tablet    Take 1 tablet (8 mg) by mouth every 8 hours as needed for nausea       traMADol 50 MG tablet    ULTRAM    60 tablet    Take 1 tablet (50 mg) by mouth every 6 hours as needed for pain (maximum 8 tablets per day)       vitamin  s/Minerals Tabs          ZOFRAN ODT PO      Take 4 mg by mouth Reported on 4/21/2017       zolpidem 10 MG tablet    AMBIEN     Take 10 mg by mouth

## 2017-04-25 ENCOUNTER — HOSPITAL ENCOUNTER (OUTPATIENT)
Dept: OCCUPATIONAL THERAPY | Facility: CLINIC | Age: 57
Setting detail: THERAPIES SERIES
End: 2017-04-25
Attending: FAMILY MEDICINE
Payer: COMMERCIAL

## 2017-04-25 PROCEDURE — 40000445 ZZHC STATISTIC OT VISIT, LYMPHEDEMA

## 2017-04-25 PROCEDURE — 97140 MANUAL THERAPY 1/> REGIONS: CPT | Mod: GO

## 2017-05-01 ENCOUNTER — TELEPHONE (OUTPATIENT)
Dept: ONCOLOGY | Facility: CLINIC | Age: 57
End: 2017-05-01

## 2017-05-01 DIAGNOSIS — C50.812 MALIGNANT NEOPLASM OF OVERLAPPING SITES OF LEFT FEMALE BREAST (H): ICD-10-CM

## 2017-05-01 RX ORDER — TRAMADOL HYDROCHLORIDE 50 MG/1
50 TABLET ORAL EVERY 6 HOURS PRN
Qty: 60 TABLET | Refills: 1 | COMMUNITY
Start: 2017-05-01 | End: 2017-12-01

## 2017-05-01 NOTE — TELEPHONE ENCOUNTER
Patient called stating that she needs a refill of her Tramadol. Stated to patient that writer will route message to Dr. Jasso for verification. Patient also stated that she has has diarrhea for the past week. She stated that she is eating a BRAT diet and taking Imodium. Recommended to patient that she continue her BRAT diet with probiotic yogurt and continue Imodium. Instructed patient to make sure that she is drinking plenty of fluids which she stated she is doing. Recommended to patient that she see her primary care physician if diarrhea continues. Kary Schultz RN

## 2017-05-01 NOTE — TELEPHONE ENCOUNTER
Patient called and left message with patient that Tramadol was refilled and called in to her [facility], ,[place]

## 2017-05-22 ENCOUNTER — HOSPITAL ENCOUNTER (OUTPATIENT)
Dept: OCCUPATIONAL THERAPY | Facility: CLINIC | Age: 57
Setting detail: THERAPIES SERIES
End: 2017-05-22
Attending: FAMILY MEDICINE
Payer: COMMERCIAL

## 2017-05-22 PROCEDURE — 97140 MANUAL THERAPY 1/> REGIONS: CPT | Mod: GO

## 2017-05-22 PROCEDURE — 40000445 ZZHC STATISTIC OT VISIT, LYMPHEDEMA

## 2017-06-21 ENCOUNTER — HOSPITAL ENCOUNTER (EMERGENCY)
Facility: CLINIC | Age: 57
Discharge: HOME | End: 2017-06-21
Attending: EMERGENCY MEDICINE | Admitting: EMERGENCY MEDICINE
Payer: COMMERCIAL

## 2017-06-21 VITALS
HEIGHT: 66 IN | SYSTOLIC BLOOD PRESSURE: 163 MMHG | OXYGEN SATURATION: 98 % | RESPIRATION RATE: 18 BRPM | DIASTOLIC BLOOD PRESSURE: 83 MMHG | TEMPERATURE: 98.3 F | HEART RATE: 68 BPM

## 2017-06-21 DIAGNOSIS — L98.9 SKIN LESION: ICD-10-CM

## 2017-06-21 PROCEDURE — 99283 EMERGENCY DEPT VISIT LOW MDM: CPT

## 2017-06-21 PROCEDURE — 25000132 ZZH RX MED GY IP 250 OP 250 PS 637: Performed by: EMERGENCY MEDICINE

## 2017-06-21 RX ORDER — CEPHALEXIN 500 MG/1
500 CAPSULE ORAL ONCE
Status: COMPLETED | OUTPATIENT
Start: 2017-06-21 | End: 2017-06-21

## 2017-06-21 RX ORDER — CEPHALEXIN 500 MG/1
500 CAPSULE ORAL 3 TIMES DAILY
Qty: 21 CAPSULE | Refills: 0 | Status: SHIPPED | OUTPATIENT
Start: 2017-06-21 | End: 2017-06-28

## 2017-06-21 RX ADMIN — CEPHALEXIN 500 MG: 500 CAPSULE ORAL at 22:49

## 2017-06-21 ASSESSMENT — ENCOUNTER SYMPTOMS
NAUSEA: 1
WOUND: 1

## 2017-06-21 NOTE — ED AVS SNAPSHOT
Emergency Department    [address] [facility] [address]    Phone:  [phone]    Fax:  [phone]                                       Kita Smith   MRN: [id number]    Department:   Emergency Department   Date of Visit:  6/21/2017           After Visit Summary Signature Page     I have received my discharge instructions, and my questions have been answered. I have discussed any challenges I see with this plan with the nurse or doctor.    ..........................................................................................................................................  Patient/Patient Representative Signature      ..........................................................................................................................................  Patient Representative Print Name and Relationship to Patient    ..................................................               ................................................  Date                                            Time    ..........................................................................................................................................  Reviewed by Signature/Title    ...................................................              ..............................................  Date                                                            Time

## 2017-06-21 NOTE — ED AVS SNAPSHOT
Emergency Department    [address] [facility] [address]    Phone:  [phone]    Fax:  [phone]                                       Kita Smith   MRN: [id number]    Department:   Emergency Department   Date of Visit:  6/21/2017           Patient Information     Date Of Birth          1960        Your diagnoses for this visit were:     Skin lesion        You were seen by Maxim Deng MD.      Follow-up Information     Follow up with Checo Crockett MD In 1 day.    Specialty:  Family Practice    Why:  If symptoms worsen    Contact information:    [address] [facility] [address]  [address] [facility] [address]  [phone]          Discharge Instructions         Discharge Instructions for Cellulitis  You have been diagnosed with cellulitis. This is an infection in the deepest layer of the skin. In some cases, the infection also affects the muscle. Cellulitis is caused by bacteria. The bacteria can enter the body through broken skin. This can happen with a cut, scratch, animal bite, or an insect bite that has been scratched. You may have been treated in the hospital with antibiotics and fluids. You will likely be given a prescription for antibiotics to take at home. This sheet will help you take care of yourself at home.  Home care  When you are home:    Take the prescribed antibiotic medicine you are given as directed until it is gone. Take it even if you feel better. It treats the infection and stops it from returning. Not taking all the medicine can make future infections hard to treat.    Keep the infected area clean.    When possible, raise the infected area above the level of your heart. This helps keep swelling down.    Talk with your healthcare provider if you are in pain. Ask what kind of over-the-counter medicine you can take for pain.    Apply clean bandages as advised.    Take your temperature once a day for a week.    Wash your hands often to  prevent spreading the infection.  In the future, wash your hands before and after you touch cuts, scratches, or bandages. This will help prevent infection.   When to call your healthcare provider  Call your healthcare provider immediately if you have any of the following:    Difficulty or pain when moving the joints above or below the infected area    Discharge or pus draining from the area    Fever of 100.4 F (38 C) or higher, or as directed by your healthcare provider    Pain that gets worse in or around the infected     Redness that gets worse in or around the infected area, particularly if the area of redness expands to a wider area    Shaking chills    Swelling of the infected area    Vomiting   Date Last Reviewed: 8/1/2016 2000-2017 [employer]. [address] [place] [address], [place] [address]. All rights reserved. This information is not intended as a substitute for professional medical care. Always follow your healthcare professional's instructions.          Future Appointments        Provider Department Dept Phone Center    7/17/2017 10:00 AM [facility] PET ROOM 1 [facility] PET [phone] [facility]    7/21/2017 8:00 AM Oncology Nurse [facility] [phone] [facility]    7/21/2017 8:30 AM Preeti Jasso MD [facility] [place] [address] [phone] [facility]      24 Hour Appointment Hotline       To make an appointment at any [facility], call [phone] ([phone]). If you don't have a family doctor or clinic, we will help you find one. [facility] are conveniently located to serve the needs of you and your family.             Review of your medicines      START taking        Dose / Directions Last dose taken    cephALEXin 500 MG capsule   Commonly known as:  KEFLEX   Dose:  500 mg   Quantity:  21 capsule        Take 1 capsule (500 mg) by mouth 3 times daily for 7 days   Refills:  0          Our records show that you are taking the  medicines listed below. If these are incorrect, please call your family doctor or clinic.        Dose / Directions Last dose taken    amitriptyline 10 MG tablet   Commonly known as:  ELAVIL   Dose:  10-20 mg        Take 10-20 mg by mouth Reported on 4/21/2017   Refills:  0        anastrozole 1 MG tablet   Commonly known as:  ARIMIDEX   Dose:  1 mg   Quantity:  90 tablet        Take 1 tablet (1 mg) by mouth daily   Refills:  3        B Complex-C Tabs        Refills:  0        CALCIUM + D3 PO        Refills:  0        calcium carbonate 500 MG chewable tablet   Commonly known as:  TUMS   Dose:  1 chew tab        Take 1 chew tab by mouth daily Reported on 4/21/2017   Refills:  0        coenzyme Q-10 10 MG Caps   Dose:  200 mg        Take 200 mg by mouth   Refills:  0        LORazepam 0.5 MG tablet   Commonly known as:  ATIVAN   Dose:  0.5 mg   Quantity:  30 tablet        Take 1 tablet (0.5 mg) by mouth every 4 hours as needed (Anxiety, Nausea/Vomiting or Sleep)   Refills:  2        olopatadine 0.1 % ophthalmic solution   Commonly known as:  PATANOL   Dose:  1 drop        Place 1 drop into both eyes 2 times daily as needed for allergies   Refills:  0        omeprazole 40 MG capsule   Commonly known as:  priLOSEC   Dose:  40 mg   Quantity:  90 capsule        Take 1 capsule (40 mg) by mouth daily Take 30-60 minutes before a meal.   Refills:  3        ondansetron 8 MG tablet   Commonly known as:  ZOFRAN   Dose:  8 mg   Quantity:  30 tablet        Take 1 tablet (8 mg) by mouth every 8 hours as needed for nausea   Refills:  3        traMADol 50 MG tablet   Commonly known as:  ULTRAM   Dose:  50 mg   Quantity:  60 tablet        Take 1 tablet (50 mg) by mouth every 6 hours as needed for pain (maximum 8 tablets per day)   Refills:  1        vitamin  s/Minerals Tabs        Refills:  0        ZOFRAN ODT PO   Dose:  4 mg   Indication:  Historical--Rx from  (PCP)        Take 4 mg by mouth Reported on 4/21/2017   Refills:   0        zolpidem 10 MG tablet   Commonly known as:  AMBIEN   Dose:  10 mg        Take 10 mg by mouth   Refills:  0                Prescriptions were sent or printed at these locations (1 Prescription)                   Other Prescriptions                Printed at Department/Unit printer (1 of 1)         cephALEXin (KEFLEX) 500 MG capsule                Orders Needing Specimen Collection     None      Pending Results     No orders found from 6/19/2017 to 6/22/2017.            Pending Culture Results     No orders found from 6/19/2017 to 6/22/2017.            Pending Results Instructions     If you had any lab results that were not finalized at the time of your Discharge, you can call the ED Lab Result RN at [phone]. You will be contacted by this team for any positive Lab results or changes in treatment. The nurses are available 7 days a week from 10A to 6:30P.  You can leave a message 24 hours per day and they will return your call.        Test Results From Your Hospital Stay               Clinical Quality Measure: Blood Pressure Screening     Your blood pressure was checked while you were in the emergency department today. The last reading we obtained was  BP: 163/83 . Please read the guidelines below about what these numbers mean and what you should do about them.  If your systolic blood pressure (the top number) is less than 120 and your diastolic blood pressure (the bottom number) is less than 80, then your blood pressure is normal. There is nothing more that you need to do about it.  If your systolic blood pressure (the top number) is 120-139 or your diastolic blood pressure (the bottom number) is 80-89, your blood pressure may be higher than it should be. You should have your blood pressure rechecked within a year by a primary care provider.  If your systolic blood pressure (the top number) is 140 or greater or your diastolic blood pressure (the bottom number) is 90 or greater, you may have high blood  "pressure. High blood pressure is treatable, but if left untreated over time it can put you at risk for heart attack, stroke, or kidney failure. You should have your blood pressure rechecked by a primary care provider within the next 4 weeks.  If your provider in the emergency department today gave you specific instructions to follow-up with your doctor or provider even sooner than that, you should follow that instruction and not wait for up to 4 weeks for your follow-up visit.        Thank you for choosing [facility]       Thank you for choosing [facility] for your care. Our goal is always to provide you with excellent care. Hearing back from our patients is one way we can continue to improve our services. Please take a few minutes to complete the written survey that you may receive in the mail after you visit with us. Thank you!        [employer] Information     [email or web address] lets you send messages to your doctor, view your test results, renew your prescriptions, schedule appointments and more. To sign up, go to [email or web address] . Click on \"Log in\" on the left side of the screen, which will take you to the Welcome page. Then click on \"Sign up Now\" on the right side of the page.     You will be asked to enter the access code listed below, as well as some personal information. Please follow the directions to create your username and password.     Your access code is: [email or web address]  Expires: 2017 10:47 PM     Your access code will  in 90 days. If you need help or a new code, please call your [facility] clinic or [phone].        Care EveryWhere ID     This is your Care EveryWhere ID. This could be used by other organizations to access your [facility] medical records  [id number]        Equal Access to Services     STEPHANIE VILLEGAS : nereyda Beckwith qaybta kaalmada adeegyada, waxay idiin hayaan adeeg kharash la'aan ah. So [facility] [phone].    ATENCIÓN: Si habla español, tiene a jasso " disposición servicios gratuitos de asistencia lingüística. Llgermania al [phone].    We comply with applicable federal civil rights laws and [place] laws. We do not discriminate on the basis of race, color, national origin, age, disability sex, sexual orientation or gender identity.            After Visit Summary       This is your record. Keep this with you and show to your community pharmacist(s) and doctor(s) at your next visit.

## 2017-06-22 NOTE — ED PROVIDER NOTES
History     Chief Complaint:  Left arm lump     HPI   Kita Smith is a 56 year old female with history of BRCA-2-positive breast cancer, s/p bilateral mastectomy and left axillar LN resection, who presents with left arm bump. Patient also has a history of melanoma and BCC, s/p Mohs procedure. She underwent a 2nd surgery for breast cancer, with left-sided resection of 33 LN's in February.  She has left arm lymphedema and sees a specialist twice a month. Today she noticed a small erythematous 1 cm lump on her left antecubital fossa, that is not painful. She has not noticed any other lumps in her left axilla or elsewhere. No spontaneous drainage. She also complains of some nausea today, that began before the noticed the lump. No fever. She is concerned for infection since she had a post-operative infection after the LN removal procedure.     Allergies:  Percocet (GI)  Hydrocodone (GI)  Latex    Medications:    Tramadol  Calcium carbonate - Vit D  Anastrozole  Amitriptyline   Zolpidem  Tums  Omeprazole  Lorazepam  Ondansetron  MVI    Past Medical History:    BCC  Breast cancer, left, BRCA 2 positive   Malignant melanoma  PONV  Histoplasmosis  Depression with anxiety  Acid reflux     Past Surgical History:    Skin biopsy  Bilateral mastectomy 2006  Tonsillectomy   x 2  VANESSA with bilateral salpingo-oophorectomy - laparoscopic   Left axillar LN dissection 2016  Bilateral breast reconstruction  Hemorrhoidectomy  Port-cath insertion, right  Mohs procedure  Proctoplasty   Simple vulvectomy     Family History:    Brain cancer  Colorectal cancer  HTN  Breast cancer   Other cancer     Social History:  Marital Status:  Single [1]  Smoking status: former, 1.5 PPD, quit   Alcohol status: occasional   Patient presents alone.  PCP: Checo Crockett      Review of Systems   Gastrointestinal: Positive for nausea.   Skin: Positive for wound (erythematous lump).   All other systems reviewed and are  "negative.      Physical Exam   First Vitals:  BP: 163/83  Heart Rate: 70  Temp: 98.3  F (36.8  C)  Height: 167.6 cm (5' 6\")  SpO2: 97 %      Physical Exam  General: No distress.   Head: No signs of trauma.   Mouth/Throat: Oropharynx moist.   Eyes: Conjunctivae are normal. Pupils are equal..   Neck: Normal range of motion.    Resp:No respiratory distress.   MSK: Normal range of motion. No obvious deformity.  Neuro: The patient is alert and interactive. MARIEE. Speech normal. GCS 15  Skin: 3 mm pustule with surrounding erythema in the left antecubital fossa.   Psych: normal mood and affect. behavior is normal.     Emergency Department Course     Interventions:  2249: Cephalexin 500 mg, PO      Emergency Department Course:  Nursing notes and vitals reviewed.  I performed an exam of the patient as documented above.     I discussed the findings and treatment plan with the patient. She expressed understanding of this plan and consented to discharge. She will be discharged home with instructions for care and follow up. In addition, the patient will return to the emergency department if their symptoms persist, worsen, if new symptoms arise or if there is any concern.  All questions were answered.      Impression & Plan      Medical Decision Making:  This patient is brought into the ED with complaints of a lesion on her left arm. This is in the antecubital fossa, it is consistent with a small papule, likely blocked sweat gland or hair follicle. It has some slight surrounding erythema and swelling, it may be a localized infection. She is at risk for development of cellulitis because of her previous lymph node resection. We will start her on keflex and follow-up closely with her PCP.     Diagnosis:    ICD-10-CM   1. Skin lesion L98.9     Disposition:   Discharge     Discharge Medication List as of 6/21/2017 10:47 PM      START taking these medications    Details   cephALEXin (KEFLEX) 500 MG capsule Take 1 capsule (500 mg) by " mouth 3 times daily for 7 days, Disp-21 capsule, R-0, Local Print          Scribe Disclosure:  I, Maddy Bone, am serving as a scribe at 10:14 PM on 6/21/2017 to document services personally performed by Maxim Deng MD, based on my observations and the provider's statements to me.    EMERGENCY DEPARTMENT       Maxim Deng MD  06/23/17 0512

## 2017-06-22 NOTE — DISCHARGE INSTRUCTIONS
Discharge Instructions for Cellulitis  You have been diagnosed with cellulitis. This is an infection in the deepest layer of the skin. In some cases, the infection also affects the muscle. Cellulitis is caused by bacteria. The bacteria can enter the body through broken skin. This can happen with a cut, scratch, animal bite, or an insect bite that has been scratched. You may have been treated in the hospital with antibiotics and fluids. You will likely be given a prescription for antibiotics to take at home. This sheet will help you take care of yourself at home.  Home care  When you are home:    Take the prescribed antibiotic medicine you are given as directed until it is gone. Take it even if you feel better. It treats the infection and stops it from returning. Not taking all the medicine can make future infections hard to treat.    Keep the infected area clean.    When possible, raise the infected area above the level of your heart. This helps keep swelling down.    Talk with your healthcare provider if you are in pain. Ask what kind of over-the-counter medicine you can take for pain.    Apply clean bandages as advised.    Take your temperature once a day for a week.    Wash your hands often to prevent spreading the infection.  In the future, wash your hands before and after you touch cuts, scratches, or bandages. This will help prevent infection.   When to call your healthcare provider  Call your healthcare provider immediately if you have any of the following:    Difficulty or pain when moving the joints above or below the infected area    Discharge or pus draining from the area    Fever of 100.4 F (38 C) or higher, or as directed by your healthcare provider    Pain that gets worse in or around the infected     Redness that gets worse in or around the infected area, particularly if the area of redness expands to a wider area    Shaking chills    Swelling of the infected area    Vomiting   Date Last Reviewed:  8/1/2016 2000-2017 [employer]. [address] [place] [address], [place] [address]. All rights reserved. This information is not intended as a substitute for professional medical care. Always follow your healthcare professional's instructions.

## 2017-07-17 ENCOUNTER — HOSPITAL ENCOUNTER (OUTPATIENT)
Dept: PET IMAGING | Facility: CLINIC | Age: 57
Discharge: HOME | End: 2017-07-17
Attending: INTERNAL MEDICINE | Admitting: INTERNAL MEDICINE
Payer: COMMERCIAL

## 2017-07-17 DIAGNOSIS — C50.812 MALIGNANT NEOPLASM OF OVERLAPPING SITES OF LEFT FEMALE BREAST (H): ICD-10-CM

## 2017-07-17 LAB — GLUCOSE BLDC GLUCOMTR-MCNC: 86 MG/DL (ref 70–99)

## 2017-07-17 PROCEDURE — 82962 GLUCOSE BLOOD TEST: CPT

## 2017-07-17 PROCEDURE — 34300033 ZZH RX 343: Performed by: INTERNAL MEDICINE

## 2017-07-17 PROCEDURE — 78815 PET IMAGE W/CT SKULL-THIGH: CPT | Mod: PS

## 2017-07-17 PROCEDURE — 25000128 H RX IP 250 OP 636: Performed by: INTERNAL MEDICINE

## 2017-07-17 PROCEDURE — 71260 CT THORAX DX C+: CPT

## 2017-07-17 PROCEDURE — A9552 F18 FDG: HCPCS | Performed by: INTERNAL MEDICINE

## 2017-07-17 RX ORDER — IOPAMIDOL 755 MG/ML
20-135 INJECTION, SOLUTION INTRAVASCULAR ONCE
Status: COMPLETED | OUTPATIENT
Start: 2017-07-17 | End: 2017-07-17

## 2017-07-17 RX ADMIN — IOPAMIDOL 100 ML: 755 INJECTION, SOLUTION INTRAVENOUS at 12:06

## 2017-07-17 RX ADMIN — FLUDEOXYGLUCOSE F-18 12.68 MCI.: 500 INJECTION, SOLUTION INTRAVENOUS at 10:25

## 2017-07-20 ENCOUNTER — TELEPHONE (OUTPATIENT)
Dept: ONCOLOGY | Facility: CLINIC | Age: 57
End: 2017-07-20

## 2017-07-20 NOTE — TELEPHONE ENCOUNTER
Patient called back, reviewed normal Pet/CT results with patient. Patient is aware that results will be further discussed at her exam with Dr. Jasso tomorrow. Kary Schultz RN

## 2017-07-20 NOTE — TELEPHONE ENCOUNTER
Patient called requesting her Pet/CT scan result. Called patient left message for her to call clinic back. Kary Schultz RN

## 2017-07-21 ENCOUNTER — ONCOLOGY VISIT (OUTPATIENT)
Dept: ONCOLOGY | Facility: CLINIC | Age: 57
End: 2017-07-21
Attending: INTERNAL MEDICINE
Payer: COMMERCIAL

## 2017-07-21 ENCOUNTER — HOSPITAL ENCOUNTER (OUTPATIENT)
Facility: CLINIC | Age: 57
Setting detail: SPECIMEN
Discharge: HOME | End: 2017-07-21
Attending: INTERNAL MEDICINE | Admitting: INTERNAL MEDICINE
Payer: COMMERCIAL

## 2017-07-21 VITALS
WEIGHT: 126.4 LBS | TEMPERATURE: 98.2 F | HEART RATE: 85 BPM | BODY MASS INDEX: 20.4 KG/M2 | RESPIRATION RATE: 16 BRPM | DIASTOLIC BLOOD PRESSURE: 75 MMHG | SYSTOLIC BLOOD PRESSURE: 114 MMHG | OXYGEN SATURATION: 96 %

## 2017-07-21 DIAGNOSIS — Z17.0 MALIGNANT NEOPLASM OF OVERLAPPING SITES OF LEFT BREAST IN FEMALE, ESTROGEN RECEPTOR POSITIVE (H): Primary | ICD-10-CM

## 2017-07-21 DIAGNOSIS — C50.812 MALIGNANT NEOPLASM OF OVERLAPPING SITES OF LEFT BREAST IN FEMALE, ESTROGEN RECEPTOR POSITIVE (H): Primary | ICD-10-CM

## 2017-07-21 DIAGNOSIS — C50.812 MALIGNANT NEOPLASM OF OVERLAPPING SITES OF LEFT FEMALE BREAST (H): ICD-10-CM

## 2017-07-21 LAB
BASOPHILS # BLD AUTO: 0.1 10E9/L (ref 0–0.2)
BASOPHILS NFR BLD AUTO: 1.7 %
DIFFERENTIAL METHOD BLD: ABNORMAL
EOSINOPHIL # BLD AUTO: 0 10E9/L (ref 0–0.7)
EOSINOPHIL NFR BLD AUTO: 1.3 %
ERYTHROCYTE [DISTWIDTH] IN BLOOD BY AUTOMATED COUNT: 13.2 % (ref 10–15)
HCT VFR BLD AUTO: 38.3 % (ref 35–47)
HGB BLD-MCNC: 13.5 G/DL (ref 11.7–15.7)
IMM GRANULOCYTES # BLD: 0 10E9/L (ref 0–0.4)
IMM GRANULOCYTES NFR BLD: 0 %
LYMPHOCYTES # BLD AUTO: 0.8 10E9/L (ref 0.8–5.3)
LYMPHOCYTES NFR BLD AUTO: 28.2 %
MCH RBC QN AUTO: 32 PG (ref 26.5–33)
MCHC RBC AUTO-ENTMCNC: 35.2 G/DL (ref 31.5–36.5)
MCV RBC AUTO: 91 FL (ref 78–100)
MONOCYTES # BLD AUTO: 0.5 10E9/L (ref 0–1.3)
MONOCYTES NFR BLD AUTO: 16.4 %
NEUTROPHILS # BLD AUTO: 1.6 10E9/L (ref 1.6–8.3)
NEUTROPHILS NFR BLD AUTO: 52.4 %
NRBC # BLD AUTO: 0 10*3/UL
NRBC BLD AUTO-RTO: 0 /100
PLATELET # BLD AUTO: 187 10E9/L (ref 150–450)
RBC # BLD AUTO: 4.22 10E12/L (ref 3.8–5.2)
WBC # BLD AUTO: 3 10E9/L (ref 4–11)

## 2017-07-21 PROCEDURE — 99215 OFFICE O/P EST HI 40 MIN: CPT | Performed by: INTERNAL MEDICINE

## 2017-07-21 PROCEDURE — 36415 COLL VENOUS BLD VENIPUNCTURE: CPT

## 2017-07-21 PROCEDURE — 85025 COMPLETE CBC W/AUTO DIFF WBC: CPT | Performed by: INTERNAL MEDICINE

## 2017-07-21 PROCEDURE — 99211 OFF/OP EST MAY X REQ PHY/QHP: CPT

## 2017-07-21 RX ORDER — DIPHENOXYLATE HCL/ATROPINE 2.5-.025MG
1 TABLET ORAL 4 TIMES DAILY PRN
COMMUNITY
End: 2018-06-05

## 2017-07-21 ASSESSMENT — PAIN SCALES - GENERAL: PAINLEVEL: NO PAIN (0)

## 2017-07-21 NOTE — MR AVS SNAPSHOT
"              After Visit Summary   7/21/2017    Kita Smith    MRN: [id number]           Patient Information     Date Of Birth          1960        Visit Information        Provider Department      7/21/2017 8:00 AM Nurse,  Oncology [facility]        Today's Diagnoses     Malignant neoplasm of overlapping sites of left female breast (H)           Follow-ups after your visit        Your next 10 appointments already scheduled     Jul 21, 2017  8:00 AM CDT   Return Visit with  Oncology Nurse   [facility]    [facility]  [address]  [facility] [address]   [phone]            Jul 21, 2017  8:30 AM CDT   Return Visit with Preeti Jasso MD   [facility]    [facility]  [address]  [facility] [id number] [phone]              Who to contact     If you have questions or need follow up information about today's clinic visit or your schedule please contact [facility] directly at [phone].  Normal or non-critical lab and imaging results will be communicated to you by MyChart, letter or phone within 4 business days after the clinic has received the results. If you do not hear from us within 7 days, please contact the clinic through [employer] or phone. If you have a critical or abnormal lab result, we will notify you by phone as soon as possible.  Submit refill requests through [employer] or call your pharmacy and they will forward the refill request to us. Please allow 3 business days for your refill to be completed.          Additional Information About Your Visit        MyChart Information     [employer] lets you send messages to your doctor, view your test results, renew your prescriptions, schedule appointments and more. To sign up, go to [email or web address] . Click on \"Log in\" on the left side of the screen, " "which will take you to the Welcome page. Then click on \"Sign up Now\" on the right side of the page.     You will be asked to enter the access code listed below, as well as some personal information. Please follow the directions to create your username and password.     Your access code is: [email or web address]  Expires: 2017 10:47 PM     Your access code will  in 90 days. If you need help or a new code, please call your [facility] or [phone].        Care EveryWhere ID     This is your Care EveryWhere ID. This could be used by other organizations to access your [place] medical records  [id number]        Your Vitals Were     Last Period                   2006            Blood Pressure from Last 3 Encounters:   17 163/83   17 107/73   17 118/87    Weight from Last 3 Encounters:   17 58.8 kg (129 lb 9.6 oz)   17 59 kg (130 lb)   17 60.2 kg (132 lb 12.8 oz)              We Performed the Following     CBC with platelets differential        Primary Care Provider Office Phone # Fax #    Checo Crockett [phone]       [facility] [place] [facility] [address] [place] [address]        Equal Access to Services     SANAM VILLEGAS : Hadii aad ku hadasho Soomaali, waaxda luqadaha, qaybta kaalmada adeegyada, shayne narayanan. So [facility] [phone].    ATENCIÓN: Si habla español, tiene a jasso disposición servicios gratuitos de asistencia lingüística. Darius al [phone].    We comply with applicable federal civil rights laws and [place] laws. We do not discriminate on the basis of race, color, national origin, age, disability sex, sexual orientation or gender identity.            Thank you!     Thank you for choosing [facility]  for your care. Our goal is always to provide you with excellent care. Hearing back from our patients is one way we can continue to improve our services. Please take a few minutes " to complete the written survey that you may receive in the mail after your visit with us. Thank you!             Your Updated Medication List - Protect others around you: Learn how to safely use, store and throw away your medicines at www.disposemymeds.org.          This list is accurate as of: 7/21/17  7:56 AM.  Always use your most recent med list.                   Brand Name Dispense Instructions for use Diagnosis    amitriptyline 10 MG tablet    ELAVIL     Take 10-20 mg by mouth Reported on 4/21/2017        anastrozole 1 MG tablet    ARIMIDEX    90 tablet    Take 1 tablet (1 mg) by mouth daily    Malignant neoplasm of overlapping sites of left female breast (H)       B Complex-C Tabs           CALCIUM + D3 PO           calcium carbonate 500 MG chewable tablet    TUMS     Take 1 chew tab by mouth daily Reported on 4/21/2017        coenzyme Q-10 10 MG Caps      Take 200 mg by mouth        LORazepam 0.5 MG tablet    ATIVAN    30 tablet    Take 1 tablet (0.5 mg) by mouth every 4 hours as needed (Anxiety, Nausea/Vomiting or Sleep)    Malignant neoplasm of overlapping sites of left female breast (H)       olopatadine 0.1 % ophthalmic solution    PATANOL     Place 1 drop into both eyes 2 times daily as needed for allergies        omeprazole 40 MG capsule    priLOSEC    90 capsule    Take 1 capsule (40 mg) by mouth daily Take 30-60 minutes before a meal.    Gastroesophageal reflux disease without esophagitis       ondansetron 8 MG tablet    ZOFRAN    30 tablet    Take 1 tablet (8 mg) by mouth every 8 hours as needed for nausea    Nausea       traMADol 50 MG tablet    ULTRAM    60 tablet    Take 1 tablet (50 mg) by mouth every 6 hours as needed for pain (maximum 8 tablets per day)    Malignant neoplasm of overlapping sites of left female breast (H)       vitamin  s/Minerals Tabs           ZOFRAN ODT PO      Take 4 mg by mouth Reported on 4/21/2017        zolpidem 10 MG tablet    AMBIEN     Take 10 mg by mouth

## 2017-07-21 NOTE — LETTER
"    7/21/2017        RE: Kita Smith  [address] [place]  [facility] [address]        Oncology Rooming Note    July 21, 2017 7:57 AM   Kita Smith is a 56 year old female who presents for:    Chief Complaint   Patient presents with     Oncology Clinic Visit     Breast cancer, female      Initial Vitals: /75 (BP Location: Right arm, Patient Position: Sitting, Cuff Size: Adult Regular)  Pulse 85  Temp 98.2  F (36.8  C) (Oral)  Resp 16  Wt 57.3 kg (126 lb 6.4 oz)  LMP 12/01/2006  SpO2 96%  BMI 20.4 kg/m2 Estimated body mass index is 20.4 kg/(m^2) as calculated from the following:    Height as of 6/21/17: 1.676 m (5' 6\").    Weight as of this encounter: 57.3 kg (126 lb 6.4 oz). Body surface area is 1.63 meters squared.  No Pain (0) Comment: Data Unavailable   Patient's last menstrual period was 12/01/2006.  Allergies reviewed: Yes  Medications reviewed: Yes    Medications: Medication refills not needed today.  Pharmacy name entered into Tay[facility]:    [id number] - [facility] [address] AT [facility] & [address] [facility]  [address]    Clinical concerns: None                  5 minutes for nursing intake (face to face time)     Kajal Bailey MA              [facility]    Hematology/Oncology Established Patient Follow-up Note      Today's Date: 07/21/2017    Reason for Follow-up: history of breast cancer and melanoma    HISTORY OF PRESENT ILLNESS: Kita Smith is a 56 year old female who is being followed for adenocarcinoma of the breast as well as melanoma. She was previously followed by Dr. Minor.  Kita is known to be BRCA-2 positive. She has undergone bilateral mastectomy.  She has also undergone hysterectomy and bilateral salpingo-oophorectomy on 04/06/2013 for prophylaxis of ovarian and uterine cancer.     She first presented with multifocal lesions in the left breast in 2006. She " underwent left mastectomy on 01/28/2006 demonstrating multifocal infiltrating ductal adenocarcinoma, grade 2, with DCIS. She had 3 separate lesions measuring 1.5 cm, 0.5 cm, and 0.4 cm. All lymph nodes were negative. The tumor was ER/DC positive and HER-2 negative. She received 4 cycles of dose-dense Adriamycin and Cytoxan and was then on tamoxifen through 2012 when it was discontinued.     In the summer of 2012, Kita noted a hyperpigmented lesion over the right neck which was rapidly growing and changing over 1-2 months. The lesion was excised by Dr. Nataliia Baron. The lesion was a Santiago level III melanoma Breslow depth 0.5 mm.   Stage IA.  A wide excision was subsequently performed by Dr. Nataliia Baron, with 1 cm margins and no evidence of residual disease. The patient was subsequently referred to Dr. Childers. A PET-CT showed no evidence of metastatic disease. 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 Bazan of [facility] in March 2016.  This was stage 0.    On a CT scan study done on 10/12/2010 she was noted to have multiple bilateral pulmonary nodules, which were felt to be granulomatous. These have been followed; these were PET negative. Her most recent CT of the chest done 04/05/2013 showed that the tiny pulmonary nodules were stable over 3 years and therefore these are no longer followed on a routine basis.      PET-CT in June 2016 showed left axillary lymphadenopathy and biopsy of lymph node showed metastatic breast carcinoma, consistent with recurrence.  On 6/27/16, she underwent left lateral breast lesion excision and left axillary dissection.  Pathology showed invasive ductal carcinoma, grade 3, tumor size 1.5 cm +LVI, 6 of 31 lymph nodes were involved, ER positive (75%), DC positive (5%), HER-2 negative.  4 of 29 lymph node were positive in the axillary dissection.  There was excision of a second breast nodule that also had  metastatic breast cancer.      She began chemotherapy on 8/11/16, and completed docetaxel+carboplatin x 6 cycles on 11/25/16.  She completed radiation in late February 2017.      Port was removed on 3/16/17.    She started anastrozole on 3/16/17.      INTERIM HISTORY:  Kita comes in for follow-up today.   She has been feeling much better.  She has been biking a lot and has felt much better with the exercise.  She has not needed to take tramadol for her back pain any more.  Her left arm pain is better as well.  She has been cutting back on the arm massages and is no longer doing therapy.         REVIEW OF SYSTEMS:   14 point ROS was reviewed and is negative other than as noted above in HPI.       HOME MEDICATIONS:  Current Outpatient Prescriptions   Medication Sig Dispense Refill     traMADol (ULTRAM) 50 MG tablet Take 1 tablet (50 mg) by mouth every 6 hours as needed for pain (maximum 8 tablets per day) 60 tablet 1     Calcium Carb-Cholecalciferol (CALCIUM + D3 PO)        anastrozole (ARIMIDEX) 1 MG tablet Take 1 tablet (1 mg) by mouth daily 90 tablet 3     amitriptyline (ELAVIL) 10 MG tablet Take 10-20 mg by mouth Reported on 4/21/2017       B Complex-C TABS        coenzyme Q-10 10 MG CAPS Take 200 mg by mouth       vitamin  s/Minerals TABS        zolpidem (AMBIEN) 10 MG tablet Take 10 mg by mouth       calcium carbonate (TUMS) 500 MG chewable tablet Take 1 chew tab by mouth daily Reported on 4/21/2017       omeprazole (PRILOSEC) 40 MG capsule Take 1 capsule (40 mg) by mouth daily Take 30-60 minutes before a meal. 90 capsule 3     LORazepam (ATIVAN) 0.5 MG tablet Take 1 tablet (0.5 mg) by mouth every 4 hours as needed (Anxiety, Nausea/Vomiting or Sleep) 30 tablet 2     ondansetron (ZOFRAN) 8 MG tablet Take 1 tablet (8 mg) by mouth every 8 hours as needed for nausea 30 tablet 3     Ondansetron (ZOFRAN ODT PO) Take 4 mg by mouth Reported on 4/21/2017       olopatadine (PATANOL) 0.1 % ophthalmic solution Place 1  drop into both eyes 2 times daily as needed for allergies           ALLERGIES:  Allergies   Allergen Reactions     Percocet [Oxycodone-Acetaminophen] Nausea     Adhesive Tape Blisters     REDNESS,  bandaides     Hydrocodone Nausea and Vomiting     Latex      Wound Dressing Adhesive          PAST MEDICAL HISTORY:  Past Medical History:   Diagnosis Date     Basal cell carcinoma      Breast CA (H)      Depression with anxiety      Histoplasmosis      Malignant melanoma (H)      PONV (postoperative nausea and vomiting)          PAST SURGICAL HISTORY:  Past Surgical History:   Procedure Laterality Date     BIOPSY OF SKIN LESION       BREAST SURGERY      bilat mastectomy       C RAD RESEC TONSIL/PILLARS        SECTION      times 2     CL AFF SURGICAL PATHOLOGY      left wrist     DAVINCI HYSTERECTOMY TOTAL, BILATERAL SALPINGO-OOPHORECTOMY, COMBINED  2013    Procedure: COMBINED DAVINCI HYSTERECTOMY TOTAL, SALPINGO-OOPHORECTOMY;  ROBOTIC ASSISTED TOTAL LAPAROSCOPIC HYSTERECTOMY, BILATERAL SALPINGO OOPHORECTOMY, PARTIAL VULVECTOMY ;  Surgeon: Hira Jenkins MD;  Location:  OR     DISSECT LYMPH NODE AXILLA Left 2016    Procedure: DISSECT LYMPH NODE AXILLA;  Surgeon: Hebert Driscoll MD;  Location:  OR     GENERAL SURGERY                           DATE:   &    C section     HC REVISE BREAST RECONSTRUCTION       HEMORRHOIDECTOMY       INSERT PORT VASCULAR ACCESS Right 2016    Procedure: INSERT PORT VASCULAR ACCESS;  Surgeon: Hebert Driscoll MD;  Location:  OR     MASTECTOMY       melanoma removal[      right sided neck     MOHS MICROGRAPHIC PROCEDURE       PROCTOPLASTY  2013    Procedure: PROCTOPLASTY;  PROCTOPLASTY TO REPAIR ANAL FISSURE AND STENOSIS;  Surgeon: Goldberg, Stanley Morton, MD;  Location: [facility]     REMOVE PORT VASCULAR ACCESS N/A 3/16/2017    Procedure: REMOVE PORT VASCULAR ACCESS;  Surgeon: Hebert Driscoll MD;  Location: [facility]      VULVECTOMY SIMPLE  2013    Procedure: VULVECTOMY SIMPLE;;  Surgeon: Hira Jenkins MD;  Location:  OR         SOCIAL HISTORY:  Social History     Social History     Marital status:      Spouse name: N/A     Number of children: N/A     Years of education: N/A     Occupational History     Not on file.     Social History Main Topics     Smoking status: Former Smoker     Packs/day: 1.50     Years: 4.00     Types: Cigarettes     Quit date: 1980     Smokeless tobacco: Never Used     Alcohol use Yes      Comment: occasionally     Drug use: No     Sexual activity: Not on file     Other Topics Concern     Not on file     Social History Narrative         FAMILY HISTORY:  Family History   Problem Relation Age of Onset     CANCER Mother      brain     Other Cancer Mother      Cancer - colorectal Father      Hypertension Father      Colon Cancer Father      Other Cancer Sister      CANCER Maternal Aunt      breast     CANCER Other      breast in cousin     Other Cancer Maternal Grandfather      Breast Cancer Paternal Grandmother      Other Cancer Paternal Grandfather          PHYSICAL EXAM:  Vital signs:  /75 (BP Location: Right arm, Patient Position: Sitting, Cuff Size: Adult Regular)  Pulse 85  Temp 98.2  F (36.8  C) (Oral)  Resp 16  Wt 57.3 kg (126 lb 6.4 oz)  LMP 2006  SpO2 96%  BMI 20.4 kg/m2   ECO  GENERAL/CONSTITUTIONAL: No acute distress.  EYES: No scleral icterus.  LYMPH: No anterior cervical, posterior cervical, supraclavicular, or axillary adenopathy.   RESPIRATORY: Clear to auscultation bilaterally. No crackles or wheezing.   CARDIOVASCULAR: Regular rate and rhythm without murmurs, gallops, or rubs.  GASTROINTESTINAL: No tenderness. The patient has normal bowel sounds. No guarding.  No distention.  BREAST: s/p bilateral mastectomy with implants in place.  No palpable chest wall masses.  MUSCULOSKELETAL: Warm and well-perfused. Lymphedema of left arm.  NEUROLOGIC: Alert,  oriented, answers questions appropriately.  INTEGUMENTARY: No jaundice.  Numerous scattered moles.    GAIT: Steady, does not use assistive device  Port has been removed.        LABS:  CBC RESULTS:   Recent Labs   Lab Test  07/21/17   0753   WBC  3.0*   RBC  4.22   HGB  13.5   HCT  38.3   MCV  91   MCH  32.0   MCHC  35.2   RDW  13.2   PLT  187         IMAGING:  DEXA scan 3/15/17:  FINDINGS:   Lumbar spine: The T-score is -1.3 from L1 to L4.      Hips: The left hip femoral neck T-score is -2.1. The right hip femoral  neck T-score is -2.1. Bone mineral density in the worst hip is 0.741  gm/cm2.      IMPRESSION:  1. Advanced osteopenia of both hips with mild osteopenia of the lumbar  spine.  2. The probability of major osteoporotic fracture is 14.8 percent and  probability of hip fracture is 2.4 percent within the next 10 years  according to FRAX risk assessment.      PET-CT 7/17/17:  FINDINGS: Normal physiologic uptake is identified within the salivary  glands, myocardium, kidneys, ureters and bladder.  Scattered areas of  physiologic bowel uptake are also present.     NECK:  Lymph nodes: No pathologic activity. No enlarged cervical lymph nodes.     Additional findings: None.     CHEST:  Lungs: No pathologic activity. Scattered indeterminate bibasilar lung  nodules are again noted and appear stable. Calcified granuloma  posterior left lower lobe is unchanged. No new or enlarging lung  lesions.     Lymph nodes: No pathologic activity. Previously noted hypermetabolic  left axillary lymph nodes have essentially resolved. Only low to  intermediate FDG activity is noted in the left axilla without evidence  of discrete lymph nodes. Findings are probably related to post  procedure and post therapy changes with secondary inflammation. This  area demonstrates an SUV max 2.2 on the current exam, previously  measuring up to an SUV max 6.7, previous suspected area of treated  adenopathy in the left paratracheal region has  improved. No abnormal  FDG activity is noted in this area. Small amount of thymic tissue is  present and is unchanged with intermediate FDG activity. This is  likely related to rebound activity.     Additional findings: No pleural or pericardial fluid. Heart is normal  in size. Esophagus is unremarkable. Patient has had prior bilateral  mastectomies with implant reconstructions.     ABDOMEN/PELVIS:  Hepatobiliary: No pathologic activity. Left hepatic dome cyst is  unchanged. No associated abnormal FDG activity. The liver is otherwise  unremarkable. No calcified gallstones are evident.     Spleen: No pathologic activity. No enlargement or mass. Probable tiny  cyst shows no abnormal FDG activity. This was not definitely  identified on the prior PET/CT.     Pancreas: No pathologic activity. No evidence of a mass or surrounding  inflammation. Apparent pancreas divisum anomaly without significant  dilatation of the main pancreatic duct.     Kidneys: No pathologic activity. No renal calculi or hydronephrosis.  Kidneys, ureters and bladder are unremarkable.     Adrenals: No pathologic activity. No adrenal mass.     Reproductive: No pathologic activity. Prior hysterectomy.     Gastrointestinal: No pathologic activity. No evidence of bowel  obstruction. Appendix is normal.     Lymph nodes: No pathologic activity. No enlarged abdominal or pelvic  lymph nodes.     Additional findings: None.     SKELETON:   No pathologic activity.         IMPRESSION:  1. Previously described hypermetabolic left axillary lymph nodes are  no longer evident. Only a vague area of low to intermediate FDG  activity is noted in the left axilla likely related to post therapy  inflammation. No discrete area of hypermetabolism is noted.   2. No evidence of metastatic disease in the neck, chest, abdomen or  pelvis.  3. Left hepatic dome cyst is stable.  4. Stable-appearing lung base nodules compared to prior PET/CT. These  are likely too small for  characterization by PET imaging, but no  abnormal FDG activity is evident. Calcified granuloma posterior left  lower lobe is unchanged.       ASSESSMENT/PLAN:  Kita Smith is a 56 year old female:    1) Left breast cancer: diagnosed in 2006, s/p bilateral mastectomy and chemotherapy and hormone therapy.  She is known to be BRCA-2 positive and has undergone hysterectomy and bilateral salpingo-oophorectomy on 04/06/2013 for prophylaxis of ovarian and uterine cancer.  Now with recurrence, s/p left lateral breast lesion excision and left axillary dissection on 6/27/16.  Pathology showed invasive ductal carcinoma, grade 3, tumor size 1.5 cm +LVI, 6 of 31 lymph nodes were involved, ER positive (75%), WI positive (5%), HER-2 negative.  4 of 29 lymph node were positive in the axillary dissection.  There was excision of a second breast nodule that also had metastatic breast cancer.      She has completed chemotherapy and radiation.  She started anastrozole on 3/7/17.    She had a PET-CT done that shows no evidence of disease.  There are stable lung nodules and a left hepatic dome cyst.      -continue anastrozole  -Kita wants routine imaging.  With her recurrent cancer and high-risk BRCA, it would be reasonable to obtain imaging.  She initially wanted annual PET-CT's, but after thinking about things and considering the risks, she would like to have them every 3 years.  -patient okay with no labs or tumor markers though, since that was normal throughout her recurrence and is likely not useful  -RTC in 3 months    2) Left arm and back pain: started after surgery, and worsened on chemotherapy and radiation. It has improved, and she is no longer using tramadol.  She has completed lymphedema therapy and physical therapy.      3) Lymphedema:  -much improved; she has completed lymphedema therapy    4) Melanoma: She has history of malignant melanoma x 2, stage IA of the right neck and stage 0 of the left temple.    -she  follows every 6 months with dermatologist close to her home    5) Pulmonary nodules: were found in 2010 that were thought granulomatous and PET negative at the time, and have been stable on subsequent scans.     6) Reflux:  -she tried omeprazole     7) Osteopenia: osteopenia of both hips and mild osteopenia of the lumbar spine  -take calcium and vitamin D  -repeat DEXA scan in March 2019    8) Leukopenia: May be from chemotherapy and radiation.  It remains mildly low at 3K, but with normal differential.  -will repeat CBC in 3 months when she returns      I spent a total of 40 minutes with the patient, with over >50% of the time in counseling and/or coordination of care.      Preeti Jasso MD  Hematology/Oncology  [facility]            Sincerely,        Preeti Jasso MD

## 2017-07-21 NOTE — PROGRESS NOTES
[facility]    Hematology/Oncology Established Patient Follow-up Note      Today's Date: 07/21/2017    Reason for Follow-up: history of breast cancer and melanoma    HISTORY OF PRESENT ILLNESS: Kita Smith is a 56 year old female who is being followed for adenocarcinoma of the breast as well as melanoma. She was previously followed by Dr. Minor.  Kita is known to be BRCA-2 positive. She has undergone bilateral mastectomy.  She has also undergone hysterectomy and bilateral salpingo-oophorectomy on 04/06/2013 for prophylaxis of ovarian and uterine cancer.     She first presented with multifocal lesions in the left breast in 2006. She underwent left mastectomy on 01/28/2006 demonstrating multifocal infiltrating ductal adenocarcinoma, grade 2, with DCIS. She had 3 separate lesions measuring 1.5 cm, 0.5 cm, and 0.4 cm. All lymph nodes were negative. The tumor was ER/OH positive and HER-2 negative. She received 4 cycles of dose-dense Adriamycin and Cytoxan and was then on tamoxifen through 2012 when it was discontinued.     In the summer of 2012, Kita noted a hyperpigmented lesion over the right neck which was rapidly growing and changing over 1-2 months. The lesion was excised by Dr. Nataliia Baron. The lesion was a Santiago level III melanoma Breslow depth 0.5 mm.   Stage IA.  A wide excision was subsequently performed by Dr. Nataliia Baron, with 1 cm margins and no evidence of residual disease. The patient was subsequently referred to Dr. Childers. A PET-CT showed no evidence of metastatic disease. 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 Bazan of [facility] in March 2016.  This was stage 0.    On a CT scan study done on 10/12/2010 she was noted to have multiple bilateral pulmonary nodules, which were felt to be granulomatous. These have been followed; these were PET negative. Her most recent CT of the chest  done 04/05/2013 showed that the tiny pulmonary nodules were stable over 3 years and therefore these are no longer followed on a routine basis.      PET-CT in June 2016 showed left axillary lymphadenopathy and biopsy of lymph node showed metastatic breast carcinoma, consistent with recurrence.  On 6/27/16, she underwent left lateral breast lesion excision and left axillary dissection.  Pathology showed invasive ductal carcinoma, grade 3, tumor size 1.5 cm +LVI, 6 of 31 lymph nodes were involved, ER positive (75%), WI positive (5%), HER-2 negative.  4 of 29 lymph node were positive in the axillary dissection.  There was excision of a second breast nodule that also had metastatic breast cancer.      She began chemotherapy on 8/11/16, and completed docetaxel+carboplatin x 6 cycles on 11/25/16.  She completed radiation in late February 2017.      Port was removed on 3/16/17.    She started anastrozole on 3/16/17.      INTERIM HISTORY:  Kita comes in for follow-up today.   She has been feeling much better.  She has been biking a lot and has felt much better with the exercise.  She has not needed to take tramadol for her back pain any more.  Her left arm pain is better as well.  She has been cutting back on the arm massages and is no longer doing therapy.         REVIEW OF SYSTEMS:   14 point ROS was reviewed and is negative other than as noted above in HPI.       HOME MEDICATIONS:  Current Outpatient Prescriptions   Medication Sig Dispense Refill     traMADol (ULTRAM) 50 MG tablet Take 1 tablet (50 mg) by mouth every 6 hours as needed for pain (maximum 8 tablets per day) 60 tablet 1     Calcium Carb-Cholecalciferol (CALCIUM + D3 PO)        anastrozole (ARIMIDEX) 1 MG tablet Take 1 tablet (1 mg) by mouth daily 90 tablet 3     amitriptyline (ELAVIL) 10 MG tablet Take 10-20 mg by mouth Reported on 4/21/2017       B Complex-C TABS        coenzyme Q-10 10 MG CAPS Take 200 mg by mouth       vitamin  s/Minerals TABS         zolpidem (AMBIEN) 10 MG tablet Take 10 mg by mouth       calcium carbonate (TUMS) 500 MG chewable tablet Take 1 chew tab by mouth daily Reported on 2017       omeprazole (PRILOSEC) 40 MG capsule Take 1 capsule (40 mg) by mouth daily Take 30-60 minutes before a meal. 90 capsule 3     LORazepam (ATIVAN) 0.5 MG tablet Take 1 tablet (0.5 mg) by mouth every 4 hours as needed (Anxiety, Nausea/Vomiting or Sleep) 30 tablet 2     ondansetron (ZOFRAN) 8 MG tablet Take 1 tablet (8 mg) by mouth every 8 hours as needed for nausea 30 tablet 3     Ondansetron (ZOFRAN ODT PO) Take 4 mg by mouth Reported on 2017       olopatadine (PATANOL) 0.1 % ophthalmic solution Place 1 drop into both eyes 2 times daily as needed for allergies           ALLERGIES:  Allergies   Allergen Reactions     Percocet [Oxycodone-Acetaminophen] Nausea     Adhesive Tape Blisters     REDNESS,  bandaides     Hydrocodone Nausea and Vomiting     Latex      Wound Dressing Adhesive          PAST MEDICAL HISTORY:  Past Medical History:   Diagnosis Date     Basal cell carcinoma      Breast CA (H)      Depression with anxiety      Histoplasmosis      Malignant melanoma (H)      PONV (postoperative nausea and vomiting)          PAST SURGICAL HISTORY:  Past Surgical History:   Procedure Laterality Date     BIOPSY OF SKIN LESION       BREAST SURGERY      bilat mastectomy       C RAD RESEC TONSIL/PILLARS        SECTION      times 2     CL AFF SURGICAL PATHOLOGY      left wrist     DAVINCI HYSTERECTOMY TOTAL, BILATERAL SALPINGO-OOPHORECTOMY, COMBINED  2013    Procedure: COMBINED DAVINCI HYSTERECTOMY TOTAL, SALPINGO-OOPHORECTOMY;  ROBOTIC ASSISTED TOTAL LAPAROSCOPIC HYSTERECTOMY, BILATERAL SALPINGO OOPHORECTOMY, PARTIAL VULVECTOMY ;  Surgeon: Hira Jenkins MD;  Location:  OR     DISSECT LYMPH NODE AXILLA Left 2016    Procedure: DISSECT LYMPH NODE AXILLA;  Surgeon: Hebert Driscoll MD;  Location:  OR     GENERAL SURGERY                            DATE:  1996 &2000    C section     HC REVISE BREAST RECONSTRUCTION       HEMORRHOIDECTOMY  1983     INSERT PORT VASCULAR ACCESS Right 6/27/2016    Procedure: INSERT PORT VASCULAR ACCESS;  Surgeon: Hebert Driscoll MD;  Location:  OR     MASTECTOMY       melanoma removal[      right sided neck     MOHS MICROGRAPHIC PROCEDURE       PROCTOPLASTY  4/13/2013    Procedure: PROCTOPLASTY;  PROCTOPLASTY TO REPAIR ANAL FISSURE AND STENOSIS;  Surgeon: Goldberg, Stanley Morton, MD;  Location:  SD     REMOVE PORT VASCULAR ACCESS N/A 3/16/2017    Procedure: REMOVE PORT VASCULAR ACCESS;  Surgeon: Hebert Driscoll MD;  Location:  SD     VULVECTOMY SIMPLE  4/26/2013    Procedure: VULVECTOMY SIMPLE;;  Surgeon: Hira Jenkins MD;  Location:  OR         SOCIAL HISTORY:  Social History     Social History     Marital status:      Spouse name: N/A     Number of children: N/A     Years of education: N/A     Occupational History     Not on file.     Social History Main Topics     Smoking status: Former Smoker     Packs/day: 1.50     Years: 4.00     Types: Cigarettes     Quit date: 1/11/1980     Smokeless tobacco: Never Used     Alcohol use Yes      Comment: occasionally     Drug use: No     Sexual activity: Not on file     Other Topics Concern     Not on file     Social History Narrative         FAMILY HISTORY:  Family History   Problem Relation Age of Onset     CANCER Mother      brain     Other Cancer Mother      Cancer - colorectal Father      Hypertension Father      Colon Cancer Father      Other Cancer Sister      CANCER Maternal Aunt      breast     CANCER Other      breast in cousin     Other Cancer Maternal Grandfather      Breast Cancer Paternal Grandmother      Other Cancer Paternal Grandfather          PHYSICAL EXAM:  Vital signs:  /75 (BP Location: Right arm, Patient Position: Sitting, Cuff Size: Adult Regular)  Pulse 85  Temp 98.2  F (36.8  C) (Oral)  Resp 16  Wt  57.3 kg (126 lb 6.4 oz)  LMP 2006  SpO2 96%  BMI 20.4 kg/m2   ECO  GENERAL/CONSTITUTIONAL: No acute distress.  EYES: No scleral icterus.  LYMPH: No anterior cervical, posterior cervical, supraclavicular, or axillary adenopathy.   RESPIRATORY: Clear to auscultation bilaterally. No crackles or wheezing.   CARDIOVASCULAR: Regular rate and rhythm without murmurs, gallops, or rubs.  GASTROINTESTINAL: No tenderness. The patient has normal bowel sounds. No guarding.  No distention.  BREAST: s/p bilateral mastectomy with implants in place.  No palpable chest wall masses.  MUSCULOSKELETAL: Warm and well-perfused. Lymphedema of left arm.  NEUROLOGIC: Alert, oriented, answers questions appropriately.  INTEGUMENTARY: No jaundice.  Numerous scattered moles.    GAIT: Steady, does not use assistive device  Port has been removed.        LABS:  CBC RESULTS:   Recent Labs   Lab Test  17   0753   WBC  3.0*   RBC  4.22   HGB  13.5   HCT  38.3   MCV  91   MCH  32.0   MCHC  35.2   RDW  13.2   PLT  187         IMAGING:  DEXA scan 3/15/17:  FINDINGS:   Lumbar spine: The T-score is -1.3 from L1 to L4.      Hips: The left hip femoral neck T-score is -2.1. The right hip femoral  neck T-score is -2.1. Bone mineral density in the worst hip is 0.741  gm/cm2.      IMPRESSION:  1. Advanced osteopenia of both hips with mild osteopenia of the lumbar  spine.  2. The probability of major osteoporotic fracture is 14.8 percent and  probability of hip fracture is 2.4 percent within the next 10 years  according to FRAX risk assessment.      PET-CT 17:  FINDINGS: Normal physiologic uptake is identified within the salivary  glands, myocardium, kidneys, ureters and bladder.  Scattered areas of  physiologic bowel uptake are also present.     NECK:  Lymph nodes: No pathologic activity. No enlarged cervical lymph nodes.     Additional findings: None.     CHEST:  Lungs: No pathologic activity. Scattered indeterminate bibasilar  lung  nodules are again noted and appear stable. Calcified granuloma  posterior left lower lobe is unchanged. No new or enlarging lung  lesions.     Lymph nodes: No pathologic activity. Previously noted hypermetabolic  left axillary lymph nodes have essentially resolved. Only low to  intermediate FDG activity is noted in the left axilla without evidence  of discrete lymph nodes. Findings are probably related to post  procedure and post therapy changes with secondary inflammation. This  area demonstrates an SUV max 2.2 on the current exam, previously  measuring up to an SUV max 6.7, previous suspected area of treated  adenopathy in the left paratracheal region has improved. No abnormal  FDG activity is noted in this area. Small amount of thymic tissue is  present and is unchanged with intermediate FDG activity. This is  likely related to rebound activity.     Additional findings: No pleural or pericardial fluid. Heart is normal  in size. Esophagus is unremarkable. Patient has had prior bilateral  mastectomies with implant reconstructions.     ABDOMEN/PELVIS:  Hepatobiliary: No pathologic activity. Left hepatic dome cyst is  unchanged. No associated abnormal FDG activity. The liver is otherwise  unremarkable. No calcified gallstones are evident.     Spleen: No pathologic activity. No enlargement or mass. Probable tiny  cyst shows no abnormal FDG activity. This was not definitely  identified on the prior PET/CT.     Pancreas: No pathologic activity. No evidence of a mass or surrounding  inflammation. Apparent pancreas divisum anomaly without significant  dilatation of the main pancreatic duct.     Kidneys: No pathologic activity. No renal calculi or hydronephrosis.  Kidneys, ureters and bladder are unremarkable.     Adrenals: No pathologic activity. No adrenal mass.     Reproductive: No pathologic activity. Prior hysterectomy.     Gastrointestinal: No pathologic activity. No evidence of bowel  obstruction. Appendix is  normal.     Lymph nodes: No pathologic activity. No enlarged abdominal or pelvic  lymph nodes.     Additional findings: None.     SKELETON:   No pathologic activity.         IMPRESSION:  1. Previously described hypermetabolic left axillary lymph nodes are  no longer evident. Only a vague area of low to intermediate FDG  activity is noted in the left axilla likely related to post therapy  inflammation. No discrete area of hypermetabolism is noted.   2. No evidence of metastatic disease in the neck, chest, abdomen or  pelvis.  3. Left hepatic dome cyst is stable.  4. Stable-appearing lung base nodules compared to prior PET/CT. These  are likely too small for characterization by PET imaging, but no  abnormal FDG activity is evident. Calcified granuloma posterior left  lower lobe is unchanged.       ASSESSMENT/PLAN:  Kita Smith is a 56 year old female:    1) Left breast cancer: diagnosed in 2006, s/p bilateral mastectomy and chemotherapy and hormone therapy.  She is known to be BRCA-2 positive and has undergone hysterectomy and bilateral salpingo-oophorectomy on 04/06/2013 for prophylaxis of ovarian and uterine cancer.  Now with recurrence, s/p left lateral breast lesion excision and left axillary dissection on 6/27/16.  Pathology showed invasive ductal carcinoma, grade 3, tumor size 1.5 cm +LVI, 6 of 31 lymph nodes were involved, ER positive (75%), MI positive (5%), HER-2 negative.  4 of 29 lymph node were positive in the axillary dissection.  There was excision of a second breast nodule that also had metastatic breast cancer.      She has completed chemotherapy and radiation.  She started anastrozole on 3/7/17.    She had a PET-CT done that shows no evidence of disease.  There are stable lung nodules and a left hepatic dome cyst.      -continue anastrozole  -Kita wants routine imaging.  With her recurrent cancer and high-risk BRCA, it would be reasonable to obtain imaging.  She initially wanted annual  PET-CT's, but after thinking about things and considering the risks, she would like to have them every 3 years.  -patient okay with no labs or tumor markers though, since that was normal throughout her recurrence and is likely not useful  -RTC in 3 months    2) Left arm and back pain: started after surgery, and worsened on chemotherapy and radiation. It has improved, and she is no longer using tramadol.  She has completed lymphedema therapy and physical therapy.      3) Lymphedema:  -much improved; she has completed lymphedema therapy    4) Melanoma: She has history of malignant melanoma x 2, stage IA of the right neck and stage 0 of the left temple.    -she follows every 6 months with dermatologist close to her home    5) Pulmonary nodules: were found in 2010 that were thought granulomatous and PET negative at the time, and have been stable on subsequent scans.     6) Reflux:  -she tried omeprazole     7) Osteopenia: osteopenia of both hips and mild osteopenia of the lumbar spine  -take calcium and vitamin D  -repeat DEXA scan in March 2019    8) Leukopenia: May be from chemotherapy and radiation.  It remains mildly low at 3K, but with normal differential.  -will repeat CBC in 3 months when she returns      I spent a total of 40 minutes with the patient, with over >50% of the time in counseling and/or coordination of care.      Preeti Jasso MD  Hematology/Oncology  [facility]

## 2017-07-21 NOTE — MR AVS SNAPSHOT
After Visit Summary   7/21/2017    Kita Smith    MRN: [id number]           Patient Information     Date Of Birth          1960        Visit Information        Provider Department      7/21/2017 8:30 AM Preeti Jasso MD [facility]        Today's Diagnoses     Malignant neoplasm of overlapping sites of left breast in female, estrogen receptor positive (H)    -  1      Care Instructions    Return to clinic in 3 months with lab same day    10/20/2017 7:30 am Lab draw     10/20/2017 8:00 am Dr. Jasso          Follow-ups after your visit        Your next 10 appointments already scheduled     Oct 20, 2017  7:30 AM CDT   Return Visit with  Oncology Nurse   [facility] ([facility])    [facility]  [address]  [place] [address]   [phone]            Oct 20, 2017  8:00 AM CDT   Return Visit with Preeti Jasso MD   [facility] ([facility])    [facility]  [address]  [place] [address]   [phone]              Future tests that were ordered for you today     Open Future Orders        Priority Expected Expires Ordered    CBC with platelets differential Routine 10/21/2017 7/21/2018 7/21/2017            Who to contact     If you have questions or need follow up information about today's clinic visit or your schedule please contact [facility] directly at [phone].  Normal or non-critical lab and imaging results will be communicated to you by MyChart, letter or phone within 4 business days after the clinic has received the results. If you do not hear from us within 7 days, please contact the clinic through MyChart or phone. If you have a critical or abnormal lab result, we will notify you by phone as soon as possible.  Submit refill requests through [employer] or call your pharmacy and they will forward the refill  "request to us. Please allow 3 business days for your refill to be completed.          Additional Information About Your Visit        Friend Travelerhart Information     [employer] lets you send messages to your doctor, view your test results, renew your prescriptions, schedule appointments and more. To sign up, go to [email or web address] . Click on \"Log in\" on the left side of the screen, which will take you to the Welcome page. Then click on \"Sign up Now\" on the right side of the page.     You will be asked to enter the access code listed below, as well as some personal information. Please follow the directions to create your username and password.     Your access code is: [email or web address]  Expires: 2017 10:47 PM     Your access code will  in 90 days. If you need help or a new code, please call your [place] clinic or [phone].        Care EveryWhere ID     This is your Care EveryWhere ID. This could be used by other organizations to access your [place] medical records  [id number]        Your Vitals Were     Pulse Temperature Respirations Last Period Pulse Oximetry BMI (Body Mass Index)    85 98.2  F (36.8  C) (Oral) 16 2006 96% 20.4 kg/m2       Blood Pressure from Last 3 Encounters:   17 114/75   17 163/83   17 107/73    Weight from Last 3 Encounters:   17 57.3 kg (126 lb 6.4 oz)   17 58.8 kg (129 lb 9.6 oz)   17 59 kg (130 lb)                 Today's Medication Changes          These changes are accurate as of: 17  8:56 AM.  If you have any questions, ask your nurse or doctor.               Stop taking these medicines if you haven't already. Please contact your care team if you have questions.     amitriptyline 10 MG tablet   Commonly known as:  ELAVIL   Stopped by:  Preeti Jasso MD           coenzyme Q-10 10 MG Caps   Stopped by:  Preeti Jasso MD           olopatadine 0.1 % ophthalmic solution   Commonly known as:  PATANOL   Stopped by:  " Preeti Jasso MD           omeprazole 40 MG capsule   Commonly known as:  priLOSEC   Stopped by:  Preeti Jasso MD           ondansetron 8 MG tablet   Commonly known as:  ZOFRAN   Stopped by:  Preeti Jasso MD           vitamin  s/Minerals Tabs   Stopped by:  Preeti Jasso MD           ZOFRAN ODT PO   Stopped by:  Preeti Jasso MD           zolpidem 10 MG tablet   Commonly known as:  AMBIEN   Stopped by:  Preeti Jasso MD                    Primary Care Provider Office Phone # Fax #    Checo Crockett [phone]       [facility] [place] [address] [place] [address]        Equal Access to Services     [facility]: Hadii lesia ku hadasho Soomaali, waaxda luqadaha, qaybta kaalmada adeegyada, waxay idiin hayaan li brown . So [facility] [phone].    ATENCIÓN: Si habla español, tiene a jasso disposición servicios gratuitos de asistencia lingüística. [facility] [phone].    We comply with applicable federal civil rights laws and [place] laws. We do not discriminate on the basis of race, color, national origin, age, disability sex, sexual orientation or gender identity.            Thank you!     Thank you for choosing [facility]  for your care. Our goal is always to provide you with excellent care. Hearing back from our patients is one way we can continue to improve our services. Please take a few minutes to complete the written survey that you may receive in the mail after your visit with us. Thank you!             Your Updated Medication List - Protect others around you: Learn how to safely use, store and throw away your medicines at www.disposemymeds.org.          This list is accurate as of: 7/21/17  8:56 AM.  Always use your most recent med list.                   Brand Name Dispense Instructions for use Diagnosis    anastrozole 1 MG tablet    ARIMIDEX    90 tablet    Take 1  tablet (1 mg) by mouth daily    Malignant neoplasm of overlapping sites of left female breast (H)       B Complex-C Tabs           CALCIUM + D3 PO           calcium carbonate 500 MG chewable tablet    TUMS     Take 1 chew tab by mouth daily Reported on 4/21/2017        diphenoxylate-atropine 2.5-0.025 MG per tablet    LOMOTIL     Take 1 tablet by mouth 4 times daily as needed for diarrhea        LORazepam 0.5 MG tablet    ATIVAN    30 tablet    Take 1 tablet (0.5 mg) by mouth every 4 hours as needed (Anxiety, Nausea/Vomiting or Sleep)    Malignant neoplasm of overlapping sites of left female breast (H)       traMADol 50 MG tablet    ULTRAM    60 tablet    Take 1 tablet (50 mg) by mouth every 6 hours as needed for pain (maximum 8 tablets per day)    Malignant neoplasm of overlapping sites of left female breast (H)

## 2017-07-21 NOTE — PROGRESS NOTES
"Oncology Rooming Note    July 21, 2017 7:57 AM   Kita Smith is a 56 year old female who presents for:    Chief Complaint   Patient presents with     Oncology Clinic Visit     Breast cancer, female      Initial Vitals: /75 (BP Location: Right arm, Patient Position: Sitting, Cuff Size: Adult Regular)  Pulse 85  Temp 98.2  F (36.8  C) (Oral)  Resp 16  Wt 57.3 kg (126 lb 6.4 oz)  LMP 12/01/2006  SpO2 96%  BMI 20.4 kg/m2 Estimated body mass index is 20.4 kg/(m^2) as calculated from the following:    Height as of 6/21/17: 1.676 m (5' 6\").    Weight as of this encounter: 57.3 kg (126 lb 6.4 oz). Body surface area is 1.63 meters squared.  No Pain (0) Comment: Data Unavailable   Patient's last menstrual period was 12/01/2006.  Allergies reviewed: Yes  Medications reviewed: Yes    Medications: Medication refills not needed today.  Pharmacy name entered into Kids Note:    [facility] [address] [facility] [id number] [facility] [address] AT [facility] & [address] [facility] [address]    Clinical concerns: None                  5 minutes for nursing intake (face to face time)     Kajal Bailey MA    DISCHARGE PLAN:  Next appointments: See patient instruction section.  Future appointments made by NATHANAEL Rdz  Departure Mode: Ambulatory  Accompanied by: self  5 minutes for nursing discharge (face to face time)   Kajal Bailey MA      Return to clinic in 3 months with lab same day    10/20/2017 7:30 am Peripheral lab draw     10/20/2017 8:00 am Dr. Jasso  "

## 2017-07-21 NOTE — PROGRESS NOTES
Medical Assistant Note:  Kita Smith presents today for lab visit.    Patient seen by provider today: Yes: Dr. Jasso.   present during visit today: Not Applicable.    Concerns: No Concerns.    Procedure:  Lab draw site: RAC, Needle type: BF, Gauge: 21 g gauze and coban applied.    Post Assessment:  Labs drawn without difficulty: Yes.    Discharge Plan:  Departure Mode: Ambulatory.    Face to Face Time: 5.    Kajal Bailey MA

## 2017-07-21 NOTE — PATIENT INSTRUCTIONS
Return to clinic in 3 months with lab same day    10/20/2017 7:30 am Peripheral lab draw     10/20/2017 8:00 am Dr. Jasso

## 2017-10-20 ENCOUNTER — ONCOLOGY VISIT (OUTPATIENT)
Dept: ONCOLOGY | Facility: CLINIC | Age: 57
End: 2017-10-20
Attending: INTERNAL MEDICINE
Payer: COMMERCIAL

## 2017-10-20 ENCOUNTER — HOSPITAL ENCOUNTER (OUTPATIENT)
Facility: CLINIC | Age: 57
Setting detail: SPECIMEN
Discharge: HOME | End: 2017-10-20
Attending: INTERNAL MEDICINE | Admitting: INTERNAL MEDICINE
Payer: COMMERCIAL

## 2017-10-20 VITALS
BODY MASS INDEX: 20.5 KG/M2 | SYSTOLIC BLOOD PRESSURE: 119 MMHG | OXYGEN SATURATION: 96 % | TEMPERATURE: 98.5 F | DIASTOLIC BLOOD PRESSURE: 85 MMHG | WEIGHT: 127 LBS | HEART RATE: 80 BPM

## 2017-10-20 DIAGNOSIS — Z23 NEEDS FLU SHOT: Primary | ICD-10-CM

## 2017-10-20 DIAGNOSIS — Z17.0 MALIGNANT NEOPLASM OF OVERLAPPING SITES OF LEFT BREAST IN FEMALE, ESTROGEN RECEPTOR POSITIVE (H): ICD-10-CM

## 2017-10-20 DIAGNOSIS — C50.812 MALIGNANT NEOPLASM OF OVERLAPPING SITES OF LEFT BREAST IN FEMALE, ESTROGEN RECEPTOR POSITIVE (H): ICD-10-CM

## 2017-10-20 LAB
BASOPHILS # BLD AUTO: 0.1 10E9/L (ref 0–0.2)
BASOPHILS NFR BLD AUTO: 1.2 %
DIFFERENTIAL METHOD BLD: NORMAL
EOSINOPHIL # BLD AUTO: 0.1 10E9/L (ref 0–0.7)
EOSINOPHIL NFR BLD AUTO: 1.2 %
ERYTHROCYTE [DISTWIDTH] IN BLOOD BY AUTOMATED COUNT: 13.1 % (ref 10–15)
HCT VFR BLD AUTO: 41.6 % (ref 35–47)
HGB BLD-MCNC: 14.7 G/DL (ref 11.7–15.7)
IMM GRANULOCYTES # BLD: 0 10E9/L (ref 0–0.4)
IMM GRANULOCYTES NFR BLD: 0 %
LYMPHOCYTES # BLD AUTO: 1.3 10E9/L (ref 0.8–5.3)
LYMPHOCYTES NFR BLD AUTO: 30.1 %
MCH RBC QN AUTO: 32.3 PG (ref 26.5–33)
MCHC RBC AUTO-ENTMCNC: 35.3 G/DL (ref 31.5–36.5)
MCV RBC AUTO: 91 FL (ref 78–100)
MONOCYTES # BLD AUTO: 0.4 10E9/L (ref 0–1.3)
MONOCYTES NFR BLD AUTO: 9.1 %
NEUTROPHILS # BLD AUTO: 2.5 10E9/L (ref 1.6–8.3)
NEUTROPHILS NFR BLD AUTO: 58.4 %
NRBC # BLD AUTO: 0 10*3/UL
NRBC BLD AUTO-RTO: 0 /100
PLATELET # BLD AUTO: 204 10E9/L (ref 150–450)
RBC # BLD AUTO: 4.55 10E12/L (ref 3.8–5.2)
WBC # BLD AUTO: 4.2 10E9/L (ref 4–11)

## 2017-10-20 PROCEDURE — G0008 ADMIN INFLUENZA VIRUS VAC: HCPCS

## 2017-10-20 PROCEDURE — 36415 COLL VENOUS BLD VENIPUNCTURE: CPT

## 2017-10-20 PROCEDURE — 25000128 H RX IP 250 OP 636: Performed by: INTERNAL MEDICINE

## 2017-10-20 PROCEDURE — 85025 COMPLETE CBC W/AUTO DIFF WBC: CPT | Performed by: INTERNAL MEDICINE

## 2017-10-20 PROCEDURE — 99215 OFFICE O/P EST HI 40 MIN: CPT | Performed by: INTERNAL MEDICINE

## 2017-10-20 PROCEDURE — 90686 IIV4 VACC NO PRSV 0.5 ML IM: CPT | Performed by: INTERNAL MEDICINE

## 2017-10-20 PROCEDURE — 99211 OFF/OP EST MAY X REQ PHY/QHP: CPT

## 2017-10-20 RX ORDER — EXEMESTANE 25 MG/1
25 TABLET ORAL DAILY
Qty: 90 TABLET | Refills: 3 | Status: SHIPPED | OUTPATIENT
Start: 2017-10-20 | End: 2018-10-01

## 2017-10-20 RX ADMIN — INFLUENZA A VIRUS A/MICHIGAN/45/2015 X-275 (H1N1) ANTIGEN (FORMALDEHYDE INACTIVATED), INFLUENZA A VIRUS A/HONG KONG/4801/2014 X-263B (H3N2) ANTIGEN (FORMALDEHYDE INACTIVATED), INFLUENZA B VIRUS B/PHUKET/3073/2013 ANTIGEN (FORMALDEHYDE INACTIVATED), AND INFLUENZA B VIRUS B/BRISBANE/60/2008 ANTIGEN (FORMALDEHYDE INACTIVATED) 0.5 ML: 15; 15; 15; 15 INJECTION, SUSPENSION INTRAMUSCULAR at 08:28

## 2017-10-20 ASSESSMENT — PAIN SCALES - GENERAL: PAINLEVEL: MODERATE PAIN (4)

## 2017-10-20 NOTE — MR AVS SNAPSHOT
"              After Visit Summary   10/20/2017    Kita Smith    MRN: [id number]           Patient Information     Date Of Birth          1960        Visit Information        Provider Department      10/20/2017 7:30 AM Nurse, [facility]        Today's Diagnoses     Malignant neoplasm of overlapping sites of left breast in female, estrogen receptor positive (H)           Follow-ups after your visit        Your next 10 appointments already scheduled     Oct 20, 2017  8:00 AM CDT   Return Visit with Preeti Jasso MD   [facility] ([facility])    [facility]  [address]  [facility] [id number] [phone]              Who to contact     If you have questions or need follow up information about today's clinic visit or your schedule please contact [facility] directly at [phone].  Normal or non-critical lab and imaging results will be communicated to you by MyChart, letter or phone within 4 business days after the clinic has received the results. If you do not hear from us within 7 days, please contact the clinic through [employer] or phone. If you have a critical or abnormal lab result, we will notify you by phone as soon as possible.  Submit refill requests through [employer] or call your pharmacy and they will forward the refill request to us. Please allow 3 business days for your refill to be completed.          Additional Information About Your Visit        [employer] Information     [employer] lets you send messages to your doctor, view your test results, renew your prescriptions, schedule appointments and more. To sign up, go to [email or web address] . Click on \"Log in\" on the left side of the screen, which will take you to the Welcome page. Then click on \"Sign up Now\" on the right side of the page.     You will be asked to enter the access code listed below, as well as some personal information. " Please follow the directions to create your username and password.     Your access code is: [email or web address]  Expires: 2018  7:38 AM     Your access code will  in 90 days. If you need help or a new code, please call your [place] clinic or [phone].        Care EveryWhere ID     This is your Care EveryWhere ID. This could be used by other organizations to access your [place] medical records  [id number]        Your Vitals Were     Last Period                   2006            Blood Pressure from Last 3 Encounters:   17 114/75   17 163/83   17 107/73    Weight from Last 3 Encounters:   17 57.3 kg (126 lb 6.4 oz)   17 58.8 kg (129 lb 9.6 oz)   17 59 kg (130 lb)              We Performed the Following     CBC with platelets differential        Primary Care Provider Office Phone # Fax #    Checo Crockett [phone]       [facility] [place] [facility] [address] [place] [address]        Equal Access to Services     [facility]: Hadii aad ku hadasho Soomaali, waaxda luqadaha, qaybta kaalmada adeegyada, shayne poe haymeenan li brown . So [facility] [phone].    ATENCIÓN: Si habla español, tiene a jasso disposición servicios gratuitos de asistencia lingüística. [facility] [phone].    We comply with applicable federal civil rights laws and [place] laws. We do not discriminate on the basis of race, color, national origin, age, disability, sex, sexual orientation, or gender identity.            Thank you!     Thank you for choosing [facility]  for your care. Our goal is always to provide you with excellent care. Hearing back from our patients is one way we can continue to improve our services. Please take a few minutes to complete the written survey that you may receive in the mail after your visit with us. Thank you!             Your Updated Medication List - Protect others around you: Learn how to safely use,  store and throw away your medicines at www.disposemymeds.org.          This list is accurate as of: 10/20/17  7:39 AM.  Always use your most recent med list.                   Brand Name Dispense Instructions for use Diagnosis    anastrozole 1 MG tablet    ARIMIDEX    90 tablet    Take 1 tablet (1 mg) by mouth daily    Malignant neoplasm of overlapping sites of left female breast (H)       B Complex-C Tabs           CALCIUM + D3 PO           calcium carbonate 500 MG chewable tablet    TUMS     Take 1 chew tab by mouth daily Reported on 4/21/2017        diphenoxylate-atropine 2.5-0.025 MG per tablet    LOMOTIL     Take 1 tablet by mouth 4 times daily as needed for diarrhea        LORazepam 0.5 MG tablet    ATIVAN    30 tablet    Take 1 tablet (0.5 mg) by mouth every 4 hours as needed (Anxiety, Nausea/Vomiting or Sleep)    Malignant neoplasm of overlapping sites of left female breast (H)       traMADol 50 MG tablet    ULTRAM    60 tablet    Take 1 tablet (50 mg) by mouth every 6 hours as needed for pain (maximum 8 tablets per day)    Malignant neoplasm of overlapping sites of left female breast (H)

## 2017-10-20 NOTE — LETTER
10/20/2017        RE: Kita Smith  [address] [facility] [address]        [facility]    Hematology/Oncology Established Patient Follow-up Note      Today's Date: 10/20/2017    Reason for Follow-up: history of breast cancer and melanoma    HISTORY OF PRESENT ILLNESS: Kita Smith is a 56 year old female who is being followed for adenocarcinoma of the breast as well as melanoma. She was previously followed by Dr. Minor.  Kita is known to be BRCA-2 positive. She has undergone bilateral mastectomy.  She has also undergone hysterectomy and bilateral salpingo-oophorectomy on 04/06/2013 for prophylaxis of ovarian and uterine cancer.     She first presented with multifocal lesions in the left breast in 2006. She underwent left mastectomy on 01/28/2006 demonstrating multifocal infiltrating ductal adenocarcinoma, grade 2, with DCIS. She had 3 separate lesions measuring 1.5 cm, 0.5 cm, and 0.4 cm. All lymph nodes were negative. The tumor was ER/NJ positive and HER-2 negative. She received 4 cycles of dose-dense Adriamycin and Cytoxan and was then on tamoxifen through 2012 when it was discontinued.     In the summer of 2012, Kita noted a hyperpigmented lesion over the right neck which was rapidly growing and changing over 1-2 months. The lesion was excised by Dr. Nataliia Baron. The lesion was a Santiago level III melanoma Breslow depth 0.5 mm.   Stage IA.  A wide excision was subsequently performed by Dr. Nataliia Baron, with 1 cm margins and no evidence of residual disease. The patient was subsequently referred to Dr. Childers. A PET-CT showed no evidence of metastatic disease. 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 Bazan of [facility] in March 2016.  This was stage 0.    On a CT scan study done on 10/12/2010 she was noted to have multiple bilateral pulmonary nodules, which were felt to  be granulomatous. These have been followed; these were PET negative. Her most recent CT of the chest done 04/05/2013 showed that the tiny pulmonary nodules were stable over 3 years and therefore these are no longer followed on a routine basis.      PET-CT in June 2016 showed left axillary lymphadenopathy and biopsy of lymph node showed metastatic breast carcinoma, consistent with recurrence.  On 6/27/16, she underwent left lateral breast lesion excision and left axillary dissection.  Pathology showed invasive ductal carcinoma, grade 3, tumor size 1.5 cm +LVI, 6 of 31 lymph nodes were involved, ER positive (75%), GA positive (5%), HER-2 negative.  4 of 29 lymph node were positive in the axillary dissection.  There was excision of a second breast nodule that also had metastatic breast cancer.      She began chemotherapy on 8/11/16, and completed docetaxel+carboplatin x 6 cycles on 11/25/16.  She completed radiation in late February 2017.      Port was removed on 3/16/17.    She started anastrozole on 3/16/17.      INTERIM HISTORY:  Kita comes in for follow-up today.   She says that she has aches all over, in her elbows and knees.  She also notes fatigue.  She says that she has not been exercising as much and stopping doing physical therapy.  She has switched to a vegetarian/vegan diet.        REVIEW OF SYSTEMS:   14 point ROS was reviewed and is negative other than as noted above in HPI.       HOME MEDICATIONS:  Current Outpatient Prescriptions   Medication Sig Dispense Refill     exemestane (AROMASIN) 25 MG tablet Take 1 tablet (25 mg) by mouth daily 90 tablet 3     diphenoxylate-atropine (LOMOTIL) 2.5-0.025 MG per tablet Take 1 tablet by mouth 4 times daily as needed for diarrhea       traMADol (ULTRAM) 50 MG tablet Take 1 tablet (50 mg) by mouth every 6 hours as needed for pain (maximum 8 tablets per day) 60 tablet 1     Calcium Carb-Cholecalciferol (CALCIUM + D3 PO)        B Complex-C TABS        calcium  carbonate (TUMS) 500 MG chewable tablet Take 1 chew tab by mouth daily Reported on 2017       LORazepam (ATIVAN) 0.5 MG tablet Take 1 tablet (0.5 mg) by mouth every 4 hours as needed (Anxiety, Nausea/Vomiting or Sleep) 30 tablet 2         ALLERGIES:  Allergies   Allergen Reactions     Percocet [Oxycodone-Acetaminophen] Nausea     Adhesive Tape Blisters     REDNESS,  bandaides     Hydrocodone Nausea and Vomiting     Latex      Wound Dressing Adhesive          PAST MEDICAL HISTORY:  Past Medical History:   Diagnosis Date     Basal cell carcinoma      Breast CA (H)      Depression with anxiety      Histoplasmosis      Malignant melanoma (H)      PONV (postoperative nausea and vomiting)          PAST SURGICAL HISTORY:  Past Surgical History:   Procedure Laterality Date     BIOPSY OF SKIN LESION       BREAST SURGERY      bilat mastectomy       C RAD RESEC TONSIL/PILLARS        SECTION      times 2     CL AFF SURGICAL PATHOLOGY      left wrist     DAVINCI HYSTERECTOMY TOTAL, BILATERAL SALPINGO-OOPHORECTOMY, COMBINED  2013    Procedure: COMBINED DAVINCI HYSTERECTOMY TOTAL, SALPINGO-OOPHORECTOMY;  ROBOTIC ASSISTED TOTAL LAPAROSCOPIC HYSTERECTOMY, BILATERAL SALPINGO OOPHORECTOMY, PARTIAL VULVECTOMY ;  Surgeon: Hira Jenkins MD;  Location:  OR     DISSECT LYMPH NODE AXILLA Left 2016    Procedure: DISSECT LYMPH NODE AXILLA;  Surgeon: Hebert Driscoll MD;  Location:  OR     GENERAL SURGERY                           DATE:   &    C section     HC REVISE BREAST RECONSTRUCTION       HEMORRHOIDECTOMY       INSERT PORT VASCULAR ACCESS Right 2016    Procedure: INSERT PORT VASCULAR ACCESS;  Surgeon: Hebert Driscoll MD;  Location:  OR     MASTECTOMY       melanoma removal[      right sided neck     MOHS MICROGRAPHIC PROCEDURE       PROCTOPLASTY  2013    Procedure: PROCTOPLASTY;  PROCTOPLASTY TO REPAIR ANAL FISSURE AND STENOSIS;  Surgeon: Goldberg, Stanley  MD Rafa;  Location: [facility]     REMOVE PORT VASCULAR ACCESS N/A 3/16/2017    Procedure: REMOVE PORT VASCULAR ACCESS;  Surgeon: Hebert Driscoll MD;  Location: [facility]     VULVECTOMY SIMPLE  2013    Procedure: VULVECTOMY SIMPLE;;  Surgeon: Hira Jenkins MD;  Location:  OR         SOCIAL HISTORY:  Social History     Social History     Marital status:      Spouse name: N/A     Number of children: N/A     Years of education: N/A     Occupational History     Not on file.     Social History Main Topics     Smoking status: Former Smoker     Packs/day: 1.50     Years: 4.00     Types: Cigarettes     Quit date: 1980     Smokeless tobacco: Never Used     Alcohol use Yes      Comment: occasionally     Drug use: No     Sexual activity: Not on file     Other Topics Concern     Not on file     Social History Narrative         FAMILY HISTORY:  Family History   Problem Relation Age of Onset     CANCER Mother      brain     Other Cancer Mother      Cancer - colorectal Father      Hypertension Father      Colon Cancer Father      Other Cancer Sister      CANCER Maternal Aunt      breast     CANCER Other      breast in cousin     Other Cancer Maternal Grandfather      Breast Cancer Paternal Grandmother      Other Cancer Paternal Grandfather          PHYSICAL EXAM:  Vital signs:  /85 (BP Location: Right arm, Patient Position: Chair, Cuff Size: Adult Regular)  Pulse 80  Temp 98.5  F (36.9  C) (Oral)  Wt 57.6 kg (127 lb)  LMP 2006  SpO2 96%  BMI 20.5 kg/m2   ECO  GENERAL/CONSTITUTIONAL: No acute distress.  EYES: No scleral icterus.  LYMPH: No anterior cervical, posterior cervical, supraclavicular, or axillary adenopathy.   RESPIRATORY: Clear to auscultation bilaterally. No crackles or wheezing.   CARDIOVASCULAR: Regular rate and rhythm without murmurs, gallops, or rubs.  GASTROINTESTINAL: No tenderness. The patient has normal bowel sounds. No guarding.  No distention.  BREAST: s/p  bilateral mastectomy with implants in place.  No palpable chest wall masses.  MUSCULOSKELETAL: Warm and well-perfused.   NEUROLOGIC: Alert, oriented, answers questions appropriately.  INTEGUMENTARY: No jaundice.  Numerous scattered moles.    GAIT: Steady, does not use assistive device  Port has been removed.        LABS:  CBC RESULTS:   Recent Labs   Lab Test  10/20/17   0730   WBC  4.2   RBC  4.55   HGB  14.7   HCT  41.6   MCV  91   MCH  32.3   MCHC  35.3   RDW  13.1   PLT  204           ASSESSMENT/PLAN:  Kita Smith is a 56 year old female:    1) Left breast cancer: diagnosed in 2006, s/p bilateral mastectomy and chemotherapy and hormone therapy.  She is known to be BRCA-2 positive and has undergone hysterectomy and bilateral salpingo-oophorectomy on 04/06/2013 for prophylaxis of ovarian and uterine cancer.  Then, she had recurrence, s/p left lateral breast lesion excision and left axillary dissection on 6/27/16.  Pathology showed invasive ductal carcinoma, grade 3, tumor size 1.5 cm +LVI, 6 of 31 lymph nodes were involved, ER positive (75%), FL positive (5%), HER-2 negative.  4 of 29 lymph node were positive in the axillary dissection.  There was excision of a second breast nodule that also had metastatic breast cancer.      She has completed chemotherapy and radiation.  She started anastrozole on 3/7/17.    She had a PET-CT done 7/17/17 that showed no evidence of disease.  There are stable lung nodules and a left hepatic dome cyst.    She is having arthralgias, likely from anastrozole.  We discussed switching to exemestane, and she is agreeable to trying that.      -stop anastrozole and switch to exemestane 25 mg daily - prescription sent to pharmacy  -Kita wants routine imaging.  With her recurrent cancer and high-risk BRCA, it would be reasonable to obtain imaging.  She initially wanted annual PET-CT's, but after thinking about things and considering the risks, she would like to have them every 3  years.  -patient okay with no tumor markers though, since that was normal throughout her recurrence and is likely not useful.  She requests to follow CBC and CMP.  -RTC in 3 months with labs     2) Left arm and back pain: started after surgery, and worsened on chemotherapy and radiation.  She stopped doing physical therapy, and she feels it getting tight again.  -she will resume physical therapy exercises at home  -she takes tramadol occasionally.  She want to hold off on new prescription for now, and call in December if she needs it.  She is hoping switching to exemestane will help with the arthralgias.    3) Melanoma: She has history of malignant melanoma x 2, stage IA of the right neck and stage 0 of the left temple.    -she follows every 6 months with dermatologist close to her home    4) Pulmonary nodules: were found in 2010 that were thought granulomatous and PET negative at the time, and have been stable on subsequent scans.     5) Reflux:  -she tried omeprazole     6) Osteopenia: osteopenia of both hips and mild osteopenia of the lumbar spine  -take calcium and vitamin D  -repeat DEXA scan in March 2019    7) Leukopenia: May be from chemotherapy and radiation. CBC is normal today, continue to follow.  -will repeat CBC in 3 months when she returns    8) She will get flu shot today.      I spent a total of 25 minutes with the patient, with over >50% of the time in counseling and/or coordination of care.      Preeti Jasso MD  Hematology/Oncology  [facility]          Oncology Rooming Note    October 20, 2017 7:58 AM   Kita Smith is a 56 year old female who presents for:    Chief Complaint   Patient presents with     Oncology Clinic Visit     Initial Vitals: /85 (BP Location: Right arm, Patient Position: Chair, Cuff Size: Adult Regular)  Pulse 80  Temp 98.5  F (36.9  C) (Oral)  Wt 57.6 kg (127 lb)  LMP 12/01/2006  SpO2 96%  BMI 20.5 kg/m2 Estimated body mass index  "is 20.5 kg/(m^2) as calculated from the following:    Height as of 6/21/17: 1.676 m (5' 6\").    Weight as of this encounter: 57.6 kg (127 lb). Body surface area is 1.64 meters squared.  Moderate Pain (4) Comment: elbow, knees, shoulders, headache   Patient's last menstrual period was 12/01/2006.  Allergies reviewed: Yes  Medications reviewed: Yes    Medications: MEDICATION REFILLS NEEDED TODAY. Provider was notified. TRAMADOL REFILL NEEDED TODAY.  Pharmacy name entered into [employer]:    [facility] [id number] [place] [address] [address] [facility]    Clinical concerns: None     5 minutes for nursing intake (face to face time)     Damari Monzon CMA                  Sincerely,        Preeti Jasso MD    "

## 2017-10-20 NOTE — PROGRESS NOTES
"Oncology Rooming Note    October 20, 2017 7:58 AM   Kita Smith is a 56 year old female who presents for:    Chief Complaint   Patient presents with     Oncology Clinic Visit     Initial Vitals: /85 (BP Location: Right arm, Patient Position: Chair, Cuff Size: Adult Regular)  Pulse 80  Temp 98.5  F (36.9  C) (Oral)  Wt 57.6 kg (127 lb)  LMP 12/01/2006  SpO2 96%  BMI 20.5 kg/m2 Estimated body mass index is 20.5 kg/(m^2) as calculated from the following:    Height as of 6/21/17: 1.676 m (5' 6\").    Weight as of this encounter: 57.6 kg (127 lb). Body surface area is 1.64 meters squared.  Moderate Pain (4) Comment: elbow, knees, shoulders, headache   Patient's last menstrual period was 12/01/2006.  Allergies reviewed: Yes  Medications reviewed: Yes    Medications: MEDICATION REFILLS NEEDED TODAY. Provider was notified. TRAMADOL REFILL NEEDED TODAY.  Pharmacy name entered into [employer]:    [facility] [address] [place] [address] [address] AT [facility]    Clinical concerns: None     5 minutes for nursing intake (face to face time)     Damari Monzon CMA            Injectable Influenza Immunization Documentation    1.  Has the patient received the information for the injectable influenza vaccine? YES     2. Is the patient 6 months of age or older? YES     3. Does the patient have any of the following contraindications?         Severe allergy to eggs?  No     Severe allergic reaction to previous influenza vaccines?  No   Severe allergy to latex?  No       History of Guillain-[place] syndrome?  No     Currently have a temperature greater than 100.4F?  No        4.  Severely egg allergic patients should have flu vaccine eligibility assessed by an MD, RN, or pharmacist, and those who received flu vaccine should be observed for 15 min by an MD, RN, Pharmacist, Medical Technician, or member of clinic staff.\": YES    5. Latex-allergic patients should be given latex-free " influenza vaccine Yes. Please reference the Vaccine latex table to determine if your clinic s product is latex-containing.       Vaccination given by Damari Monzon MA            DISCHARGE PLAN:  Next appointments: See patient instruction section. Pt instructions reviewed with pt. Flu shot given by Damari. Pt brought to [facility] to schedule lab and follow up.  Departure Mode: Ambulatory  Accompanied by: self  5 minutes for nursing discharge (face to face time)   Damari Monzon CMA          -flu shot today--given  -stop anastrozole  -start exemestane - prescription sent to your pharmacy  -return to clinic in 3 months with labs a day prior  Scheduled/janice    AVS printed & given to patient/eloisa      1/26/2018 Fri  8:00 AM  8:00 A 15 [facility] [[id number]]  ONCOLOGY NURSE [[id number]] RETURN [657] Peripheral Lab Draw       2/2/2018 Fri  8:00 AM  8:00 A 30 [facility] [[id number]] RYAN BECERRIL [[id number]] RETURN [657] 3 month follow up Breast cancer

## 2017-10-20 NOTE — PROGRESS NOTES
Medical Assistant Note:  Kita Smith presents today for labs.    Patient seen by provider today: No.   present during visit today: Not Applicable.    Concerns: No Concerns.    Procedure:  Lab draw site: RAC, Needle type: BF, Gauge: 21.    Post Assessment:  Labs drawn without difficulty: Yes.    Discharge Plan:  Departure Mode: Ambulatory.    Face to Face Time: 5 minutes.    Damari Monzon MA

## 2017-10-20 NOTE — PROGRESS NOTES
[facility]    Hematology/Oncology Established Patient Follow-up Note      Today's Date: 10/20/2017    Reason for Follow-up: history of breast cancer and melanoma    HISTORY OF PRESENT ILLNESS: Kita Smith is a 56 year old female who is being followed for adenocarcinoma of the breast as well as melanoma. She was previously followed by Dr. Minor.  Kita is known to be BRCA-2 positive. She has undergone bilateral mastectomy.  She has also undergone hysterectomy and bilateral salpingo-oophorectomy on 04/06/2013 for prophylaxis of ovarian and uterine cancer.     She first presented with multifocal lesions in the left breast in 2006. She underwent left mastectomy on 01/28/2006 demonstrating multifocal infiltrating ductal adenocarcinoma, grade 2, with DCIS. She had 3 separate lesions measuring 1.5 cm, 0.5 cm, and 0.4 cm. All lymph nodes were negative. The tumor was ER/WY positive and HER-2 negative. She received 4 cycles of dose-dense Adriamycin and Cytoxan and was then on tamoxifen through 2012 when it was discontinued.     In the summer of 2012, Kita noted a hyperpigmented lesion over the right neck which was rapidly growing and changing over 1-2 months. The lesion was excised by Dr. Nataliia Baron. The lesion was a Santiago level III melanoma Breslow depth 0.5 mm.   Stage IA.  A wide excision was subsequently performed by Dr. Nataliia Baron, with 1 cm margins and no evidence of residual disease. The patient was subsequently referred to Dr. Childers. A PET-CT showed no evidence of metastatic disease. 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 Bazan of [facility] in March 2016.  This was stage 0.    On a CT scan study done on 10/12/2010 she was noted to have multiple bilateral pulmonary nodules, which were felt to be granulomatous. These have been followed; these were PET negative. Her most recent CT of the chest  done 04/05/2013 showed that the tiny pulmonary nodules were stable over 3 years and therefore these are no longer followed on a routine basis.      PET-CT in June 2016 showed left axillary lymphadenopathy and biopsy of lymph node showed metastatic breast carcinoma, consistent with recurrence.  On 6/27/16, she underwent left lateral breast lesion excision and left axillary dissection.  Pathology showed invasive ductal carcinoma, grade 3, tumor size 1.5 cm +LVI, 6 of 31 lymph nodes were involved, ER positive (75%), LA positive (5%), HER-2 negative.  4 of 29 lymph node were positive in the axillary dissection.  There was excision of a second breast nodule that also had metastatic breast cancer.      She began chemotherapy on 8/11/16, and completed docetaxel+carboplatin x 6 cycles on 11/25/16.  She completed radiation in late February 2017.      Port was removed on 3/16/17.    She started anastrozole on 3/16/17.      INTERIM HISTORY:  Kita comes in for follow-up today.   She says that she has aches all over, in her elbows and knees.  She also notes fatigue.  She says that she has not been exercising as much and stopping doing physical therapy.  She has switched to a vegetarian/vegan diet.        REVIEW OF SYSTEMS:   14 point ROS was reviewed and is negative other than as noted above in HPI.       HOME MEDICATIONS:  Current Outpatient Prescriptions   Medication Sig Dispense Refill     exemestane (AROMASIN) 25 MG tablet Take 1 tablet (25 mg) by mouth daily 90 tablet 3     diphenoxylate-atropine (LOMOTIL) 2.5-0.025 MG per tablet Take 1 tablet by mouth 4 times daily as needed for diarrhea       traMADol (ULTRAM) 50 MG tablet Take 1 tablet (50 mg) by mouth every 6 hours as needed for pain (maximum 8 tablets per day) 60 tablet 1     Calcium Carb-Cholecalciferol (CALCIUM + D3 PO)        B Complex-C TABS        calcium carbonate (TUMS) 500 MG chewable tablet Take 1 chew tab by mouth daily Reported on 4/21/2017       LORazepam  (ATIVAN) 0.5 MG tablet Take 1 tablet (0.5 mg) by mouth every 4 hours as needed (Anxiety, Nausea/Vomiting or Sleep) 30 tablet 2         ALLERGIES:  Allergies   Allergen Reactions     Percocet [Oxycodone-Acetaminophen] Nausea     Adhesive Tape Blisters     REDNESS,  bandaides     Hydrocodone Nausea and Vomiting     Latex      Wound Dressing Adhesive          PAST MEDICAL HISTORY:  Past Medical History:   Diagnosis Date     Basal cell carcinoma      Breast CA (H)      Depression with anxiety      Histoplasmosis      Malignant melanoma (H)      PONV (postoperative nausea and vomiting)          PAST SURGICAL HISTORY:  Past Surgical History:   Procedure Laterality Date     BIOPSY OF SKIN LESION       BREAST SURGERY      bilat mastectomy       C RAD RESEC TONSIL/PILLARS        SECTION      times 2     CL AFF SURGICAL PATHOLOGY      left wrist     DAVINCI HYSTERECTOMY TOTAL, BILATERAL SALPINGO-OOPHORECTOMY, COMBINED  2013    Procedure: COMBINED DAVINCI HYSTERECTOMY TOTAL, SALPINGO-OOPHORECTOMY;  ROBOTIC ASSISTED TOTAL LAPAROSCOPIC HYSTERECTOMY, BILATERAL SALPINGO OOPHORECTOMY, PARTIAL VULVECTOMY ;  Surgeon: Hira Jenkins MD;  Location:  OR     DISSECT LYMPH NODE AXILLA Left 2016    Procedure: DISSECT LYMPH NODE AXILLA;  Surgeon: Hebert Driscoll MD;  Location:  OR     GENERAL SURGERY                           DATE:   &    C section     HC REVISE BREAST RECONSTRUCTION       HEMORRHOIDECTOMY       INSERT PORT VASCULAR ACCESS Right 2016    Procedure: INSERT PORT VASCULAR ACCESS;  Surgeon: Hebert Driscoll MD;  Location:  OR     MASTECTOMY       melanoma removal[      right sided neck     MOHS MICROGRAPHIC PROCEDURE       PROCTOPLASTY  2013    Procedure: PROCTOPLASTY;  PROCTOPLASTY TO REPAIR ANAL FISSURE AND STENOSIS;  Surgeon: Goldberg, Stanley Morton, MD;  Location:  [facility]     REMOVE PORT VASCULAR ACCESS N/A 3/16/2017    Procedure: REMOVE PORT VASCULAR  ACCESS;  Surgeon: Hebert Driscoll MD;  Location: [facility]     VULVECTOMY SIMPLE  2013    Procedure: VULVECTOMY SIMPLE;;  Surgeon: Hira Jenkins MD;  Location:  OR         SOCIAL HISTORY:  Social History     Social History     Marital status:      Spouse name: N/A     Number of children: N/A     Years of education: N/A     Occupational History     Not on file.     Social History Main Topics     Smoking status: Former Smoker     Packs/day: 1.50     Years: 4.00     Types: Cigarettes     Quit date: 1980     Smokeless tobacco: Never Used     Alcohol use Yes      Comment: occasionally     Drug use: No     Sexual activity: Not on file     Other Topics Concern     Not on file     Social History Narrative         FAMILY HISTORY:  Family History   Problem Relation Age of Onset     CANCER Mother      brain     Other Cancer Mother      Cancer - colorectal Father      Hypertension Father      Colon Cancer Father      Other Cancer Sister      CANCER Maternal Aunt      breast     CANCER Other      breast in cousin     Other Cancer Maternal Grandfather      Breast Cancer Paternal Grandmother      Other Cancer Paternal Grandfather          PHYSICAL EXAM:  Vital signs:  /85 (BP Location: Right arm, Patient Position: Chair, Cuff Size: Adult Regular)  Pulse 80  Temp 98.5  F (36.9  C) (Oral)  Wt 57.6 kg (127 lb)  LMP 2006  SpO2 96%  BMI 20.5 kg/m2   ECO  GENERAL/CONSTITUTIONAL: No acute distress.  EYES: No scleral icterus.  LYMPH: No anterior cervical, posterior cervical, supraclavicular, or axillary adenopathy.   RESPIRATORY: Clear to auscultation bilaterally. No crackles or wheezing.   CARDIOVASCULAR: Regular rate and rhythm without murmurs, gallops, or rubs.  GASTROINTESTINAL: No tenderness. The patient has normal bowel sounds. No guarding.  No distention.  BREAST: s/p bilateral mastectomy with implants in place.  No palpable chest wall masses.  MUSCULOSKELETAL: Warm and  well-perfused.   NEUROLOGIC: Alert, oriented, answers questions appropriately.  INTEGUMENTARY: No jaundice.  Numerous scattered moles.    GAIT: Steady, does not use assistive device  Port has been removed.        LABS:  CBC RESULTS:   Recent Labs   Lab Test  10/20/17   0730   WBC  4.2   RBC  4.55   HGB  14.7   HCT  41.6   MCV  91   MCH  32.3   MCHC  35.3   RDW  13.1   PLT  204           ASSESSMENT/PLAN:  Kita Smith is a 56 year old female:    1) Left breast cancer: diagnosed in 2006, s/p bilateral mastectomy and chemotherapy and hormone therapy.  She is known to be BRCA-2 positive and has undergone hysterectomy and bilateral salpingo-oophorectomy on 04/06/2013 for prophylaxis of ovarian and uterine cancer.  Then, she had recurrence, s/p left lateral breast lesion excision and left axillary dissection on 6/27/16.  Pathology showed invasive ductal carcinoma, grade 3, tumor size 1.5 cm +LVI, 6 of 31 lymph nodes were involved, ER positive (75%), OH positive (5%), HER-2 negative.  4 of 29 lymph node were positive in the axillary dissection.  There was excision of a second breast nodule that also had metastatic breast cancer.      She has completed chemotherapy and radiation.  She started anastrozole on 3/7/17.    She had a PET-CT done 7/17/17 that showed no evidence of disease.  There are stable lung nodules and a left hepatic dome cyst.    She is having arthralgias, likely from anastrozole.  We discussed switching to exemestane, and she is agreeable to trying that.      -stop anastrozole and switch to exemestane 25 mg daily - prescription sent to pharmacy  -Kita wants routine imaging.  With her recurrent cancer and high-risk BRCA, it would be reasonable to obtain imaging.  She initially wanted annual PET-CT's, but after thinking about things and considering the risks, she would like to have them every 3 years.  -patient okay with no tumor markers though, since that was normal throughout her recurrence and is  likely not useful.  She requests to follow CBC and CMP.  -RTC in 3 months with labs     2) Left arm and back pain: started after surgery, and worsened on chemotherapy and radiation.  She stopped doing physical therapy, and she feels it getting tight again.  -she will resume physical therapy exercises at home  -she takes tramadol occasionally.  She want to hold off on new prescription for now, and call in December if she needs it.  She is hoping switching to exemestane will help with the arthralgias.    3) Melanoma: She has history of malignant melanoma x 2, stage IA of the right neck and stage 0 of the left temple.    -she follows every 6 months with dermatologist close to her home    4) Pulmonary nodules: were found in 2010 that were thought granulomatous and PET negative at the time, and have been stable on subsequent scans.     5) Reflux:  -she tried omeprazole     6) Osteopenia: osteopenia of both hips and mild osteopenia of the lumbar spine  -take calcium and vitamin D  -repeat DEXA scan in March 2019    7) Leukopenia: May be from chemotherapy and radiation. CBC is normal today, continue to follow.  -will repeat CBC in 3 months when she returns    8) She will get flu shot today.      I spent a total of 25 minutes with the patient, with over >50% of the time in counseling and/or coordination of care.      Preeti Jasso MD  Hematology/Oncology  [facility]

## 2017-10-20 NOTE — PATIENT INSTRUCTIONS
-flu shot today--given  -stop anastrozole  -start exemestane - prescription sent to your pharmacy  -return to clinic in 3 months with labs a day prior  Scheduled/eloisa    AVS printed & given to patient/eloisa

## 2017-10-20 NOTE — MR AVS SNAPSHOT
After Visit Summary   10/20/2017    Kita Smith    MRN: [id number]           Patient Information     Date Of Birth          1960        Visit Information        Provider Department      10/20/2017 8:00 AM Preeti Jasso MD [facility]        Today's Diagnoses     Needs flu shot    -  1    Malignant neoplasm of overlapping sites of left breast in female, estrogen receptor positive (H)          Care Instructions    -flu shot today  -stop anastrozole  -start exemestane - prescription sent to your pharmacy  -return to clinic in 3 months with labs a day prior          Follow-ups after your visit        Your next 10 appointments already scheduled     Jan 26, 2018  8:00 AM CST   Return Visit with  Oncology Nurse   [facility] ([facility])    [facility]  [address]  [place] [address]   [phone]            Feb 02, 2018  8:00 AM CST   Return Visit with Preeti Jasso MD   [facility] ([facility])    [facility] [place]  [address]  [place] [address]   [phone]              Future tests that were ordered for you today     Open Future Orders        Priority Expected Expires Ordered    CBC with platelets differential Routine 1/20/2018 10/20/2018 10/20/2017    Comprehensive metabolic panel Routine 1/20/2018 10/20/2018 10/20/2017            Who to contact     If you have questions or need follow up information about today's clinic visit or your schedule please contact [facility] directly at [phone].  Normal or non-critical lab and imaging results will be communicated to you by MyChart, letter or phone within 4 business days after the clinic has received the results. If you do not hear from us within 7 days, please contact the clinic through MyChart or phone. If you have a critical or abnormal lab result, we will notify you  "by phone as soon as possible.  Submit refill requests through [employer] or call your pharmacy and they will forward the refill request to us. Please allow 3 business days for your refill to be completed.          Additional Information About Your Visit        [email or web address] Information     [employer] lets you send messages to your doctor, view your test results, renew your prescriptions, schedule appointments and more. To sign up, go to [email or web address] . Click on \"Log in\" on the left side of the screen, which will take you to the Welcome page. Then click on \"Sign up Now\" on the right side of the page.     You will be asked to enter the access code listed below, as well as some personal information. Please follow the directions to create your username and password.     Your access code is: [email or web address]  Expires: 2018  7:38 AM     Your access code will  in 90 days. If you need help or a new code, please call your [place] clinic or [phone].        Care EveryWhere ID     This is your Care EveryWhere ID. This could be used by other organizations to access your [place] medical records  [id number]        Your Vitals Were     Pulse Temperature Last Period Pulse Oximetry BMI (Body Mass Index)       80 98.5  F (36.9  C) (Oral) 2006 96% 20.5 kg/m2        Blood Pressure from Last 3 Encounters:   10/20/17 119/85   17 114/75   17 163/83    Weight from Last 3 Encounters:   10/20/17 57.6 kg (127 lb)   17 57.3 kg (126 lb 6.4 oz)   17 58.8 kg (129 lb 9.6 oz)                 Today's Medication Changes          These changes are accurate as of: 10/20/17  8:44 AM.  If you have any questions, ask your nurse or doctor.               Start taking these medicines.        Dose/Directions    exemestane 25 MG tablet   Commonly known as:  AROMASIN   Used for:  Malignant neoplasm of overlapping sites of left breast in female, estrogen receptor positive (H)   Started by:  Preeti Jasso MD "        Dose:  25 mg   Take 1 tablet (25 mg) by mouth daily   Quantity:  90 tablet   Refills:  3         Stop taking these medicines if you haven't already. Please contact your care team if you have questions.     anastrozole 1 MG tablet   Commonly known as:  ARIMIDEX   Stopped by:  Preeti Jasso MD                Where to get your medicines      These medications were sent to [facility] [id number] [place] [address] AT [address] [address]     Phone:  [phone]     exemestane 25 MG tablet                Primary Care Provider Office Phone # Fax #    Checo Crockett [phone]       [facility] [place] [facility] [address] [place] [address]        Equal Access to Services     [facility] : Hadii lesia orona hadasho Soomaali, waaxda luqadaha, qaybta kaalmada adeegyada, shayne brown . [facility] [phone].    ATENCIÓN: Si habla español, tiene a jasso disposición servicios gratuitos de asistencia lingüística. [facility] [phone].    We comply with applicable federal civil rights laws and [place] laws. We do not discriminate on the basis of race, color, national origin, age, disability, sex, sexual orientation, or gender identity.            Thank you!     Thank you for choosing [facility]  for your care. Our goal is always to provide you with excellent care. Hearing back from our patients is one way we can continue to improve our services. Please take a few minutes to complete the written survey that you may receive in the mail after your visit with us. Thank you!             Your Updated Medication List - Protect others around you: Learn how to safely use, store and throw away your medicines at www.disposemymeds.org.          This list is accurate as of: 10/20/17  8:44 AM.  Always use your most recent med list.                   Brand Name Dispense Instructions for  use Diagnosis    B Complex-C Tabs           CALCIUM + D3 PO           calcium carbonate 500 MG chewable tablet    TUMS     Take 1 chew tab by mouth daily Reported on 4/21/2017        diphenoxylate-atropine 2.5-0.025 MG per tablet    LOMOTIL     Take 1 tablet by mouth 4 times daily as needed for diarrhea        exemestane 25 MG tablet    AROMASIN    90 tablet    Take 1 tablet (25 mg) by mouth daily    Malignant neoplasm of overlapping sites of left breast in female, estrogen receptor positive (H)       LORazepam 0.5 MG tablet    ATIVAN    30 tablet    Take 1 tablet (0.5 mg) by mouth every 4 hours as needed (Anxiety, Nausea/Vomiting or Sleep)    Malignant neoplasm of overlapping sites of left female breast (H)       traMADol 50 MG tablet    ULTRAM    60 tablet    Take 1 tablet (50 mg) by mouth every 6 hours as needed for pain (maximum 8 tablets per day)    Malignant neoplasm of overlapping sites of left female breast (H)

## 2017-12-01 ENCOUNTER — ONCOLOGY VISIT (OUTPATIENT)
Dept: ONCOLOGY | Facility: CLINIC | Age: 57
End: 2017-12-01
Attending: INTERNAL MEDICINE
Payer: COMMERCIAL

## 2017-12-01 ENCOUNTER — TELEPHONE (OUTPATIENT)
Dept: ONCOLOGY | Facility: CLINIC | Age: 57
End: 2017-12-01

## 2017-12-01 VITALS
TEMPERATURE: 97.8 F | BODY MASS INDEX: 20.98 KG/M2 | OXYGEN SATURATION: 98 % | RESPIRATION RATE: 18 BRPM | WEIGHT: 130 LBS | SYSTOLIC BLOOD PRESSURE: 121 MMHG | HEART RATE: 65 BPM | DIASTOLIC BLOOD PRESSURE: 88 MMHG

## 2017-12-01 DIAGNOSIS — Z17.0 MALIGNANT NEOPLASM OF OVERLAPPING SITES OF LEFT BREAST IN FEMALE, ESTROGEN RECEPTOR POSITIVE (H): ICD-10-CM

## 2017-12-01 DIAGNOSIS — C50.812 MALIGNANT NEOPLASM OF OVERLAPPING SITES OF LEFT BREAST IN FEMALE, ESTROGEN RECEPTOR POSITIVE (H): ICD-10-CM

## 2017-12-01 PROCEDURE — 99214 OFFICE O/P EST MOD 30 MIN: CPT | Performed by: INTERNAL MEDICINE

## 2017-12-01 PROCEDURE — 99211 OFF/OP EST MAY X REQ PHY/QHP: CPT

## 2017-12-01 RX ORDER — TRAMADOL HYDROCHLORIDE 50 MG/1
50 TABLET ORAL EVERY 6 HOURS PRN
Qty: 60 TABLET | Refills: 1 | Status: SHIPPED | OUTPATIENT
Start: 2017-12-01

## 2017-12-01 ASSESSMENT — PAIN SCALES - GENERAL: PAINLEVEL: MODERATE PAIN (4)

## 2017-12-01 NOTE — MR AVS SNAPSHOT
After Visit Summary   12/1/2017    Kita Smith    MRN: [id number]           Patient Information     Date Of Birth          1960        Visit Information        Provider Department      12/1/2017 2:30 PM Preeti Jasso MD [facility]        Today's Diagnoses     Malignant neoplasm of overlapping sites of left breast in female, estrogen receptor positive (H)          Care Instructions    -tramadol prescription printed  -schedule PET-CT next week  Scheduled - Mirian    Patient declined AVS, information sheet given - Mirian            Follow-ups after your visit        Your next 10 appointments already scheduled     Dec 04, 2017  1:15 PM CST   (Arrive by 1:00 PM)   PET ONCOLOGY WHOLE BODY with [id number]   [facility] PET CT ([facility])    [address] [place] [id number] [phone]           Tell your doctor:   If there is any chance you may be pregnant or if you are breastfeeding.   If you have problems lying in small spaces (claustrophobia). If you do, your doctor may give you medicine to help you relax. If you have diabetes:   Have your exam early in the morning. Your blood glucose will go up as the day goes by.   Your glucose level must be 180 or less at the start of the exam. Please take any medicines you need to ensure this blood glucose level. 24 hours before your scan: Don t do any heavy exercise. (No jogging, aerobics or other workouts.) Exercise will make your pictures less accurate. 6 hours before your scan:   Stop all food and liquids (except water).   Do not chew gum or suck on mints.   If you need to take medicine with food, you may take it with a few crackers.  Please call your Imaging Department at your exam site with any questions.            Jan 26, 2018  8:00 AM CST   Return Visit with  Oncology Nurse   [facility] ([facility])    [facility]  [address]  [address]  [place] [address]   [phone]            2018  8:00 AM CST   Return Visit with Preeti Jasso MD   [facility] ([facility])    [facility]  [address] [address]   [phone]              Future tests that were ordered for you today     Open Future Orders        Priority Expected Expires Ordered    PET Oncology Whole Body Routine  2018            Who to contact     If you have questions or need follow up information about today's clinic visit or your schedule please contact [facility] directly at [phone].  Normal or non-critical lab and imaging results will be communicated to you by WeStorehart, letter or phone within 4 business days after the clinic has received the results. If you do not hear from us within 7 days, please contact the clinic through WeStorehart or phone. If you have a critical or abnormal lab result, we will notify you by phone as soon as possible.  Submit refill requests through Wellframe or call your pharmacy and they will forward the refill request to us. Please allow 3 business days for your refill to be completed.          Additional Information About Your Visit        Wellframe Information     Wellframe lets you send messages to your doctor, view your test results, renew your prescriptions, schedule appointments and more. To sign up, go to [email or web address] . Click on \"Log in\" on the left side of the screen, which will take you to the Welcome page. Then click on \"Sign up Now\" on the right side of the page.     You will be asked to enter the access code listed below, as well as some personal information. Please follow the directions to create your username and password.     Your access code is: [email or web address]  Expires: 2018  6:38 AM     Your access code will  in 90 days. If you need help or a new code, please call your [place] clinic or [phone].      "   Care EveryWhere ID     This is your Care EveryWhere ID. This could be used by other organizations to access your [facility] medical records  [id number]        Your Vitals Were     Pulse Temperature Respirations Last Period Pulse Oximetry BMI (Body Mass Index)    65 97.8  F (36.6  C) 18 12/01/2006 98% 20.98 kg/m2       Blood Pressure from Last 3 Encounters:   12/01/17 121/88   10/20/17 119/85   07/21/17 114/75    Weight from Last 3 Encounters:   12/01/17 59 kg (130 lb)   10/20/17 57.6 kg (127 lb)   07/21/17 57.3 kg (126 lb 6.4 oz)                 Where to get your medicines      Some of these will need a paper prescription and others can be bought over the counter.  Ask your nurse if you have questions.     Bring a paper prescription for each of these medications     traMADol 50 MG tablet          Primary Care Provider Office Phone # Fax #    Checo Crockett [phone]       [facility] [place] [facility] [address] [place] [address]        Equal Access to Services     [facility] : Hadii lesia kidd Sogui, waaxda jayna, qaybta kaalramon hein, shayne narayanan. So [facility] [phone].    ATENCIÓN: Si habla español, tiene a jasso disposición servicios gratuitos de asistencia lingüística. [facility] [phone].    We comply with applicable federal civil rights laws and [place] laws. We do not discriminate on the basis of race, color, national origin, age, disability, sex, sexual orientation, or gender identity.            Thank you!     Thank you for choosing [facility]  for your care. Our goal is always to provide you with excellent care. Hearing back from our patients is one way we can continue to improve our services. Please take a few minutes to complete the written survey that you may receive in the mail after your visit with us. Thank you!             Your Updated Medication List - Protect others around you: Learn how to safely  use, store and throw away your medicines at www.disposemymeds.org.          This list is accurate as of: 12/1/17  3:15 PM.  Always use your most recent med list.                   Brand Name Dispense Instructions for use Diagnosis    B Complex-C Tabs           CALCIUM + D3 PO           calcium carbonate 500 MG chewable tablet    TUMS     Take 1 chew tab by mouth daily Reported on 4/21/2017        diphenoxylate-atropine 2.5-0.025 MG per tablet    LOMOTIL     Take 1 tablet by mouth 4 times daily as needed for diarrhea        exemestane 25 MG tablet    AROMASIN    90 tablet    Take 1 tablet (25 mg) by mouth daily    Malignant neoplasm of overlapping sites of left breast in female, estrogen receptor positive (H)       LORazepam 0.5 MG tablet    ATIVAN    30 tablet    Take 1 tablet (0.5 mg) by mouth every 4 hours as needed (Anxiety, Nausea/Vomiting or Sleep)    Malignant neoplasm of overlapping sites of left female breast (H)       traMADol 50 MG tablet    ULTRAM    60 tablet    Take 1 tablet (50 mg) by mouth every 6 hours as needed for pain (maximum 8 tablets per day)    Malignant neoplasm of overlapping sites of left breast in female, estrogen receptor positive (H)

## 2017-12-01 NOTE — TELEPHONE ENCOUNTER
Patient called concerned regarding a new lump on her left rib. Consulted with Dr. Jasso and patient will be added on to see Dr. Jasso today at 0230 PM. Kary Schultz RN

## 2017-12-01 NOTE — LETTER
"    12/1/2017         RE: Kita Smith  [address]  [place] [address]        Dear Colleague,    Thank you for referring your patient, Kita Smith, to the [facility]. Please see a copy of my visit note below.    Oncology Rooming Note    December 1, 2017 2:39 PM   Kita Smith is a 57 year old female who presents for:    Chief Complaint   Patient presents with     Oncology Clinic Visit     New- lump on the left rib     Initial Vitals: /88 (BP Location: Right arm, Patient Position: Chair)  Pulse 65  Temp 97.8  F (36.6  C)  Resp 18  Wt 59 kg (130 lb)  LMP 12/01/2006  SpO2 98%  BMI 20.98 kg/m2 Estimated body mass index is 20.98 kg/(m^2) as calculated from the following:    Height as of 6/21/17: 1.676 m (5' 6\").    Weight as of this encounter: 59 kg (130 lb). Body surface area is 1.66 meters squared.  Moderate Pain (4) Comment: left back,hands,pt feel pain, sick   Patient's last menstrual period was 12/01/2006.  Allergies reviewed: Yes  Medications reviewed: Yes    Medications: pt needs refill on traMADol 50 mg tablet   Pharmacy name entered into [facility]: [id number] - [place], [place] - [address] AT [facility] [address]    Clinical concerns:  Pt is concerned pain in lower back,neck,hands,legs,extremly tired.    5 minutes for nursing intake (face to face time)     Minnie Garner CMA          DISCHARGE PLAN:  Next appointments: See patient instruction section. Pt instructions reviewed with pt. Tramadol rx given to pt. Pt brought to lobby to schedule PET-CT.   Departure Mode: Ambulatory  Accompanied by: self  2 minutes for nursing discharge (face to face time)   Damari Monzon CMA        [facility]    Hematology/Oncology Established Patient Follow-up Note      Today's Date: 12/01/2017    Reason for Follow-up: history of breast cancer and melanoma; here with new lump under the left rib and " diffuse pains    HISTORY OF PRESENT ILLNESS: Kita Smith is a 57 year old female who is being followed for adenocarcinoma of the breast as well as melanoma. She was previously followed by Dr. Minor.  Kita is known to be BRCA-2 positive. She has undergone bilateral mastectomy.  She has also undergone hysterectomy and bilateral salpingo-oophorectomy on 04/06/2013 for prophylaxis of ovarian and uterine cancer.     She first presented with multifocal lesions in the left breast in 2006. She underwent left mastectomy on 01/28/2006 demonstrating multifocal infiltrating ductal adenocarcinoma, grade 2, with DCIS. She had 3 separate lesions measuring 1.5 cm, 0.5 cm, and 0.4 cm. All lymph nodes were negative. The tumor was ER/SD positive and HER-2 negative. She received 4 cycles of dose-dense Adriamycin and Cytoxan and was then on tamoxifen through 2012 when it was discontinued.     In the summer of 2012, Kita noted a hyperpigmented lesion over the right neck which was rapidly growing and changing over 1-2 months. The lesion was excised by Dr. Nataliia Baron. The lesion was a Santiago level III melanoma Breslow depth 0.5 mm.   Stage IA.  A wide excision was subsequently performed by Dr. Nataliia Baron, with 1 cm margins and no evidence of residual disease. The patient was subsequently referred to Dr. Childers. A PET-CT showed no evidence of metastatic disease. 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 Bazan of [facility] in March 2016.  This was stage 0.    On a CT scan study done on 10/12/2010 she was noted to have multiple bilateral pulmonary nodules, which were felt to be granulomatous. These have been followed; these were PET negative. Her most recent CT of the chest done 04/05/2013 showed that the tiny pulmonary nodules were stable over 3 years and therefore these are no longer followed on a routine basis.      PET-CT in June 2016  showed left axillary lymphadenopathy and biopsy of lymph node showed metastatic breast carcinoma, consistent with recurrence.  On 6/27/16, she underwent left lateral breast lesion excision and left axillary dissection.  Pathology showed invasive ductal carcinoma, grade 3, tumor size 1.5 cm +LVI, 6 of 31 lymph nodes were involved, ER positive (75%), AR positive (5%), HER-2 negative.  4 of 29 lymph node were positive in the axillary dissection.  There was excision of a second breast nodule that also had metastatic breast cancer.      She began chemotherapy on 8/11/16, and completed docetaxel+carboplatin x 6 cycles on 11/25/16.  She completed radiation in late February 2017.      Port was removed on 3/16/17.    She started anastrozole on 3/16/17.  Due to arthralgias, she switched to exemestane in October 2017.        INTERIM HISTORY:  Kita comes in as add-on appointment today.  She says that this morning, she felt a lump under her left rib area.  It is not painful.  In addition, she says that she just has not felt well.  She says that she has pains in her back , arms, and legs.  She has been tearful and fatigued.         REVIEW OF SYSTEMS:   14 point ROS was reviewed and is negative other than as noted above in HPI.       HOME MEDICATIONS:  Current Outpatient Prescriptions   Medication Sig Dispense Refill     exemestane (AROMASIN) 25 MG tablet Take 1 tablet (25 mg) by mouth daily 90 tablet 3     diphenoxylate-atropine (LOMOTIL) 2.5-0.025 MG per tablet Take 1 tablet by mouth 4 times daily as needed for diarrhea       traMADol (ULTRAM) 50 MG tablet Take 1 tablet (50 mg) by mouth every 6 hours as needed for pain (maximum 8 tablets per day) 60 tablet 1     Calcium Carb-Cholecalciferol (CALCIUM + D3 PO)        B Complex-C TABS        calcium carbonate (TUMS) 500 MG chewable tablet Take 1 chew tab by mouth daily Reported on 4/21/2017       LORazepam (ATIVAN) 0.5 MG tablet Take 1 tablet (0.5 mg) by mouth every 4 hours as  needed (Anxiety, Nausea/Vomiting or Sleep) 30 tablet 2         ALLERGIES:  Allergies   Allergen Reactions     Percocet [Oxycodone-Acetaminophen] Nausea     Adhesive Tape Blisters     REDNESS,  bandaides     Hydrocodone Nausea and Vomiting     Latex      Wound Dressing Adhesive          PAST MEDICAL HISTORY:  Past Medical History:   Diagnosis Date     Basal cell carcinoma      Breast CA (H)      Depression with anxiety      Histoplasmosis      Malignant melanoma (H)      PONV (postoperative nausea and vomiting)          PAST SURGICAL HISTORY:  Past Surgical History:   Procedure Laterality Date     BIOPSY OF SKIN LESION       BREAST SURGERY      bilat mastectomy       C RAD RESEC TONSIL/PILLARS        SECTION      times 2     CL AFF SURGICAL PATHOLOGY      left wrist     DAVINCI HYSTERECTOMY TOTAL, BILATERAL SALPINGO-OOPHORECTOMY, COMBINED  2013    Procedure: COMBINED DAVINCI HYSTERECTOMY TOTAL, SALPINGO-OOPHORECTOMY;  ROBOTIC ASSISTED TOTAL LAPAROSCOPIC HYSTERECTOMY, BILATERAL SALPINGO OOPHORECTOMY, PARTIAL VULVECTOMY ;  Surgeon: Hira Jenkins MD;  Location:  OR     DISSECT LYMPH NODE AXILLA Left 2016    Procedure: DISSECT LYMPH NODE AXILLA;  Surgeon: Hebert Driscoll MD;  Location:  OR     GENERAL SURGERY                           DATE:   &    C section     HC REVISE BREAST RECONSTRUCTION       HEMORRHOIDECTOMY       INSERT PORT VASCULAR ACCESS Right 2016    Procedure: INSERT PORT VASCULAR ACCESS;  Surgeon: Hebert Driscoll MD;  Location:  OR     MASTECTOMY       melanoma removal[      right sided neck     MOHS MICROGRAPHIC PROCEDURE       PROCTOPLASTY  2013    Procedure: PROCTOPLASTY;  PROCTOPLASTY TO REPAIR ANAL FISSURE AND STENOSIS;  Surgeon: Goldberg, Stanley Morton, MD;  Location: [facility]     REMOVE PORT VASCULAR ACCESS N/A 3/16/2017    Procedure: REMOVE PORT VASCULAR ACCESS;  Surgeon: Hebert Driscoll MD;  Location: [facility]     VULVECTOMY  SIMPLE  2013    Procedure: VULVECTOMY SIMPLE;;  Surgeon: Hira Jenkins MD;  Location:  OR         SOCIAL HISTORY:  Social History     Social History     Marital status:      Spouse name: N/A     Number of children: N/A     Years of education: N/A     Occupational History     Not on file.     Social History Main Topics     Smoking status: Former Smoker     Packs/day: 1.50     Years: 4.00     Types: Cigarettes     Quit date: 1980     Smokeless tobacco: Never Used     Alcohol use Yes      Comment: occasionally     Drug use: No     Sexual activity: Not on file     Other Topics Concern     Not on file     Social History Narrative         FAMILY HISTORY:  Family History   Problem Relation Age of Onset     CANCER Mother      brain     Other Cancer Mother      Cancer - colorectal Father      Hypertension Father      Colon Cancer Father      Other Cancer Sister      CANCER Maternal Aunt      breast     CANCER Other      breast in cousin     Other Cancer Maternal Grandfather      Breast Cancer Paternal Grandmother      Other Cancer Paternal Grandfather          PHYSICAL EXAM:  Vital signs:  /88 (BP Location: Right arm, Patient Position: Chair)  Pulse 65  Temp 97.8  F (36.6  C)  Resp 18  Wt 59 kg (130 lb)  LMP 2006  SpO2 98%  BMI 20.98 kg/m2   ECO  GENERAL/CONSTITUTIONAL: No acute distress.  EYES: No scleral icterus.  LYMPH: No anterior cervical, posterior cervical, supraclavicular, or axillary adenopathy.   RESPIRATORY: Clear to auscultation bilaterally. No crackles or wheezing.   CARDIOVASCULAR: Regular rate and rhythm without murmurs, gallops, or rubs.  GASTROINTESTINAL: No tenderness. The patient has normal bowel sounds. No guarding.  No distention.  BREAST: s/p bilateral mastectomy with implants in place.  There is a palpable lump under the left rib area.    MUSCULOSKELETAL: Warm and well-perfused.   NEUROLOGIC: Alert, oriented, answers questions  appropriately.  INTEGUMENTARY: No jaundice.  Numerous scattered moles.    GAIT: Steady, does not use assistive device  Port has been removed.      LABS:  None today.      ASSESSMENT/PLAN:  Kita Smith is a 57 year old female:    Lump under the left rib and diffuse pains:  She has pain in her back, arms, and legs.  With the new lump and these pains, will obtain PET-CT to rule out metastatic lesions.  -PET-CT next week  -she inquired about medical cannabis, which may help with her chronic pains.  I have certified her.      1) Left breast cancer: diagnosed in 2006, s/p bilateral mastectomy and chemotherapy and hormone therapy.  She is known to be BRCA-2 positive and has undergone hysterectomy and bilateral salpingo-oophorectomy on 04/06/2013 for prophylaxis of ovarian and uterine cancer.  Then, she had recurrence, s/p left lateral breast lesion excision and left axillary dissection on 6/27/16.  Pathology showed invasive ductal carcinoma, grade 3, tumor size 1.5 cm +LVI, 6 of 31 lymph nodes were involved, ER positive (75%), IL positive (5%), HER-2 negative.  4 of 29 lymph node were positive in the axillary dissection.  There was excision of a second breast nodule that also had metastatic breast cancer.      She has completed chemotherapy and radiation.  She started anastrozole on 3/7/17.  She switched to exemestane in October 2017 due to arthralgias.    -continue exemestane 25 mg daily  -Kita wants routine imaging.  With her recurrent cancer and high-risk BRCA, it would be reasonable to obtain imaging.  She initially wanted annual PET-CT's, but after thinking about things and considering the risks, she would like to have them every 3 years.  As above, with new symptoms, will obtain PET-CT next week.  -patient okay with no tumor markers though, since that was normal throughout her recurrence and is likely not useful.  She requests to follow CBC and CMP.  -she has follow-up in February 2018 with labs; will call  her next week with PET results and schedule follow-up accordingly    2) Left arm and back pain: started after surgery, and worsened on chemotherapy and radiation.  She stopped doing physical therapy, and she feels it getting tight again.  -she will resume physical therapy exercises at home  -she takes tramadol occasionally.  Prescription renewed today.    3) Melanoma: She has history of malignant melanoma x 2, stage IA of the right neck and stage 0 of the left temple.    -she follows every 6 months with dermatologist close to her home    4) Pulmonary nodules: were found in 2010 that were thought granulomatous and PET negative at the time, and have been stable on subsequent scans.     5) Reflux:  -she tried omeprazole     6) Osteopenia: osteopenia of both hips and mild osteopenia of the lumbar spine  -take calcium and vitamin D  -repeat DEXA scan in March 2019    7) Leukopenia: May be from chemotherapy and radiation.  CBC in October 2017 was normal.  -will repeat CBC when she returns to clinic    8) She got a flu shot this year.      I spent a total of 25 minutes with the patient, with over >50% of the time in counseling and/or coordination of care.      Preeti Jasso MD  Hematology/Oncology  [facility]          Again, thank you for allowing me to participate in the care of your patient.        Sincerely,        Preeti Jasso MD

## 2017-12-01 NOTE — PATIENT INSTRUCTIONS
-tramadol prescription printed  -schedule PET-CT next week  Scheduled - Mirian    Patient declined AVS, information sheet given - Mirian

## 2017-12-01 NOTE — PROGRESS NOTES
"Oncology Rooming Note    December 1, 2017 2:39 PM   Kita Smith is a 57 year old female who presents for:    Chief Complaint   Patient presents with     Oncology Clinic Visit     New- lump on the left rib     Initial Vitals: /88 (BP Location: Right arm, Patient Position: Chair)  Pulse 65  Temp 97.8  F (36.6  C)  Resp 18  Wt 59 kg (130 lb)  LMP 12/01/2006  SpO2 98%  BMI 20.98 kg/m2 Estimated body mass index is 20.98 kg/(m^2) as calculated from the following:    Height as of 6/21/17: 1.676 m (5' 6\").    Weight as of this encounter: 59 kg (130 lb). Body surface area is 1.66 meters squared.  Moderate Pain (4) Comment: left back,hands,pt feel pain, sick   Patient's last menstrual period was 12/01/2006.  Allergies reviewed: Yes  Medications reviewed: Yes    Medications: pt needs refill on traMADol 50 mg tablet   Pharmacy name entered into [facility]: [facility] [address] [place] [address] [facility] [place] [address]    Clinical concerns:  Pt is concerned pain in lower back,neck,hands,legs,extremly tired.    5 minutes for nursing intake (face to face time)     Minnie Garner CMA          DISCHARGE PLAN:  Next appointments: See patient instruction section. Pt instructions reviewed with pt. Tramadol rx given to pt. Pt brought to [facility] to schedule PET-CT.   Departure Mode: Ambulatory  Accompanied by: self  2 minutes for nursing discharge (face to face time)   Damari Monzon CMA      "

## 2017-12-04 ENCOUNTER — HOSPITAL ENCOUNTER (OUTPATIENT)
Dept: PET IMAGING | Facility: CLINIC | Age: 57
Discharge: HOME | End: 2017-12-04
Attending: INTERNAL MEDICINE | Admitting: INTERNAL MEDICINE
Payer: COMMERCIAL

## 2017-12-04 DIAGNOSIS — C50.812 MALIGNANT NEOPLASM OF OVERLAPPING SITES OF LEFT BREAST IN FEMALE, ESTROGEN RECEPTOR POSITIVE (H): ICD-10-CM

## 2017-12-04 DIAGNOSIS — Z17.0 MALIGNANT NEOPLASM OF OVERLAPPING SITES OF LEFT BREAST IN FEMALE, ESTROGEN RECEPTOR POSITIVE (H): ICD-10-CM

## 2017-12-04 LAB — GLUCOSE BLDC GLUCOMTR-MCNC: 111 MG/DL (ref 70–99)

## 2017-12-04 PROCEDURE — 25000128 H RX IP 250 OP 636: Performed by: INTERNAL MEDICINE

## 2017-12-04 PROCEDURE — 82962 GLUCOSE BLOOD TEST: CPT

## 2017-12-04 PROCEDURE — 34300033 ZZH RX 343: Performed by: INTERNAL MEDICINE

## 2017-12-04 PROCEDURE — 78815 PET IMAGE W/CT SKULL-THIGH: CPT | Mod: PS

## 2017-12-04 PROCEDURE — A9552 F18 FDG: HCPCS | Performed by: INTERNAL MEDICINE

## 2017-12-04 PROCEDURE — 71260 CT THORAX DX C+: CPT

## 2017-12-04 RX ORDER — IOPAMIDOL 755 MG/ML
100 INJECTION, SOLUTION INTRAVASCULAR ONCE
Status: COMPLETED | OUTPATIENT
Start: 2017-12-04 | End: 2017-12-04

## 2017-12-04 RX ADMIN — FLUDEOXYGLUCOSE F-18 10.7 MCI.: 500 INJECTION, SOLUTION INTRAVENOUS at 13:27

## 2017-12-04 RX ADMIN — IOPAMIDOL 100 ML: 755 INJECTION, SOLUTION INTRAVENOUS at 13:28

## 2018-01-26 ENCOUNTER — ONCOLOGY VISIT (OUTPATIENT)
Dept: ONCOLOGY | Facility: CLINIC | Age: 58
End: 2018-01-26
Attending: INTERNAL MEDICINE
Payer: COMMERCIAL

## 2018-01-26 ENCOUNTER — HOSPITAL ENCOUNTER (OUTPATIENT)
Facility: CLINIC | Age: 58
Setting detail: SPECIMEN
Discharge: HOME | End: 2018-01-26
Attending: INTERNAL MEDICINE | Admitting: INTERNAL MEDICINE
Payer: COMMERCIAL

## 2018-01-26 DIAGNOSIS — Z17.0 MALIGNANT NEOPLASM OF OVERLAPPING SITES OF LEFT BREAST IN FEMALE, ESTROGEN RECEPTOR POSITIVE (H): ICD-10-CM

## 2018-01-26 DIAGNOSIS — C50.812 MALIGNANT NEOPLASM OF OVERLAPPING SITES OF LEFT BREAST IN FEMALE, ESTROGEN RECEPTOR POSITIVE (H): ICD-10-CM

## 2018-01-26 DIAGNOSIS — Z13.21 ENCOUNTER FOR VITAMIN DEFICIENCY SCREENING: Primary | ICD-10-CM

## 2018-01-26 LAB
ALBUMIN SERPL-MCNC: 3.6 G/DL (ref 3.4–5)
ALP SERPL-CCNC: 69 U/L (ref 40–150)
ALT SERPL W P-5'-P-CCNC: 17 U/L (ref 0–50)
ANION GAP SERPL CALCULATED.3IONS-SCNC: 6 MMOL/L (ref 3–14)
AST SERPL W P-5'-P-CCNC: 14 U/L (ref 0–45)
BASOPHILS # BLD AUTO: 0.1 10E9/L (ref 0–0.2)
BASOPHILS NFR BLD AUTO: 1.2 %
BILIRUB SERPL-MCNC: 0.8 MG/DL (ref 0.2–1.3)
BUN SERPL-MCNC: 13 MG/DL (ref 7–30)
CALCIUM SERPL-MCNC: 9.3 MG/DL (ref 8.5–10.1)
CHLORIDE SERPL-SCNC: 105 MMOL/L (ref 94–109)
CO2 SERPL-SCNC: 28 MMOL/L (ref 20–32)
CREAT SERPL-MCNC: 0.8 MG/DL (ref 0.52–1.04)
DEPRECATED CALCIDIOL+CALCIFEROL SERPL-MC: 29 UG/L (ref 20–75)
DIFFERENTIAL METHOD BLD: NORMAL
EOSINOPHIL # BLD AUTO: 0.1 10E9/L (ref 0–0.7)
EOSINOPHIL NFR BLD AUTO: 2.1 %
ERYTHROCYTE [DISTWIDTH] IN BLOOD BY AUTOMATED COUNT: 12.9 % (ref 10–15)
GFR SERPL CREATININE-BSD FRML MDRD: 73 ML/MIN/1.7M2
GLUCOSE SERPL-MCNC: 76 MG/DL (ref 70–99)
HCT VFR BLD AUTO: 42.3 % (ref 35–47)
HGB BLD-MCNC: 14.7 G/DL (ref 11.7–15.7)
IMM GRANULOCYTES # BLD: 0 10E9/L (ref 0–0.4)
IMM GRANULOCYTES NFR BLD: 0 %
LYMPHOCYTES # BLD AUTO: 1.2 10E9/L (ref 0.8–5.3)
LYMPHOCYTES NFR BLD AUTO: 27.7 %
MCH RBC QN AUTO: 32 PG (ref 26.5–33)
MCHC RBC AUTO-ENTMCNC: 34.8 G/DL (ref 31.5–36.5)
MCV RBC AUTO: 92 FL (ref 78–100)
MONOCYTES # BLD AUTO: 0.4 10E9/L (ref 0–1.3)
MONOCYTES NFR BLD AUTO: 9.2 %
NEUTROPHILS # BLD AUTO: 2.5 10E9/L (ref 1.6–8.3)
NEUTROPHILS NFR BLD AUTO: 59.8 %
NRBC # BLD AUTO: 0 10*3/UL
NRBC BLD AUTO-RTO: 0 /100
PLATELET # BLD AUTO: 203 10E9/L (ref 150–450)
POTASSIUM SERPL-SCNC: 4.1 MMOL/L (ref 3.4–5.3)
PROT SERPL-MCNC: 7.2 G/DL (ref 6.8–8.8)
RBC # BLD AUTO: 4.59 10E12/L (ref 3.8–5.2)
SODIUM SERPL-SCNC: 139 MMOL/L (ref 133–144)
WBC # BLD AUTO: 4.2 10E9/L (ref 4–11)

## 2018-01-26 PROCEDURE — 85025 COMPLETE CBC W/AUTO DIFF WBC: CPT | Performed by: INTERNAL MEDICINE

## 2018-01-26 PROCEDURE — 80053 COMPREHEN METABOLIC PANEL: CPT | Performed by: INTERNAL MEDICINE

## 2018-01-26 PROCEDURE — 82306 VITAMIN D 25 HYDROXY: CPT | Performed by: INTERNAL MEDICINE

## 2018-01-26 PROCEDURE — 36415 COLL VENOUS BLD VENIPUNCTURE: CPT

## 2018-01-26 NOTE — MR AVS SNAPSHOT
"              After Visit Summary   1/26/2018    Kita Smith    MRN: [id number]           Patient Information     Date Of Birth          1960        Visit Information        Provider Department      1/26/2018 8:00 AM Nurse,  Oncology [facility]        Today's Diagnoses     Malignant neoplasm of overlapping sites of left breast in female, estrogen receptor positive (H)           Follow-ups after your visit        Your next 10 appointments already scheduled     Jan 26, 2018  8:00 AM CST   Return Visit with  Oncology Nurse   [facility] ([facility])    [facility]  [address]  [place] [address]   [phone]            Feb 02, 2018  8:00 AM CST   Return Visit with Preeti Jasso MD   [facility] ([facility])    [facility]  [address]  [place] [id number] [phone]              Who to contact     If you have questions or need follow up information about today's clinic visit or your schedule please contact [facility] directly at [phone].  Normal or non-critical lab and imaging results will be communicated to you by MyChart, letter or phone within 4 business days after the clinic has received the results. If you do not hear from us within 7 days, please contact the clinic through MyChart or phone. If you have a critical or abnormal lab result, we will notify you by phone as soon as possible.  Submit refill requests through [employer] or call your pharmacy and they will forward the refill request to us. Please allow 3 business days for your refill to be completed.          Additional Information About Your Visit        MyChart Information     [employer] lets you send messages to your doctor, view your test results, renew your prescriptions, schedule appointments and more. To sign up, go to [email or web address] . Click on \"Log in\" on " "the left side of the screen, which will take you to the Welcome page. Then click on \"Sign up Now\" on the right side of the page.     You will be asked to enter the access code listed below, as well as some personal information. Please follow the directions to create your username and password.     Your access code is: [email or web address]  Expires: 2018  7:48 AM     Your access code will  in 90 days. If you need help or a new code, please call your [facility] or [phone].        Care EveryWhere ID     This is your Care EveryWhere ID. This could be used by other organizations to access your [facility] medical records  [id number]        Your Vitals Were     Last Period                   2006            Blood Pressure from Last 3 Encounters:   17 121/88   10/20/17 119/85   17 114/75    Weight from Last 3 Encounters:   17 59 kg (130 lb)   10/20/17 57.6 kg (127 lb)   17 57.3 kg (126 lb 6.4 oz)              We Performed the Following     CBC with platelets differential     Comprehensive metabolic panel        Primary Care Provider Office Phone # Fax #    Checo Crockett [phone]       [facility] [place] [address] [place] [address]        Equal Access to Services     STEPHANIE VILLEGAS : Hadii aad ku hadasho Sojaceali, waaxda luqadaha, qaybta kaalmada angel luis, shayne narayanan. So [facility] [phone].    ATENCIÓN: Si habla español, tiene a jasso disposición servicios gratuitos de asistencia lingüística. Darius al [phone].    We comply with applicable federal civil rights laws and [place] laws. We do not discriminate on the basis of race, color, national origin, age, disability, sex, sexual orientation, or gender identity.            Thank you!     Thank you for choosing [facility]  for your care. Our goal is always to provide you with excellent care. Hearing back from our patients is one way we can " continue to improve our services. Please take a few minutes to complete the written survey that you may receive in the mail after your visit with us. Thank you!             Your Updated Medication List - Protect others around you: Learn how to safely use, store and throw away your medicines at www.disposemymeds.org.          This list is accurate as of 1/26/18  7:48 AM.  Always use your most recent med list.                   Brand Name Dispense Instructions for use Diagnosis    B Complex-C Tabs           CALCIUM + D3 PO           calcium carbonate 500 MG chewable tablet    TUMS     Take 1 chew tab by mouth daily Reported on 4/21/2017        diphenoxylate-atropine 2.5-0.025 MG per tablet    LOMOTIL     Take 1 tablet by mouth 4 times daily as needed for diarrhea        exemestane 25 MG tablet    AROMASIN    90 tablet    Take 1 tablet (25 mg) by mouth daily    Malignant neoplasm of overlapping sites of left breast in female, estrogen receptor positive (H)       LORazepam 0.5 MG tablet    ATIVAN    30 tablet    Take 1 tablet (0.5 mg) by mouth every 4 hours as needed (Anxiety, Nausea/Vomiting or Sleep)    Malignant neoplasm of overlapping sites of left female breast (H)       traMADol 50 MG tablet    ULTRAM    60 tablet    Take 1 tablet (50 mg) by mouth every 6 hours as needed for pain (maximum 8 tablets per day)    Malignant neoplasm of overlapping sites of left breast in female, estrogen receptor positive (H)

## 2018-01-26 NOTE — PROGRESS NOTES
Medical Assistant Note:  Kita Smith presents today for lab visit.    Patient seen by provider today: No.   present during visit today: Not Applicable.    Concerns: No Concerns.    Procedure:  Lab draw site: RAC, Needle type: BF, Gauge: 21 g gauze and coban applied.    Post Assessment:  Labs drawn without difficulty: Yes.    Discharge Plan:  Departure Mode: Ambulatory.    Face to Face Time: 4.    Kajal Bailey MA

## 2018-01-26 NOTE — LETTER
1/26/2018         RE: Kita Smith  [address]  [facility] [address]        Dear Colleague,    Thank you for referring your patient, Kita Smith, to the [facility]. Please see a copy of my visit note below.    Medical Assistant Note:  Kita Smith presents today for lab visit.    Patient seen by provider today: No.   present during visit today: Not Applicable.    Concerns: No Concerns.    Procedure:  Lab draw site: RAC, Needle type: BF, Gauge: 21 g gauze and coban applied.    Post Assessment:  Labs drawn without difficulty: Yes.    Discharge Plan:  Departure Mode: Ambulatory.    Face to Face Time: 4.    Kajal Bailey MA              Again, thank you for allowing me to participate in the care of your patient.        Sincerely,        Oncology Nurse

## 2018-02-02 ENCOUNTER — ONCOLOGY VISIT (OUTPATIENT)
Dept: ONCOLOGY | Facility: CLINIC | Age: 58
End: 2018-02-02
Attending: INTERNAL MEDICINE
Payer: COMMERCIAL

## 2018-02-02 VITALS
TEMPERATURE: 98.3 F | HEART RATE: 78 BPM | BODY MASS INDEX: 21.27 KG/M2 | DIASTOLIC BLOOD PRESSURE: 82 MMHG | OXYGEN SATURATION: 95 % | SYSTOLIC BLOOD PRESSURE: 129 MMHG | WEIGHT: 131.8 LBS | RESPIRATION RATE: 16 BRPM

## 2018-02-02 DIAGNOSIS — C50.812 MALIGNANT NEOPLASM OF OVERLAPPING SITES OF LEFT BREAST IN FEMALE, ESTROGEN RECEPTOR POSITIVE (H): Primary | ICD-10-CM

## 2018-02-02 DIAGNOSIS — Z17.0 MALIGNANT NEOPLASM OF OVERLAPPING SITES OF LEFT BREAST IN FEMALE, ESTROGEN RECEPTOR POSITIVE (H): Primary | ICD-10-CM

## 2018-02-02 PROCEDURE — 99214 OFFICE O/P EST MOD 30 MIN: CPT | Performed by: INTERNAL MEDICINE

## 2018-02-02 PROCEDURE — G0463 HOSPITAL OUTPT CLINIC VISIT: HCPCS

## 2018-02-02 ASSESSMENT — PAIN SCALES - GENERAL: PAINLEVEL: NO PAIN (0)

## 2018-02-02 NOTE — PROGRESS NOTES
[facility]    Hematology/Oncology Established Patient Follow-up Note      Today's Date: 2/02/2018    Reason for Follow-up: history of breast cancer and melanoma    HISTORY OF PRESENT ILLNESS: Kita Smith is a 57 year old female who is being followed for adenocarcinoma of the breast as well as melanoma. She was previously followed by Dr. Minor.  Kita is known to be BRCA-2 positive. She has undergone bilateral mastectomy.  She has also undergone hysterectomy and bilateral salpingo-oophorectomy on 04/06/2013 for prophylaxis of ovarian and uterine cancer.     She first presented with multifocal lesions in the left breast in 2006. She underwent left mastectomy on 01/28/2006 demonstrating multifocal infiltrating ductal adenocarcinoma, grade 2, with DCIS. She had 3 separate lesions measuring 1.5 cm, 0.5 cm, and 0.4 cm. All lymph nodes were negative. The tumor was ER/PA positive and HER-2 negative. She received 4 cycles of dose-dense Adriamycin and Cytoxan and was then on tamoxifen through 2012 when it was discontinued.     In the summer of 2012, Kita noted a hyperpigmented lesion over the right neck which was rapidly growing and changing over 1-2 months. The lesion was excised by Dr. Nataliia Baron. The lesion was a Santiago level III melanoma Breslow depth 0.5 mm.   Stage IA.  A wide excision was subsequently performed by Dr. Nataliia Baron, with 1 cm margins and no evidence of residual disease. The patient was subsequently referred to Dr. Childers. A PET-CT showed no evidence of metastatic disease. 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 Bazan of [facility] in March 2016.  This was stage 0.    On a CT scan study done on 10/12/2010 she was noted to have multiple bilateral pulmonary nodules, which were felt to be granulomatous. These have been followed; these were PET negative. Her most recent CT of the chest  done 04/05/2013 showed that the tiny pulmonary nodules were stable over 3 years and therefore these are no longer followed on a routine basis.      PET-CT in June 2016 showed left axillary lymphadenopathy and biopsy of lymph node showed metastatic breast carcinoma, consistent with recurrence.  On 6/27/16, she underwent left lateral breast lesion excision and left axillary dissection.  Pathology showed invasive ductal carcinoma, grade 3, tumor size 1.5 cm +LVI, 6 of 31 lymph nodes were involved, ER positive (75%), MT positive (5%), HER-2 negative.  4 of 29 lymph node were positive in the axillary dissection.  There was excision of a second breast nodule that also had metastatic breast cancer.      She began chemotherapy on 8/11/16, and completed docetaxel+carboplatin x 6 cycles on 11/25/16.  She completed radiation in late February 2017.      Port was removed on 3/16/17.    She started anastrozole on 3/16/17.  Due to arthralgias, she switched to exemestane in October 2017.        INTERIM HISTORY:  Kita comes in for follow-up today.   She says that she has been doing better.  She started seeing a therapist to help with her anxiety, and that has been helping.  She notes tightness in her left axilla still, but that is not new.  She has been doing home stretching exercises, but admits to not doing them as much as she should.  She takes a Tramadol once in a while, but then gets a headache the next morning, so she doesn't like to take it often.  She says that her 22 yo son has moved out of her house, but she still worries about him, drugs and alcohol, and the friends he hangs out with.   She was certified for medical cannabis, but she ultimately decided not to do it due to cost.  She continues to take exemestane.       REVIEW OF SYSTEMS:   14 point ROS was reviewed and is negative other than as noted above in HPI.       HOME MEDICATIONS:  Current Outpatient Prescriptions   Medication Sig Dispense Refill     traMADol  (ULTRAM) 50 MG tablet Take 1 tablet (50 mg) by mouth every 6 hours as needed for pain (maximum 8 tablets per day) 60 tablet 1     exemestane (AROMASIN) 25 MG tablet Take 1 tablet (25 mg) by mouth daily 90 tablet 3     diphenoxylate-atropine (LOMOTIL) 2.5-0.025 MG per tablet Take 1 tablet by mouth 4 times daily as needed for diarrhea       Calcium Carb-Cholecalciferol (CALCIUM + D3 PO)        B Complex-C TABS        calcium carbonate (TUMS) 500 MG chewable tablet Take 1 chew tab by mouth daily Reported on 2017       LORazepam (ATIVAN) 0.5 MG tablet Take 1 tablet (0.5 mg) by mouth every 4 hours as needed (Anxiety, Nausea/Vomiting or Sleep) 30 tablet 2         ALLERGIES:  Allergies   Allergen Reactions     Percocet [Oxycodone-Acetaminophen] Nausea     Adhesive Tape Blisters     REDNESS,  bandaides     Hydrocodone Nausea and Vomiting     Latex      Wound Dressing Adhesive          PAST MEDICAL HISTORY:  Past Medical History:   Diagnosis Date     Basal cell carcinoma      Breast CA (H)      Depression with anxiety      Histoplasmosis      Malignant melanoma (H)      PONV (postoperative nausea and vomiting)          PAST SURGICAL HISTORY:  Past Surgical History:   Procedure Laterality Date     BIOPSY OF SKIN LESION       BREAST SURGERY      bilat mastectomy       C RAD RESEC TONSIL/PILLARS        SECTION      times 2     CL AFF SURGICAL PATHOLOGY      left wrist     DAVINCI HYSTERECTOMY TOTAL, BILATERAL SALPINGO-OOPHORECTOMY, COMBINED  2013    Procedure: COMBINED DAVINCI HYSTERECTOMY TOTAL, SALPINGO-OOPHORECTOMY;  ROBOTIC ASSISTED TOTAL LAPAROSCOPIC HYSTERECTOMY, BILATERAL SALPINGO OOPHORECTOMY, PARTIAL VULVECTOMY ;  Surgeon: Hira Jenkins MD;  Location:  OR     DISSECT LYMPH NODE AXILLA Left 2016    Procedure: DISSECT LYMPH NODE AXILLA;  Surgeon: Hebert Driscoll MD;  Location:  OR     GENERAL SURGERY                           DATE:   &    C section     HC REVISE  BREAST RECONSTRUCTION       HEMORRHOIDECTOMY  1983     INSERT PORT VASCULAR ACCESS Right 6/27/2016    Procedure: INSERT PORT VASCULAR ACCESS;  Surgeon: Hebert Driscoll MD;  Location:  OR     MASTECTOMY       melanoma removal[      right sided neck     MOHS MICROGRAPHIC PROCEDURE       PROCTOPLASTY  4/13/2013    Procedure: PROCTOPLASTY;  PROCTOPLASTY TO REPAIR ANAL FISSURE AND STENOSIS;  Surgeon: Goldberg, Stanley Morton, MD;  Location:  SD     REMOVE PORT VASCULAR ACCESS N/A 3/16/2017    Procedure: REMOVE PORT VASCULAR ACCESS;  Surgeon: Hebert Driscoll MD;  Location:  SD     VULVECTOMY SIMPLE  4/26/2013    Procedure: VULVECTOMY SIMPLE;;  Surgeon: Hira Jenkins MD;  Location:  OR         SOCIAL HISTORY:  Social History     Social History     Marital status:      Spouse name: N/A     Number of children: N/A     Years of education: N/A     Occupational History     Not on file.     Social History Main Topics     Smoking status: Former Smoker     Packs/day: 1.50     Years: 4.00     Types: Cigarettes     Quit date: 1/11/1980     Smokeless tobacco: Never Used     Alcohol use Yes      Comment: occasionally     Drug use: No     Sexual activity: Not on file     Other Topics Concern     Not on file     Social History Narrative         FAMILY HISTORY:  Family History   Problem Relation Age of Onset     CANCER Mother      brain     Other Cancer Mother      Cancer - colorectal Father      Hypertension Father      Colon Cancer Father      Other Cancer Sister      CANCER Maternal Aunt      breast     CANCER Other      breast in cousin     Other Cancer Maternal Grandfather      Breast Cancer Paternal Grandmother      Other Cancer Paternal Grandfather          PHYSICAL EXAM:  Vital signs:  /82 (BP Location: Right arm, Patient Position: Sitting, Cuff Size: Adult Regular)  Pulse 78  Temp 98.3  F (36.8  C) (Oral)  Resp 16  Wt 59.8 kg (131 lb 12.8 oz)  LMP 12/01/2006  SpO2 95%  BMI 21.27  kg/m2   ECO  GENERAL/CONSTITUTIONAL: No acute distress.  EYES: No scleral icterus.  LYMPH: No anterior cervical, posterior cervical, supraclavicular, or axillary adenopathy.   RESPIRATORY: Clear to auscultation bilaterally. No crackles or wheezing.   CARDIOVASCULAR: Regular rate and rhythm without murmurs, gallops, or rubs.  GASTROINTESTINAL: No tenderness. The patient has normal bowel sounds. No guarding.  No distention.  BREAST: s/p bilateral mastectomy with implants in place.      MUSCULOSKELETAL: Warm and well-perfused.   NEUROLOGIC: Alert, oriented, answers questions appropriately.  INTEGUMENTARY: No jaundice.  Numerous scattered moles.    GAIT: Steady, does not use assistive device  Port has been removed.      LABS:  CBC RESULTS:   Recent Labs   Lab Test  18   0745   WBC  4.2   RBC  4.59   HGB  14.7   HCT  42.3   MCV  92   MCH  32.0   MCHC  34.8   RDW  12.9   PLT  203     Recent Labs   Lab Test  18   0745  17   0820   NA  139  139   POTASSIUM  4.1  4.2   CHLORIDE  105  106   CO2  28  27   ANIONGAP  6  6   GLC  76  80   BUN  13  14   CR  0.80  0.80   YANG  9.3  8.7     Lab Results   Component Value Date    AST 14 2018     Lab Results   Component Value Date    ALT 17 2018     No results found for: BILICONJ   Lab Results   Component Value Date    BILITOTAL 0.8 2018     Lab Results   Component Value Date    ALBUMIN 3.6 2018     Lab Results   Component Value Date    PROTTOTAL 7.2 2018      Lab Results   Component Value Date    ALKPHOS 69 2018     Vitamin D: 29    IMAGING:  PET-CT 17:  1. No evidence of recurrent or metastatic disease in the neck, chest,  abdomen or pelvis.   2. Postop changes from bilateral mastectomy and implant  reconstruction.   3. Bibasilar lung nodules with dependent atelectasis and prominent  left lung base calcified granuloma are stable. No associated abnormal  FDG activity. Continued surveillance as clinically warranted.  4.  Stable left hepatic lobe cyst.      ASSESSMENT/PLAN:  Kita Smith is a 57 year old female:      1) Left breast cancer: diagnosed in 2006, s/p bilateral mastectomy and chemotherapy and hormone therapy.  She is known to be BRCA-2 positive and has undergone hysterectomy and bilateral salpingo-oophorectomy on 04/06/2013 for prophylaxis of ovarian and uterine cancer.  Then, she had recurrence, s/p left lateral breast lesion excision and left axillary dissection on 6/27/16.  Pathology showed invasive ductal carcinoma, grade 3, tumor size 1.5 cm +LVI, 6 of 31 lymph nodes were involved, ER positive (75%), [place] positive (5%), HER-2 negative.  4 of 29 lymph node were positive in the axillary dissection.  There was excision of a second breast nodule that also had metastatic breast cancer.      She has completed chemotherapy and radiation.  She started anastrozole on 3/7/17.  She switched to exemestane in October 2017 due to arthralgias.    -continue exemestane 25 mg daily  -Kita wants routine imaging.  With her recurrent cancer and high-risk BRCA, it would be reasonable to obtain imaging.  She initially wanted annual PET-CT's, but after thinking about things and considering the risks, she would like to have them every 2-3 years or if new symptoms occur.  She last had a PET-CT in December 2017 due to a lump at the rib area.  It showed no evidence of disease; there were post-operative changes seen.  -she requests to follow CBC and CMP, as well as tumor marker  -RTC in 4 months with labs    2) Left axilla tightness: No palpable lump, PET-CT was negative.  It is likely post-surgical scarring.    -she is doing physical therapy exercises at home and will try to do them more consistently.  -she takes tramadol occasionally.    -she was certified for medical cannabis, but she decided not to do it due to cost.      3) Melanoma: She has history of malignant melanoma x 2, stage IA of the right neck and stage 0 of the left temple.     -she follows every 6 months with dermatologist close to her home    4) Pulmonary nodules: were found in 2010 that were thought granulomatous and PET negative at the time, and have been stable on subsequent scans.     5) Osteopenia: osteopenia of both hips and mild osteopenia of the lumbar spine  -take calcium and vitamin D  -repeat DEXA scan in March 2019    6) Vitamin D:   -she requests to have levels checked periodically    7) Left hepatic lobe cyst: stable on imaging    8) Anxiety: She is now seeing a therapist, which has been helpful.      I spent a total of 25 minutes with the patient, with over >50% of the time in counseling and/or coordination of care.      Preeti Jasso MD  Hematology/Oncology  [facility]

## 2018-02-02 NOTE — PATIENT INSTRUCTIONS
-continue taking exemestane  -return to clinic in 4 months with labs a few days prior  Scheduled/rodger    AVS printed and given to patient/rdoger

## 2018-02-02 NOTE — LETTER
"    2/2/2018         RE: Kita Smith  [address] [facility] [address]        Dear Colleague,    Thank you for referring your patient, Kita Smith, to the [facility]. Please see a copy of my visit note below.    Oncology Rooming Note    February 2, 2018 7:58 AM   Kita Smith is a 57 year old female who presents for:    Chief Complaint   Patient presents with     Oncology Clinic Visit     Breast cancer, female      Initial Vitals: /82 (BP Location: Right arm, Patient Position: Sitting, Cuff Size: Adult Regular)  Pulse 78  Temp 98.3  F (36.8  C) (Oral)  Resp 16  Wt 59.8 kg (131 lb 12.8 oz)  LMP 12/01/2006  SpO2 95%  BMI 21.27 kg/m2 Estimated body mass index is 21.27 kg/(m^2) as calculated from the following:    Height as of 6/21/17: 1.676 m (5' 6\").    Weight as of this encounter: 59.8 kg (131 lb 12.8 oz). Body surface area is 1.67 meters squared.  No Pain (0) Comment: Data Unavailable   Patient's last menstrual period was 12/01/2006.  Allergies reviewed: Yes  Medications reviewed: Yes    Medications: Medication refills not needed today.  Pharmacy name entered into Eve: [facility] [id number] [place] [address] [address] AT [place] [address]    Clinical concerns: None                    4 minutes for nursing intake (face to face time)     Kajal Bailey MA              [facility]    Hematology/Oncology Established Patient Follow-up Note      Today's Date: 2/02/2018    Reason for Follow-up: history of breast cancer and melanoma    HISTORY OF PRESENT ILLNESS: Kita Smith is a 57 year old female who is being followed for adenocarcinoma of the breast as well as melanoma. She was previously followed by Dr. Minor.  Kita is known to be BRCA-2 positive. She has undergone bilateral mastectomy.  She has also undergone hysterectomy and bilateral salpingo-oophorectomy on 04/06/2013 for prophylaxis of " ovarian and uterine cancer.     She first presented with multifocal lesions in the left breast in 2006. She underwent left mastectomy on 01/28/2006 demonstrating multifocal infiltrating ductal adenocarcinoma, grade 2, with DCIS. She had 3 separate lesions measuring 1.5 cm, 0.5 cm, and 0.4 cm. All lymph nodes were negative. The tumor was ER/MN positive and HER-2 negative. She received 4 cycles of dose-dense Adriamycin and Cytoxan and was then on tamoxifen through 2012 when it was discontinued.     In the summer of 2012, Kita noted a hyperpigmented lesion over the right neck which was rapidly growing and changing over 1-2 months. The lesion was excised by Dr. Nataliia Baron. The lesion was a Santiago level III melanoma Breslow depth 0.5 mm.   Stage IA.  A wide excision was subsequently performed by Dr. Nataliia Baron, with 1 cm margins and no evidence of residual disease. The patient was subsequently referred to Dr. Childers. A PET-CT showed no evidence of metastatic disease. 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 Bazan of [facility] in March 2016.  This was stage 0.    On a CT scan study done on 10/12/2010 she was noted to have multiple bilateral pulmonary nodules, which were felt to be granulomatous. These have been followed; these were PET negative. Her most recent CT of the chest done 04/05/2013 showed that the tiny pulmonary nodules were stable over 3 years and therefore these are no longer followed on a routine basis.      PET-CT in June 2016 showed left axillary lymphadenopathy and biopsy of lymph node showed metastatic breast carcinoma, consistent with recurrence.  On 6/27/16, she underwent left lateral breast lesion excision and left axillary dissection.  Pathology showed invasive ductal carcinoma, grade 3, tumor size 1.5 cm +LVI, 6 of 31 lymph nodes were involved, ER positive (75%), MN positive (5%), HER-2 negative.  4 of 29  lymph node were positive in the axillary dissection.  There was excision of a second breast nodule that also had metastatic breast cancer.      She began chemotherapy on 8/11/16, and completed docetaxel+carboplatin x 6 cycles on 11/25/16.  She completed radiation in late February 2017.      Port was removed on 3/16/17.    She started anastrozole on 3/16/17.  Due to arthralgias, she switched to exemestane in October 2017.        INTERIM HISTORY:  Kita comes in for follow-up today.   She says that she has been doing better.  She started seeing a therapist to help with her anxiety, and that has been helping.  She notes tightness in her left axilla still, but that is not new.  She has been doing home stretching exercises, but admits to not doing them as much as she should.  She takes a Tramadol once in a while, but then gets a headache the next morning, so she doesn't like to take it often.  She says that her 22 yo son has moved out of her house, but she still worries about him, drugs and alcohol, and the friends he hangs out with.   She was certified for medical cannabis, but she ultimately decided not to do it due to cost.  She continues to take exemestane.       REVIEW OF SYSTEMS:   14 point ROS was reviewed and is negative other than as noted above in HPI.       HOME MEDICATIONS:  Current Outpatient Prescriptions   Medication Sig Dispense Refill     traMADol (ULTRAM) 50 MG tablet Take 1 tablet (50 mg) by mouth every 6 hours as needed for pain (maximum 8 tablets per day) 60 tablet 1     exemestane (AROMASIN) 25 MG tablet Take 1 tablet (25 mg) by mouth daily 90 tablet 3     diphenoxylate-atropine (LOMOTIL) 2.5-0.025 MG per tablet Take 1 tablet by mouth 4 times daily as needed for diarrhea       Calcium Carb-Cholecalciferol (CALCIUM + D3 PO)        B Complex-C TABS        calcium carbonate (TUMS) 500 MG chewable tablet Take 1 chew tab by mouth daily Reported on 4/21/2017       LORazepam (ATIVAN) 0.5 MG tablet Take 1  tablet (0.5 mg) by mouth every 4 hours as needed (Anxiety, Nausea/Vomiting or Sleep) 30 tablet 2         ALLERGIES:  Allergies   Allergen Reactions     Percocet [Oxycodone-Acetaminophen] Nausea     Adhesive Tape Blisters     REDNESS,  bandaides     Hydrocodone Nausea and Vomiting     Latex      Wound Dressing Adhesive          PAST MEDICAL HISTORY:  Past Medical History:   Diagnosis Date     Basal cell carcinoma      Breast CA (H)      Depression with anxiety      Histoplasmosis      Malignant melanoma (H)      PONV (postoperative nausea and vomiting)          PAST SURGICAL HISTORY:  Past Surgical History:   Procedure Laterality Date     BIOPSY OF SKIN LESION       BREAST SURGERY      bilat mastectomy       C RAD RESEC TONSIL/PILLARS        SECTION      times 2     CL AFF SURGICAL PATHOLOGY      left wrist     DAVINCI HYSTERECTOMY TOTAL, BILATERAL SALPINGO-OOPHORECTOMY, COMBINED  2013    Procedure: COMBINED DAVINCI HYSTERECTOMY TOTAL, SALPINGO-OOPHORECTOMY;  ROBOTIC ASSISTED TOTAL LAPAROSCOPIC HYSTERECTOMY, BILATERAL SALPINGO OOPHORECTOMY, PARTIAL VULVECTOMY ;  Surgeon: Hira Jenkins MD;  Location:  OR     DISSECT LYMPH NODE AXILLA Left 2016    Procedure: DISSECT LYMPH NODE AXILLA;  Surgeon: Hebert Driscoll MD;  Location:  OR     GENERAL SURGERY                           DATE:   &    C section     HC REVISE BREAST RECONSTRUCTION       HEMORRHOIDECTOMY       INSERT PORT VASCULAR ACCESS Right 2016    Procedure: INSERT PORT VASCULAR ACCESS;  Surgeon: Hebert Driscoll MD;  Location:  OR     MASTECTOMY       melanoma removal[      right sided neck     MOHS MICROGRAPHIC PROCEDURE       PROCTOPLASTY  2013    Procedure: PROCTOPLASTY;  PROCTOPLASTY TO REPAIR ANAL FISSURE AND STENOSIS;  Surgeon: Goldberg, Stanley Morton, MD;  Location:  SD     REMOVE PORT VASCULAR ACCESS N/A 3/16/2017    Procedure: REMOVE PORT VASCULAR ACCESS;  Surgeon: Hebert Driscoll  MD Jason;  Location: [facility]     VULVECTOMY SIMPLE  2013    Procedure: VULVECTOMY SIMPLE;;  Surgeon: Hira Jenkins MD;  Location:  OR         SOCIAL HISTORY:  Social History     Social History     Marital status:      Spouse name: N/A     Number of children: N/A     Years of education: N/A     Occupational History     Not on file.     Social History Main Topics     Smoking status: Former Smoker     Packs/day: 1.50     Years: 4.00     Types: Cigarettes     Quit date: 1980     Smokeless tobacco: Never Used     Alcohol use Yes      Comment: occasionally     Drug use: No     Sexual activity: Not on file     Other Topics Concern     Not on file     Social History Narrative         FAMILY HISTORY:  Family History   Problem Relation Age of Onset     CANCER Mother      brain     Other Cancer Mother      Cancer - colorectal Father      Hypertension Father      Colon Cancer Father      Other Cancer Sister      CANCER Maternal Aunt      breast     CANCER Other      breast in cousin     Other Cancer Maternal Grandfather      Breast Cancer Paternal Grandmother      Other Cancer Paternal Grandfather          PHYSICAL EXAM:  Vital signs:  /82 (BP Location: Right arm, Patient Position: Sitting, Cuff Size: Adult Regular)  Pulse 78  Temp 98.3  F (36.8  C) (Oral)  Resp 16  Wt 59.8 kg (131 lb 12.8 oz)  LMP 2006  SpO2 95%  BMI 21.27 kg/m2   ECO  GENERAL/CONSTITUTIONAL: No acute distress.  EYES: No scleral icterus.  LYMPH: No anterior cervical, posterior cervical, supraclavicular, or axillary adenopathy.   RESPIRATORY: Clear to auscultation bilaterally. No crackles or wheezing.   CARDIOVASCULAR: Regular rate and rhythm without murmurs, gallops, or rubs.  GASTROINTESTINAL: No tenderness. The patient has normal bowel sounds. No guarding.  No distention.  BREAST: s/p bilateral mastectomy with implants in place.      MUSCULOSKELETAL: Warm and well-perfused.   NEUROLOGIC: Alert, oriented,  answers questions appropriately.  INTEGUMENTARY: No jaundice.  Numerous scattered moles.    GAIT: Steady, does not use assistive device  Port has been removed.      LABS:  CBC RESULTS:   Recent Labs   Lab Test  01/26/18   0745   WBC  4.2   RBC  4.59   HGB  14.7   HCT  42.3   MCV  92   MCH  32.0   MCHC  34.8   RDW  12.9   PLT  203     Recent Labs   Lab Test  01/26/18   0745  03/07/17   0820   NA  139  139   POTASSIUM  4.1  4.2   CHLORIDE  105  106   CO2  28  27   ANIONGAP  6  6   GLC  76  80   BUN  13  14   CR  0.80  0.80   YANG  9.3  8.7     Lab Results   Component Value Date    AST 14 01/26/2018     Lab Results   Component Value Date    ALT 17 01/26/2018     No results found for: BILICONJ   Lab Results   Component Value Date    BILITOTAL 0.8 01/26/2018     Lab Results   Component Value Date    ALBUMIN 3.6 01/26/2018     Lab Results   Component Value Date    PROTTOTAL 7.2 01/26/2018      Lab Results   Component Value Date    ALKPHOS 69 01/26/2018     Vitamin D: 29    IMAGING:  PET-CT 12/4/17:  1. No evidence of recurrent or metastatic disease in the neck, chest,  abdomen or pelvis.   2. Postop changes from bilateral mastectomy and implant  reconstruction.   3. Bibasilar lung nodules with dependent atelectasis and prominent  left lung base calcified granuloma are stable. No associated abnormal  FDG activity. Continued surveillance as clinically warranted.  4. Stable left hepatic lobe cyst.      ASSESSMENT/PLAN:  Kita Smith is a 57 year old female:      1) Left breast cancer: diagnosed in 2006, s/p bilateral mastectomy and chemotherapy and hormone therapy.  She is known to be BRCA-2 positive and has undergone hysterectomy and bilateral salpingo-oophorectomy on 04/06/2013 for prophylaxis of ovarian and uterine cancer.  Then, she had recurrence, s/p left lateral breast lesion excision and left axillary dissection on 6/27/16.  Pathology showed invasive ductal carcinoma, grade 3, tumor size 1.5 cm +LVI, 6 of 31  lymph nodes were involved, ER positive (75%), MO positive (5%), HER-2 negative.  4 of 29 lymph node were positive in the axillary dissection.  There was excision of a second breast nodule that also had metastatic breast cancer.      She has completed chemotherapy and radiation.  She started anastrozole on 3/7/17.  She switched to exemestane in October 2017 due to arthralgias.    -continue exemestane 25 mg daily  -Kita wants routine imaging.  With her recurrent cancer and high-risk BRCA, it would be reasonable to obtain imaging.  She initially wanted annual PET-CT's, but after thinking about things and considering the risks, she would like to have them every 2-3 years or if new symptoms occur.  She last had a PET-CT in December 2017 due to a lump at the rib area.  It showed no evidence of disease; there were post-operative changes seen.  -she requests to follow CBC and CMP, as well as tumor marker  -RTC in 4 months with labs    2) Left axilla tightness: No palpable lump, PET-CT was negative.  It is likely post-surgical scarring.    -she is doing physical therapy exercises at home and will try to do them more consistently.  -she takes tramadol occasionally.    -she was certified for medical cannabis, but she decided not to do it due to cost.      3) Melanoma: She has history of malignant melanoma x 2, stage IA of the right neck and stage 0 of the left temple.    -she follows every 6 months with dermatologist close to her home    4) Pulmonary nodules: were found in 2010 that were thought granulomatous and PET negative at the time, and have been stable on subsequent scans.     5) Osteopenia: osteopenia of both hips and mild osteopenia of the lumbar spine  -take calcium and vitamin D  -repeat DEXA scan in March 2019    6) Vitamin D:   -she requests to have levels checked periodically    7) Left hepatic lobe cyst: stable on imaging    8) Anxiety: She is now seeing a therapist, which has been helpful.      I spent a  total of 25 minutes with the patient, with over >50% of the time in counseling and/or coordination of care.      Preeti Jasso MD  Hematology/Oncology  [facility]          Again, thank you for allowing me to participate in the care of your patient.        Sincerely,        Preeti Jasso MD

## 2018-02-02 NOTE — MR AVS SNAPSHOT
After Visit Summary   2/2/2018    Kita Smith    MRN: [id number]           Patient Information     Date Of Birth          1960        Visit Information        Provider Department      2/2/2018 8:00 AM Preeti Jasso MD [facility] [place]        Today's Diagnoses     Malignant neoplasm of overlapping sites of left breast in female, estrogen receptor positive (H)    -  1      Care Instructions    -continue taking exemestane  -return to clinic in 4 months with labs a few days prior  Scheduled/rodger          Follow-ups after your visit        Your next 10 appointments already scheduled     Jun 01, 2018  8:00 AM CDT   Return Visit with  Oncology Nurse   [facility] ([facility])    [facility] [place]  [address]  [place] [address] [phone]            Jun 05, 2018  8:00 AM CDT   Return Visit with Preeti Jasso MD   [facility] ([facility])    [facility]  [address]  [place] [address] [phone]              Future tests that were ordered for you today     Open Future Orders        Priority Expected Expires Ordered    CBC with platelets differential Routine 6/2/2018 2/2/2019 2/2/2018    Comprehensive metabolic panel Routine 6/2/2018 2/2/2019 2/2/2018    Ca27.29  breast tumor marker Routine 6/2/2018 2/2/2019 2/2/2018    Vitamin D Deficiency Routine 6/2/2018 2/2/2019 2/2/2018            Who to contact     If you have questions or need follow up information about today's clinic visit or your schedule please contact [facility] directly at [phone].  Normal or non-critical lab and imaging results will be communicated to you by MyChart, letter or phone within 4 business days after the clinic has received the results. If you do not hear from us within 7 days, please contact the clinic through MyChart or phone. If you have a  "critical or abnormal lab result, we will notify you by phone as soon as possible.  Submit refill requests through "[employer] or call your pharmacy and they will forward the refill request to us. Please allow 3 business days for your refill to be completed.          Additional Information About Your Visit        Delta IDhart Information     "[employer] lets you send messages to your doctor, view your test results, renew your prescriptions, schedule appointments and more. To sign up, go to [email or web address] . Click on \"Log in\" on the left side of the screen, which will take you to the Welcome page. Then click on \"Sign up Now\" on the right side of the page.     You will be asked to enter the access code listed below, as well as some personal information. Please follow the directions to create your username and password.     Your access code is: [email or web address]  Expires: 2018  7:48 AM     Your access code will  in 90 days. If you need help or a new code, please call your [place] clinic or [phone].        Care EveryWhere ID     This is your Care EveryWhere ID. This could be used by other organizations to access your [facility] [place] medical records  [id number]        Your Vitals Were     Pulse Temperature Respirations Last Period Pulse Oximetry BMI (Body Mass Index)    78 98.3  F (36.8  C) (Oral) 16 2006 95% 21.27 kg/m2       Blood Pressure from Last 3 Encounters:   18 129/82   17 121/88   10/20/17 119/85    Weight from Last 3 Encounters:   18 59.8 kg (131 lb 12.8 oz)   17 59 kg (130 lb)   10/20/17 57.6 kg (127 lb)               Primary Care Provider Office Phone # Fax #    Checo Crockett [phone]       [address] [facility] [address]        Equal Access to Services     STEPHANIE VILLEGAS AH: Blake Davis, nereyda dorantes, shayne au la'aan ah. So [facility] " [phone].    ATENCIÓN: Si estela garcia, tiene a jasso disposición servicios gratuitos de asistencia lingüística. Darius gomez [phone].    We comply with applicable federal civil rights laws and [place] laws. We do not discriminate on the basis of race, color, national origin, age, disability, sex, sexual orientation, or gender identity.            Thank you!     Thank you for choosing [facility]  for your care. Our goal is always to provide you with excellent care. Hearing back from our patients is one way we can continue to improve our services. Please take a few minutes to complete the written survey that you may receive in the mail after your visit with us. Thank you!             Your Updated Medication List - Protect others around you: Learn how to safely use, store and throw away your medicines at www.disposemymeds.org.          This list is accurate as of 2/2/18  8:41 AM.  Always use your most recent med list.                   Brand Name Dispense Instructions for use Diagnosis    B Complex-C Tabs           CALCIUM + D3 PO           calcium carbonate 500 MG chewable tablet    TUMS     Take 1 chew tab by mouth daily Reported on 4/21/2017        diphenoxylate-atropine 2.5-0.025 MG per tablet    LOMOTIL     Take 1 tablet by mouth 4 times daily as needed for diarrhea        exemestane 25 MG tablet    AROMASIN    90 tablet    Take 1 tablet (25 mg) by mouth daily    Malignant neoplasm of overlapping sites of left breast in female, estrogen receptor positive (H)       LORazepam 0.5 MG tablet    ATIVAN    30 tablet    Take 1 tablet (0.5 mg) by mouth every 4 hours as needed (Anxiety, Nausea/Vomiting or Sleep)    Malignant neoplasm of overlapping sites of left female breast (H)       traMADol 50 MG tablet    ULTRAM    60 tablet    Take 1 tablet (50 mg) by mouth every 6 hours as needed for pain (maximum 8 tablets per day)    Malignant neoplasm of overlapping sites of left breast in female, estrogen  receptor positive (H)

## 2018-02-02 NOTE — PROGRESS NOTES
"Oncology Rooming Note    February 2, 2018 7:58 AM   Kita Smith is a 57 year old female who presents for:    Chief Complaint   Patient presents with     Oncology Clinic Visit     Breast cancer, female      Initial Vitals: /82 (BP Location: Right arm, Patient Position: Sitting, Cuff Size: Adult Regular)  Pulse 78  Temp 98.3  F (36.8  C) (Oral)  Resp 16  Wt 59.8 kg (131 lb 12.8 oz)  LMP 12/01/2006  SpO2 95%  BMI 21.27 kg/m2 Estimated body mass index is 21.27 kg/(m^2) as calculated from the following:    Height as of 6/21/17: 1.676 m (5' 6\").    Weight as of this encounter: 59.8 kg (131 lb 12.8 oz). Body surface area is 1.67 meters squared.  No Pain (0) Comment: Data Unavailable   Patient's last menstrual period was 12/01/2006.  Allergies reviewed: Yes  Medications reviewed: Yes    Medications: Medication refills not needed today.  Pharmacy name entered into [employer]: [id number] - [place] [address] [address] [place] [address]    Clinical concerns: None                    4 minutes for nursing intake (face to face time)     Kajal Bailey MA            "

## 2018-05-01 NOTE — PROGRESS NOTES
Outpatient Occupational Therapy Discharge Note     Patient: Kita Smith  : 1960    Beginning/End Dates of Reporting Period:  2016 to 2017    Data:  Patient referred to edema treatment program by Preeti Jasso MD for post-op treatment s/p L breast CA with LND.    Assessment: Patient attended multiple weekly treatment sessions, stated she felt confident with HP, including 15-20 mmHg compression sleeve for day wear with quilted directional flow garment for night wear, self-MLD, HEP.  Patient phoned this clinic to cancel final follow-up appointment, stating she was doing well, and would seek a new MD referral if she experienced exacerbation of lymphedema symptoms.  Unable to determine final progress toward goals.    Plan: Discharge from edema therapy.

## 2018-06-05 ENCOUNTER — ONCOLOGY VISIT (OUTPATIENT)
Dept: ONCOLOGY | Facility: CLINIC | Age: 58
End: 2018-06-05
Attending: INTERNAL MEDICINE
Payer: COMMERCIAL

## 2018-06-05 ENCOUNTER — HOSPITAL ENCOUNTER (OUTPATIENT)
Facility: CLINIC | Age: 58
Setting detail: SPECIMEN
Discharge: HOME | End: 2018-06-05
Attending: INTERNAL MEDICINE | Admitting: INTERNAL MEDICINE
Payer: COMMERCIAL

## 2018-06-05 VITALS
SYSTOLIC BLOOD PRESSURE: 136 MMHG | HEART RATE: 74 BPM | BODY MASS INDEX: 22.5 KG/M2 | TEMPERATURE: 98.4 F | RESPIRATION RATE: 12 BRPM | OXYGEN SATURATION: 97 % | DIASTOLIC BLOOD PRESSURE: 93 MMHG | WEIGHT: 139.4 LBS

## 2018-06-05 DIAGNOSIS — C50.812 MALIGNANT NEOPLASM OF OVERLAPPING SITES OF LEFT BREAST IN FEMALE, ESTROGEN RECEPTOR POSITIVE (H): Primary | ICD-10-CM

## 2018-06-05 DIAGNOSIS — G44.89 OTHER HEADACHE SYNDROME: ICD-10-CM

## 2018-06-05 DIAGNOSIS — Z17.0 MALIGNANT NEOPLASM OF OVERLAPPING SITES OF LEFT BREAST IN FEMALE, ESTROGEN RECEPTOR POSITIVE (H): Primary | ICD-10-CM

## 2018-06-05 PROBLEM — R51.9 HEADACHE: Status: ACTIVE | Noted: 2018-06-05

## 2018-06-05 LAB
ALBUMIN SERPL-MCNC: 3.6 G/DL (ref 3.4–5)
ALP SERPL-CCNC: 74 U/L (ref 40–150)
ALT SERPL W P-5'-P-CCNC: 19 U/L (ref 0–50)
ANION GAP SERPL CALCULATED.3IONS-SCNC: 6 MMOL/L (ref 3–14)
AST SERPL W P-5'-P-CCNC: 17 U/L (ref 0–45)
BASOPHILS # BLD AUTO: 0 10E9/L (ref 0–0.2)
BASOPHILS NFR BLD AUTO: 0.9 %
BILIRUB SERPL-MCNC: 0.6 MG/DL (ref 0.2–1.3)
BUN SERPL-MCNC: 10 MG/DL (ref 7–30)
CALCIUM SERPL-MCNC: 9.1 MG/DL (ref 8.5–10.1)
CANCER AG27-29 SERPL-ACNC: 17 U/ML (ref 0–39)
CHLORIDE SERPL-SCNC: 107 MMOL/L (ref 94–109)
CO2 SERPL-SCNC: 26 MMOL/L (ref 20–32)
CREAT SERPL-MCNC: 0.76 MG/DL (ref 0.52–1.04)
DIFFERENTIAL METHOD BLD: NORMAL
EOSINOPHIL # BLD AUTO: 0.1 10E9/L (ref 0–0.7)
EOSINOPHIL NFR BLD AUTO: 1.7 %
ERYTHROCYTE [DISTWIDTH] IN BLOOD BY AUTOMATED COUNT: 13.2 % (ref 10–15)
GFR SERPL CREATININE-BSD FRML MDRD: 78 ML/MIN/1.7M2
GLUCOSE SERPL-MCNC: 85 MG/DL (ref 70–99)
HCT VFR BLD AUTO: 39.1 % (ref 35–47)
HGB BLD-MCNC: 13.4 G/DL (ref 11.7–15.7)
IMM GRANULOCYTES # BLD: 0 10E9/L (ref 0–0.4)
IMM GRANULOCYTES NFR BLD: 0 %
LYMPHOCYTES # BLD AUTO: 1.3 10E9/L (ref 0.8–5.3)
LYMPHOCYTES NFR BLD AUTO: 31 %
MCH RBC QN AUTO: 31.4 PG (ref 26.5–33)
MCHC RBC AUTO-ENTMCNC: 34.3 G/DL (ref 31.5–36.5)
MCV RBC AUTO: 92 FL (ref 78–100)
MONOCYTES # BLD AUTO: 0.3 10E9/L (ref 0–1.3)
MONOCYTES NFR BLD AUTO: 8.1 %
NEUTROPHILS # BLD AUTO: 2.5 10E9/L (ref 1.6–8.3)
NEUTROPHILS NFR BLD AUTO: 58.3 %
NRBC # BLD AUTO: 0 10*3/UL
NRBC BLD AUTO-RTO: 0 /100
PLATELET # BLD AUTO: 215 10E9/L (ref 150–450)
POTASSIUM SERPL-SCNC: 4.2 MMOL/L (ref 3.4–5.3)
PROT SERPL-MCNC: 7 G/DL (ref 6.8–8.8)
RBC # BLD AUTO: 4.27 10E12/L (ref 3.8–5.2)
SODIUM SERPL-SCNC: 139 MMOL/L (ref 133–144)
WBC # BLD AUTO: 4.2 10E9/L (ref 4–11)

## 2018-06-05 PROCEDURE — 85025 COMPLETE CBC W/AUTO DIFF WBC: CPT | Performed by: INTERNAL MEDICINE

## 2018-06-05 PROCEDURE — 99214 OFFICE O/P EST MOD 30 MIN: CPT | Performed by: INTERNAL MEDICINE

## 2018-06-05 PROCEDURE — G0463 HOSPITAL OUTPT CLINIC VISIT: HCPCS

## 2018-06-05 PROCEDURE — 86300 IMMUNOASSAY TUMOR CA 15-3: CPT | Performed by: INTERNAL MEDICINE

## 2018-06-05 PROCEDURE — 80053 COMPREHEN METABOLIC PANEL: CPT | Performed by: INTERNAL MEDICINE

## 2018-06-05 PROCEDURE — 82306 VITAMIN D 25 HYDROXY: CPT | Performed by: INTERNAL MEDICINE

## 2018-06-05 RX ORDER — DULOXETIN HYDROCHLORIDE 30 MG/1
30 CAPSULE, DELAYED RELEASE ORAL 2 TIMES DAILY
COMMUNITY

## 2018-06-05 RX ORDER — BUSPIRONE HYDROCHLORIDE 15 MG/1
15 TABLET ORAL 2 TIMES DAILY
COMMUNITY

## 2018-06-05 ASSESSMENT — PAIN SCALES - GENERAL: PAINLEVEL: NO PAIN (0)

## 2018-06-05 NOTE — PROGRESS NOTES
"Oncology Rooming Note    June 5, 2018 8:12 AM   Kita Smith is a 57 year old female who presents for:    Chief Complaint   Patient presents with     Oncology Clinic Visit     Initial Vitals: BP (!) 136/93 (BP Location: Right arm, Patient Position: Chair, Cuff Size: Adult Regular)  Pulse 74  Temp 98.4  F (36.9  C) (Oral)  Resp 12  Wt 63.2 kg (139 lb 6.4 oz)  LMP 12/01/2006  SpO2 97%  BMI 22.5 kg/m2 Estimated body mass index is 22.5 kg/(m^2) as calculated from the following:    Height as of 6/21/17: 1.676 m (5' 6\").    Weight as of this encounter: 63.2 kg (139 lb 6.4 oz). Body surface area is 1.72 meters squared.  No Pain (0) Comment: Data Unavailable   Patient's last menstrual period was 12/01/2006.  Allergies reviewed: Yes  Medications reviewed: Yes    Medications: Medication refills not needed today.  Pharmacy name entered into [employer]: [id number] - [place] [address] [place] [address]    Clinical concerns: None     5 minutes for nursing intake (face to face time)     Damari Monzon CMA              "
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
## 2018-06-05 NOTE — MR AVS SNAPSHOT
After Visit Summary   6/5/2018    Kita Smith    MRN: [id number]           Patient Information     Date Of Birth          1960        Visit Information        Provider Department      6/5/2018 8:00 AM Preeti Jasso MD [facility]        Today's Diagnoses     Malignant neoplasm of overlapping sites of left breast in female, estrogen receptor positive (H)    -  1    Other headache syndrome          Care Instructions    -labs today  -schedule MRI brain this week  -return to clinic in 6 months with labs a few days prior          Follow-ups after your visit        Your next 10 appointments already scheduled     Jun 08, 2018  1:45 PM CDT   MR BRAIN W/O & W CONTRAST with SHMRP2   [facility] ([facility])    [address] [place] [id number] [phone]           Take your medicines as usual, unless your doctor tells you not to. Bring a list of your current medicines to your exam (including vitamins, minerals and over-the-counter drugs).  You may or may not receive intravenous (IV) contrast for this exam pending the discretion of the Radiologist.  You do not need to do anything special to prepare.  The MRI machine uses a strong magnet. Please wear clothes without metal (snaps, zippers). A sweatsuit works well, or we may give you a hospital gown.  Please remove any body piercings and hair extensions before you arrive. You will also remove watches, jewelry, hairpins, wallets, dentures, partial dental plates and hearing aids. You may wear contact lenses, and you may be able to wear your rings. We have a safe place to keep your personal items, but it is safer to leave them at home.  **IMPORTANT** THE INSTRUCTIONS BELOW ARE ONLY FOR THOSE PATIENTS WHO HAVE BEEN PRESCRIBED SEDATION OR GENERAL ANESTHESIA DURING THEIR MRI PROCEDURE:  IF YOUR DOCTOR PRESCRIBED ORAL SEDATION (take medicine to help you relax during your exam):   You must get  the medicine from your doctor (oral medication) before you arrive. Bring the medicine to the exam. Do not take it at home. You ll be told when to take it upon arriving for your exam.   Arrive one hour early. Bring someone who can take you home after the test. Your medicine will make you sleepy. After the exam, you may not drive, take a bus or take a taxi by yourself.  IF YOUR DOCTOR PRESCRIBED IV SEDATION:   Arrive one hour early. Bring someone who can take you home after the test. Your medicine will make you sleepy. After the exam, you may not drive, take a bus or take a taxi by yourself.   No eating 6 hours before your exam. You may have clear liquids up until 4 hours before your exam. (Clear liquids include water, clear tea, black coffee and fruit juice without pulp.)  IF YOUR DOCTOR PRESCRIBED ANESTHESIA (be asleep for your exam):   Arrive 1 1/2 hours early. Bring someone who can take you home after the test. You may not drive, take a bus or take a taxi by yourself.   No eating 8 hours before your exam. You may have clear liquids up until 4 hours before your exam. (Clear liquids include water, clear tea, black coffee and fruit juice without pulp.)   You will spend four to five hours in the recovery room.  Please call the Imaging Department at your exam site with any questions.            Dec 07, 2018  8:00 AM CST   Return Visit with  Oncology Nurse   [facility] ([facility])    [facility]  [address]  [facility] [address]   [phone]            Dec 07, 2018  9:00 AM CST   Return Visit with Preeti Jasso MD   [facility] ([facility])    [facility]  [address]  [facility] [address]   [phone]              Future tests that were ordered for you today     Open Future Orders        Priority Expected Expires Ordered    CBC with platelets differential Routine 12/5/2018  6/5/2019 6/5/2018    Comprehensive metabolic panel Routine 12/5/2018 6/5/2019 6/5/2018    Ca27.29  breast tumor marker Routine 12/5/2018 6/5/2019 6/5/2018    Vitamin D Deficiency Routine 12/5/2018 6/5/2019 6/5/2018    MR Brain w/o & w Contrast Routine  6/5/2019 6/5/2018            Who to contact     If you have questions or need follow up information about today's clinic visit or your schedule please contact [facility] directly at [phone].  Normal or non-critical lab and imaging results will be communicated to you by MyChart, letter or phone within 4 business days after the clinic has received the results. If you do not hear from us within 7 days, please contact the clinic through MyChart or phone. If you have a critical or abnormal lab result, we will notify you by phone as soon as possible.  Submit refill requests through BPT or call your pharmacy and they will forward the refill request to us. Please allow 3 business days for your refill to be completed.          Additional Information About Your Visit        Care EveryWhere ID     This is your Care EveryWhere ID. This could be used by other organizations to access your [facility] medical records  [id number]        Your Vitals Were     Pulse Temperature Respirations Last Period Pulse Oximetry BMI (Body Mass Index)    74 98.4  F (36.9  C) (Oral) 12 12/01/2006 97% 22.5 kg/m2       Blood Pressure from Last 3 Encounters:   06/05/18 (!) 136/93   02/02/18 129/82   12/01/17 121/88    Weight from Last 3 Encounters:   06/05/18 63.2 kg (139 lb 6.4 oz)   02/02/18 59.8 kg (131 lb 12.8 oz)   12/01/17 59 kg (130 lb)              We Performed the Following     Ca27.29  breast tumor marker     CBC with platelets differential     Comprehensive metabolic panel     Vitamin D Deficiency          Today's Medication Changes          These changes are accurate as of 6/5/18  8:57 AM.  If you have any questions, ask your nurse or doctor.               Stop taking  these medicines if you haven't already. Please contact your care team if you have questions.     diphenoxylate-atropine 2.5-0.025 MG per tablet   Commonly known as:  LOMOTIL           LORazepam 0.5 MG tablet   Commonly known as:  ATIVAN                    Primary Care Provider Office Phone # Fax #    Checo Crockett [phone]       [facility] [place] [facility] [address] [place] [address]        Equal Access to Services     STEPHANIE VILLEGAS : Hadii aad ku hadasho Soomaali, waaxda luqadaha, qaybta kaalmada adeegyada, waxay idiin hayaan adeeg kharash lalaurel . So [facility] [phone].    ATENCIÓN: Si habla español, tiene a jasso disposición servicios gratuitos de asistencia lingüística. [facility] [phone].    We comply with applicable federal civil rights laws and [place] laws. We do not discriminate on the basis of race, color, national origin, age, disability, sex, sexual orientation, or gender identity.            Thank you!     Thank you for choosing [facility]  for your care. Our goal is always to provide you with excellent care. Hearing back from our patients is one way we can continue to improve our services. Please take a few minutes to complete the written survey that you may receive in the mail after your visit with us. Thank you!             Your Updated Medication List - Protect others around you: Learn how to safely use, store and throw away your medicines at www.disposemymeds.org.          This list is accurate as of 6/5/18  8:57 AM.  Always use your most recent med list.                   Brand Name Dispense Instructions for use Diagnosis    B Complex-C Tabs           busPIRone 15 MG tablet    BUSPAR     Take 15 mg by mouth 2 times daily        CALCIUM + D3 PO           calcium carbonate 500 MG chewable tablet    TUMS     Take 1 chew tab by mouth daily Reported on 4/21/2017        CYMBALTA 30 MG EC capsule   Generic drug:  DULoxetine      Take 30 mg by mouth 2  times daily        exemestane 25 MG tablet    AROMASIN    90 tablet    Take 1 tablet (25 mg) by mouth daily    Malignant neoplasm of overlapping sites of left breast in female, estrogen receptor positive (H)       melatonin 1 MG Tabs tablet      Take 1 mg by mouth nightly as needed for sleep        traMADol 50 MG tablet    ULTRAM    60 tablet    Take 1 tablet (50 mg) by mouth every 6 hours as needed for pain (maximum 8 tablets per day)    Malignant neoplasm of overlapping sites of left breast in female, estrogen receptor positive (H)

## 2018-06-06 LAB — DEPRECATED CALCIDIOL+CALCIFEROL SERPL-MC: 31 UG/L (ref 20–75)

## 2018-06-08 ENCOUNTER — HOSPITAL ENCOUNTER (OUTPATIENT)
Dept: MRI IMAGING | Facility: CLINIC | Age: 58
Discharge: HOME | End: 2018-06-08
Attending: INTERNAL MEDICINE | Admitting: INTERNAL MEDICINE
Payer: COMMERCIAL

## 2018-06-08 DIAGNOSIS — G44.89 OTHER HEADACHE SYNDROME: ICD-10-CM

## 2018-06-08 DIAGNOSIS — Z17.0 MALIGNANT NEOPLASM OF OVERLAPPING SITES OF LEFT BREAST IN FEMALE, ESTROGEN RECEPTOR POSITIVE (H): ICD-10-CM

## 2018-06-08 DIAGNOSIS — C50.812 MALIGNANT NEOPLASM OF OVERLAPPING SITES OF LEFT BREAST IN FEMALE, ESTROGEN RECEPTOR POSITIVE (H): ICD-10-CM

## 2018-06-08 PROCEDURE — A9585 GADOBUTROL INJECTION: HCPCS | Performed by: INTERNAL MEDICINE

## 2018-06-08 PROCEDURE — 70553 MRI BRAIN STEM W/O & W/DYE: CPT

## 2018-06-08 PROCEDURE — 25000128 H RX IP 250 OP 636: Performed by: INTERNAL MEDICINE

## 2018-06-08 RX ORDER — GADOBUTROL 604.72 MG/ML
6 INJECTION INTRAVENOUS ONCE
Status: COMPLETED | OUTPATIENT
Start: 2018-06-08 | End: 2018-06-08

## 2018-06-08 RX ADMIN — GADOBUTROL 6 ML: 604.72 INJECTION INTRAVENOUS at 14:28

## 2018-10-01 DIAGNOSIS — C50.812 MALIGNANT NEOPLASM OF OVERLAPPING SITES OF LEFT BREAST IN FEMALE, ESTROGEN RECEPTOR POSITIVE (H): Primary | ICD-10-CM

## 2018-10-01 DIAGNOSIS — Z17.0 MALIGNANT NEOPLASM OF OVERLAPPING SITES OF LEFT BREAST IN FEMALE, ESTROGEN RECEPTOR POSITIVE (H): Primary | ICD-10-CM

## 2018-10-01 RX ORDER — EXEMESTANE 25 MG/1
25 TABLET ORAL DAILY
Qty: 90 TABLET | Refills: 3 | Status: SHIPPED | OUTPATIENT
Start: 2018-10-01 | End: 2018-12-07

## 2018-11-19 NOTE — OP NOTE
General Surgery Operative Note    PREOPERATIVE DIAGNOSIS:  BREAST CANCER, VENOUS PORT    POSTOPERATIVE DIAGNOSIS:  SAME    PROCEDURE:   Procedure(s):  REMOVE PORT VASCULAR ACCESS    ANESTHESIA:  MAC and local    PREOPERATIVE MEDICATIONS:  Ancef    SURGEON:  Hebert Driscoll MD    ASSISTANT:  FABIO Cho    INDICATIONS:  Done with Chemo    PROCEDURE:  The patient was taken to the operating suite and given IV sedation.  The port site was prepped and draped in a sterile fashion.  Surgeon initiated timeout was acknowledged.  We anesthetized the area around the previous port scar and incised through it.  We dissected down to the port capsule and entered it.  The catheter was grasped and removed.  A stitch was placed in the tract.  The port was removed and the deep tissues reaproximated with 3-0 Vicryl.  The skin edges were reaproximated with 4-0 Vicryl and Steri-Strips.  The patient was awakened and taken to the PACU in stable condition.  At the conclusion of the case, all lap and needle counts were correct.      ESTIMATED BLOOD LOSS:  2 mL    Hebert Driscoll MD          Verified Results  COMP METABOLIC PNL 29May2018 12:01AM TOMMYRITAYLORCYDNEYTINYRU     Test Name Result Flag Reference   SODIUM 144 mmol/L  135-145   POTASSIUM 4.4 mmol/L  3.4-5.1   CHLORIDE 109 mmol/L H    CARBON DIOXIDE 26 mmol/L  21-32   ANION GAP 13 mmol/L  10-20   GLUCOSE 94 mg/dl  65-99   BUN 18 mg/dl  6-20   CREATININE 0.61 mg/dl  0.51-0.95   GFR EST.AFRICAN AMER >90     eGFR results = or >90 mL/min/1.73m2 = Normal kidney function.   GFR EST.NONAFRI AMER >90     eGFR results = or >90 mL/min/1.73m2 = Normal kidney function.   BUN/CREATININE RATIO 30 H 7-25   BILIRUBIN TOTAL 0.6 mg/dl  0.2-1.0   GOT/AST 22 Units/L  <38   ALKALINE PHOSPHATASE 112 Units/L     ALBUMIN 3.7 g/dl  3.6-5.1   TOTAL PROTEIN 6.4 g/dl  6.4-8.2   GLOBULIN (CALCULATED) 2.7 g/dl  2.0-4.0   A/G RATIO 1.4  1.0-2.4   CALCIUM 9.7 mg/dl  8.4-10.2   GPT/ALT 36 Units/L  <79   FASTING STATUS 0 hrs       MAGNESIUM 29May2018 12:01AM RU MEDELLIN     Test Name Result Flag Reference   MAGNESIUM 2.5 mg/dl H 1.7-2.4     PHOSPHORUS 29May2018 12:01AM RU MEDELLIN     Test Name Result Flag Reference   PHOSPHORUS 3.3 mg/dl  2.4-4.7     HEMOGLOBIN A1C GLYCOSYLATED 29May2018 12:01AM RU MEDELLIN     Test Name Result Flag Reference   HEMOGLOBIN A1C GLYH 5.7 % H 4.5-5.6   ----DIABETIC SCREENING---  NON DIABETIC                 <5.7%  INCREASED RISK                5.7-6.4%  DIAGNOSTIC FOR DIABETES      >6.4%     ----DIABETIC CONTROL---     A1C%           eAG mg/dL  6.0            126  6.5            140  7.0            154  7.5            169  8.0            183  8.5            197  9.0            212  9.5            226  10.0           240     INSULIN, FASTING 29May2018 12:01AM RU MEDELLIN     Test Name Result Flag Reference   INSULIN, FASTING 10 mUnits/L  3-28     LIPID PNL [id number] 12:01AM RU MEDELLIN     Test Name Result Flag Reference   FASTING STATUS 0 hrs     CHOLESTEROL 124 mg/dl  <200   Desirable             <200  Borderline High      200 to 239  High                 >=240   HDL CHOLESTEROL 42 mg/dl L >49   Low            <40  Borderline Low 40 to 49  Near Optimal   50 to 59  Optimal        >=60   TRIGLYCERIDES 85 mg/dl  <150   Normal                   <150  Borderline High          150 to 199  High                     200 to 499  Very High                >=500   LDL CHOLESTEROL (CALCULATED) 65 mg/dl  <130   OPTIMAL               <100  NEAR OPTIMAL          100-129  BORDERLINE HIGH       130-159  HIGH                  160-189  VERY HIGH             >=190   NON-HDL CHOLESTEROL 82 mg/dl     Therapeutic Target:  CHD and risk equivalents <130  Multiple risk factors    <160  0 to 1 risk factors      <190   CHOLESTEROL/HDL RATIO 3.0  <4.5       Message   she had blood work done for another physician. Let her know, that results are ready for her to .

## 2018-12-07 ENCOUNTER — HOSPITAL ENCOUNTER (OUTPATIENT)
Facility: CLINIC | Age: 58
Setting detail: SPECIMEN
Discharge: HOME | End: 2018-12-07
Attending: INTERNAL MEDICINE | Admitting: INTERNAL MEDICINE
Payer: COMMERCIAL

## 2018-12-07 ENCOUNTER — ONCOLOGY VISIT (OUTPATIENT)
Dept: ONCOLOGY | Facility: CLINIC | Age: 58
End: 2018-12-07
Attending: INTERNAL MEDICINE
Payer: COMMERCIAL

## 2018-12-07 VITALS
RESPIRATION RATE: 16 BRPM | BODY MASS INDEX: 23.57 KG/M2 | OXYGEN SATURATION: 99 % | WEIGHT: 146 LBS | HEART RATE: 84 BPM | SYSTOLIC BLOOD PRESSURE: 153 MMHG | TEMPERATURE: 98.1 F | DIASTOLIC BLOOD PRESSURE: 91 MMHG

## 2018-12-07 DIAGNOSIS — M85.89 OSTEOPENIA OF MULTIPLE SITES: ICD-10-CM

## 2018-12-07 DIAGNOSIS — Z17.0 MALIGNANT NEOPLASM OF OVERLAPPING SITES OF LEFT BREAST IN FEMALE, ESTROGEN RECEPTOR POSITIVE (H): ICD-10-CM

## 2018-12-07 DIAGNOSIS — C50.812 MALIGNANT NEOPLASM OF OVERLAPPING SITES OF LEFT BREAST IN FEMALE, ESTROGEN RECEPTOR POSITIVE (H): ICD-10-CM

## 2018-12-07 LAB
ALBUMIN SERPL-MCNC: 4 G/DL (ref 3.4–5)
ALP SERPL-CCNC: 85 U/L (ref 40–150)
ALT SERPL W P-5'-P-CCNC: 23 U/L (ref 0–50)
ANION GAP SERPL CALCULATED.3IONS-SCNC: 6 MMOL/L (ref 3–14)
AST SERPL W P-5'-P-CCNC: 18 U/L (ref 0–45)
BASOPHILS # BLD AUTO: 0.1 10E9/L (ref 0–0.2)
BASOPHILS NFR BLD AUTO: 1.1 %
BILIRUB SERPL-MCNC: 0.6 MG/DL (ref 0.2–1.3)
BUN SERPL-MCNC: 11 MG/DL (ref 7–30)
CALCIUM SERPL-MCNC: 9.5 MG/DL (ref 8.5–10.1)
CANCER AG27-29 SERPL-ACNC: 28 U/ML (ref 0–39)
CHLORIDE SERPL-SCNC: 103 MMOL/L (ref 94–109)
CO2 SERPL-SCNC: 28 MMOL/L (ref 20–32)
CREAT SERPL-MCNC: 0.96 MG/DL (ref 0.52–1.04)
DEPRECATED CALCIDIOL+CALCIFEROL SERPL-MC: 30 UG/L (ref 20–75)
DIFFERENTIAL METHOD BLD: NORMAL
EOSINOPHIL # BLD AUTO: 0.1 10E9/L (ref 0–0.7)
EOSINOPHIL NFR BLD AUTO: 1.3 %
ERYTHROCYTE [DISTWIDTH] IN BLOOD BY AUTOMATED COUNT: 13 % (ref 10–15)
GFR SERPL CREATININE-BSD FRML MDRD: 60 ML/MIN/1.7M2
GLUCOSE SERPL-MCNC: 59 MG/DL (ref 70–99)
HCT VFR BLD AUTO: 43.2 % (ref 35–47)
HGB BLD-MCNC: 14.9 G/DL (ref 11.7–15.7)
IMM GRANULOCYTES # BLD: 0 10E9/L (ref 0–0.4)
IMM GRANULOCYTES NFR BLD: 0 %
LYMPHOCYTES # BLD AUTO: 1.4 10E9/L (ref 0.8–5.3)
LYMPHOCYTES NFR BLD AUTO: 30.3 %
MCH RBC QN AUTO: 31.4 PG (ref 26.5–33)
MCHC RBC AUTO-ENTMCNC: 34.5 G/DL (ref 31.5–36.5)
MCV RBC AUTO: 91 FL (ref 78–100)
MONOCYTES # BLD AUTO: 0.6 10E9/L (ref 0–1.3)
MONOCYTES NFR BLD AUTO: 13 %
NEUTROPHILS # BLD AUTO: 2.5 10E9/L (ref 1.6–8.3)
NEUTROPHILS NFR BLD AUTO: 54.3 %
NRBC # BLD AUTO: 0 10*3/UL
NRBC BLD AUTO-RTO: 0 /100
PLATELET # BLD AUTO: 261 10E9/L (ref 150–450)
POTASSIUM SERPL-SCNC: 4.2 MMOL/L (ref 3.4–5.3)
PROT SERPL-MCNC: 8 G/DL (ref 6.8–8.8)
RBC # BLD AUTO: 4.74 10E12/L (ref 3.8–5.2)
SODIUM SERPL-SCNC: 137 MMOL/L (ref 133–144)
WBC # BLD AUTO: 4.6 10E9/L (ref 4–11)

## 2018-12-07 PROCEDURE — 85025 COMPLETE CBC W/AUTO DIFF WBC: CPT | Performed by: INTERNAL MEDICINE

## 2018-12-07 PROCEDURE — 82306 VITAMIN D 25 HYDROXY: CPT | Performed by: INTERNAL MEDICINE

## 2018-12-07 PROCEDURE — 36415 COLL VENOUS BLD VENIPUNCTURE: CPT

## 2018-12-07 PROCEDURE — G0463 HOSPITAL OUTPT CLINIC VISIT: HCPCS

## 2018-12-07 PROCEDURE — 99214 OFFICE O/P EST MOD 30 MIN: CPT | Performed by: INTERNAL MEDICINE

## 2018-12-07 PROCEDURE — 80053 COMPREHEN METABOLIC PANEL: CPT | Performed by: INTERNAL MEDICINE

## 2018-12-07 PROCEDURE — 86300 IMMUNOASSAY TUMOR CA 15-3: CPT | Performed by: INTERNAL MEDICINE

## 2018-12-07 RX ORDER — EXEMESTANE 25 MG/1
25 TABLET ORAL DAILY
Qty: 90 TABLET | Refills: 3 | Status: SHIPPED | OUTPATIENT
Start: 2018-12-07 | End: 2020-01-02

## 2018-12-07 RX ORDER — SUMATRIPTAN 50 MG/1
50 TABLET, FILM COATED ORAL
COMMUNITY

## 2018-12-07 ASSESSMENT — PAIN SCALES - GENERAL: PAINLEVEL: NO PAIN (0)

## 2018-12-07 NOTE — MR AVS SNAPSHOT
After Visit Summary   12/7/2018    Kita Smith    MRN: [id number]           Patient Information     Date Of Birth          1960        Visit Information        Provider Department      12/7/2018 9:00 AM Preeti Jasso MD [facility]        Today's Diagnoses     Malignant neoplasm of overlapping sites of left breast in female, estrogen receptor positive (H)        Osteopenia of multiple sites          Care Instructions    -please repeat blood pressure check  -schedule DEXA scan in March 2019  -return to clinic in 6 months with labs          Follow-ups after your visit        Your next 10 appointments already scheduled     Mar 19, 2019  8:30 AM CDT   DX HIP/PELVIS/SPINE with SHMADX1   [facility] ([facility])    [address] [place] [id number] [phone]           How do I prepare for my exam? (Food and drink instructions) No Food and Drink Restrictions.  How do I prepare for my exam? (Other instructions) Please do not take any of the following 24 hours prior to the day of your exam: vitamins, calcium tablets, antacids.  What should I wear: If possible, please wear clothes without metal (snaps, zippers). A sweat suit works well.  How long does the exam take: The exam takes about 20 minutes.  What should I bring: Bring a list of your current medicines to your exam (including vitamins, minerals and over-the-counter drugs).  Do I need a :  No  is needed.  What should I do after the exam: No restrictions, You may resume normal activities.  How do I prepare for my exam? (Food and drink instructions) A DEXA scan is a bone-density scan. It uses a low level of radiation to check the strength of your bones. As you lie on a padded table, a machine will take X-rays. We most often scan the hips and lower spine.  Who should I call with questions: If you have any questions, please call the Imaging Department  where you will have your exam. Directions, parking instructions, and other information is available on our website, [email or web address].            Jun 07, 2019  8:00 AM CDT   Return Visit with  Oncology Nurse   [facility]    [facility] [place]  [address]  [place] [address]   [phone]            Jun 07, 2019  9:00 AM CDT   Return Visit with Preeti Jasso MD   [facility]    [facility] [place]  [address]  [place] [address]   [phone]              Future tests that were ordered for you today     Open Future Orders        Priority Expected Expires Ordered    DX Hip/Pelvis/Spine Routine 3/7/2019 12/7/2019 12/7/2018    Ca27.29  breast tumor marker Routine 6/7/2019 12/7/2019 12/7/2018    Vitamin D Deficiency Routine 6/7/2019 12/7/2019 12/7/2018    CBC with platelets differential Routine 6/7/2019 12/7/2019 12/7/2018    Comprehensive metabolic panel Routine 6/7/2019 12/7/2019 12/7/2018            Who to contact     If you have questions or need follow up information about today's clinic visit or your schedule please contact [facility] directly at [phone].  Normal or non-critical lab and imaging results will be communicated to you by Madvenuehart, letter or phone within 4 business days after the clinic has received the results. If you do not hear from us within 7 days, please contact the clinic through Madvenuehart or phone. If you have a critical or abnormal lab result, we will notify you by phone as soon as possible.  Submit refill requests through Yikuaiqu or call your pharmacy and they will forward the refill request to us. Please allow 3 business days for your refill to be completed.          Additional Information About Your Visit        Madvenuehart Information     Yikuaiqu lets you send messages to your doctor, view your test results, renew your  "prescriptions, schedule appointments and more. To sign up, go to [email or web address] . Click on \"Log in\" on the left side of the screen, which will take you to the Welcome page. Then click on \"Sign up Now\" on the right side of the page.     You will be asked to enter the access code listed below, as well as some personal information. Please follow the directions to create your username and password.     Your access code is: [email or web address]  Expires: 3/7/2019  8:22 AM     Your access code will  in 90 days. If you need help or a new code, please call your [place] clinic or [phone].        Care EveryWhere ID     This is your Care EveryWhere ID. This could be used by other organizations to access your [place] medical records  [id number]        Your Vitals Were     Pulse Temperature Respirations Last Period Pulse Oximetry BMI (Body Mass Index)    84 98.1  F (36.7  C) (Oral) 16 2006 99% 23.57 kg/m2       Blood Pressure from Last 3 Encounters:   18 (!) 153/91   18 (!) 136/93   18 129/82    Weight from Last 3 Encounters:   18 66.2 kg (146 lb)   18 63.2 kg (139 lb 6.4 oz)   18 59.8 kg (131 lb 12.8 oz)                 Where to get your medicines      These medications were sent to [facility] [address] [place] [id number]     Phone:  [phone]     exemestane 25 MG tablet          Primary Care Provider Office Phone # Fax #    Checo Crockett [phone]       [facility] [place] [facility] [address] [place] [address]        Equal Access to Services     STEPHANIE VILLEGAS AH: Blake Davis, nereyda dorantes, shayne au ah. Formerly [facility] [phone].    ATENCIÓN: Si habla español, tiene a jasso disposición servicios gratuitos de asistencia lingüística. Llame al [phone].    We comply with " applicable federal civil rights laws and [place] laws. We do not discriminate on the basis of race, color, national origin, age, disability, sex, sexual orientation, or gender identity.            Thank you!     Thank you for choosing [facility]  for your care. Our goal is always to provide you with excellent care. Hearing back from our patients is one way we can continue to improve our services. Please take a few minutes to complete the written survey that you may receive in the mail after your visit with us. Thank you!             Your Updated Medication List - Protect others around you: Learn how to safely use, store and throw away your medicines at www.disposemymeds.org.          This list is accurate as of 12/7/18  9:16 AM.  Always use your most recent med list.                   Brand Name Dispense Instructions for use Diagnosis    busPIRone 15 MG tablet    BUSPAR     Take 15 mg by mouth 2 times daily        CALCIUM + D3 PO           calcium carbonate 500 MG chewable tablet    TUMS     Take 1 chew tab by mouth daily Reported on 4/21/2017        CYMBALTA 30 MG capsule   Generic drug:  DULoxetine      Take 30 mg by mouth 2 times daily        exemestane 25 MG tablet    AROMASIN    90 tablet    Take 1 tablet (25 mg) by mouth daily    Malignant neoplasm of overlapping sites of left breast in female, estrogen receptor positive (H)       melatonin 1 MG Tabs tablet      Take 1 mg by mouth nightly as needed for sleep        SUMAtriptan 50 MG tablet    IMITREX     Take 50 mg by mouth at onset of headache for migraine    Malignant neoplasm of overlapping sites of left breast in female, estrogen receptor positive (H)       traMADol 50 MG tablet    ULTRAM    60 tablet    Take 1 tablet (50 mg) by mouth every 6 hours as needed for pain (maximum 8 tablets per day)    Malignant neoplasm of overlapping sites of left breast in female, estrogen receptor positive (H)       vitamin tablet    B COMPLEX-C

## 2018-12-07 NOTE — PROGRESS NOTES
Medical Assistant Note:  Kita Smith presents today for lab draw.    Patient seen by provider today: Yes / Jasso.   present during visit today: Not Applicable.    Concerns: No Concerns.    Procedure:  Lab draw site: RAC, Needle type: BF, Gauge: 23g with gauze and coban.    Post Assessment:  Labs drawn without difficulty: Yes.    Discharge Plan:  Departure Mode: Ambulatory.    Face to Face Time: 4mins.    Virgie Enriquez CMA

## 2018-12-07 NOTE — MR AVS SNAPSHOT
"              After Visit Summary   12/7/2018    Kita Smith    MRN: [id number]           Patient Information     Date Of Birth          1960        Visit Information        Provider Department      12/7/2018 8:00 AM Nurse, [facility]        Today's Diagnoses     Malignant neoplasm of overlapping sites of left breast in female, estrogen receptor positive (H)           Follow-ups after your visit        Your next 10 appointments already scheduled     Dec 07, 2018  9:00 AM CST   Return Visit with Preeti Jasso MD   [facility] ([facility])    [facility]  [address]  [facility] [id number] [phone]              Who to contact     If you have questions or need follow up information about today's clinic visit or your schedule please contact [facility] directly at [phone].  Normal or non-critical lab and imaging results will be communicated to you by VMwarehart, letter or phone within 4 business days after the clinic has received the results. If you do not hear from us within 7 days, please contact the clinic through VMwarehart or phone. If you have a critical or abnormal lab result, we will notify you by phone as soon as possible.  Submit refill requests through Unbounce or call your pharmacy and they will forward the refill request to us. Please allow 3 business days for your refill to be completed.          Additional Information About Your Visit        MyChart Information     Unbounce lets you send messages to your doctor, view your test results, renew your prescriptions, schedule appointments and more. To sign up, go to [email or web address] . Click on \"Log in\" on the left side of the screen, which will take you to the Welcome page. Then click on \"Sign up Now\" on the right side of the page.     You will be asked to enter the access code listed below, as well as some personal information. " Please follow the directions to create your username and password.     Your access code is: [email or web address]  Expires: 3/7/2019  8:22 AM     Your access code will  in 90 days. If you need help or a new code, please call your [place] clinic or [phone].        Care EveryWhere ID     This is your Care EveryWhere ID. This could be used by other organizations to access your [place] medical records  [id number]        Your Vitals Were     Last Period                   2006            Blood Pressure from Last 3 Encounters:   18 (!) 136/93   18 129/82   17 121/88    Weight from Last 3 Encounters:   18 63.2 kg (139 lb 6.4 oz)   18 59.8 kg (131 lb 12.8 oz)   17 59 kg (130 lb)              We Performed the Following     Ca27.29  breast tumor marker     CBC with platelets differential     Comprehensive metabolic panel     Vitamin D Deficiency        Primary Care Provider Office Phone # Fax #    Checo Crockett [phone]       [facility] [address] [place] [address]        Equal Access to Services     STEPHANIE VILLEGAS : Hadii lesia galindoo Sogui, waaxda luqadaha, qaybta kaalmada adeegyada, shayne narayanan. So [facility] [phone].    ATENCIÓN: Si habla español, tiene a jasso disposición servicios gratuitos de asistencia lingüística. Llame al [phone].    We comply with applicable federal civil rights laws and [place] laws. We do not discriminate on the basis of race, color, national origin, age, disability, sex, sexual orientation, or gender identity.            Thank you!     Thank you for choosing [facility]  for your care. Our goal is always to provide you with excellent care. Hearing back from our patients is one way we can continue to improve our services. Please take a few minutes to complete the written survey that you may receive in the mail after your visit with us. Thank you!              Your Updated Medication List - Protect others around you: Learn how to safely use, store and throw away your medicines at www.disposemymeds.org.          This list is accurate as of 12/7/18  8:22 AM.  Always use your most recent med list.                   Brand Name Dispense Instructions for use Diagnosis    busPIRone 15 MG tablet    BUSPAR     Take 15 mg by mouth 2 times daily        CALCIUM + D3 PO           calcium carbonate 500 MG chewable tablet    TUMS     Take 1 chew tab by mouth daily Reported on 4/21/2017        CYMBALTA 30 MG capsule   Generic drug:  DULoxetine      Take 30 mg by mouth 2 times daily        exemestane 25 MG tablet    AROMASIN    90 tablet    Take 1 tablet (25 mg) by mouth daily    Malignant neoplasm of overlapping sites of left breast in female, estrogen receptor positive (H)       melatonin 1 MG Tabs tablet      Take 1 mg by mouth nightly as needed for sleep        traMADol 50 MG tablet    ULTRAM    60 tablet    Take 1 tablet (50 mg) by mouth every 6 hours as needed for pain (maximum 8 tablets per day)    Malignant neoplasm of overlapping sites of left breast in female, estrogen receptor positive (H)       vitamin tablet    B COMPLEX-C

## 2018-12-07 NOTE — LETTER
12/7/2018         RE: Kita Smith  [address] [place] [address]        Dear Colleague,    Thank you for referring your patient, Kita Smith, to the [facility]. Please see a copy of my visit note below.    Medical Assistant Note:  Kita Smith presents today for lab draw.    Patient seen by provider today: Yes / Jasso.   present during visit today: Not Applicable.    Concerns: No Concerns.    Procedure:  Lab draw site: RAC, Needle type: BF, Gauge: 23g with gauze and coban.    Post Assessment:  Labs drawn without difficulty: Yes.    Discharge Plan:  Departure Mode: Ambulatory.    Face to Face Time: 4mins.    Virgie Enriquez CMA                  Again, thank you for allowing me to participate in the care of your patient.        Sincerely,        Oncology Nurse
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
## 2018-12-07 NOTE — PATIENT INSTRUCTIONS
-please repeat blood pressure check  -schedule DEXA scan in March 2019   Scheduled/eloisa   -return to clinic in 6 months with labs   Scheduled/eloisa         AVS printed & given to patient/eloisa

## 2018-12-07 NOTE — PROGRESS NOTES
"Oncology Rooming Note    December 7, 2018 8:42 AM   Kita Smith is a 58 year old female who presents for:    Chief Complaint   Patient presents with     Oncology Clinic Visit     Initial Vitals: BP (!) 155/100 (BP Location: Right arm, Patient Position: Sitting, Cuff Size: Adult Regular)  Pulse 84  Temp 98.1  F (36.7  C) (Oral)  Resp 16  Wt 66.2 kg (146 lb)  LMP 12/01/2006  SpO2 99%  BMI 23.57 kg/m2 Estimated body mass index is 23.57 kg/(m^2) as calculated from the following:    Height as of 6/21/17: 1.676 m (5' 6\").    Weight as of this encounter: 66.2 kg (146 lb). Body surface area is 1.76 meters squared.  No Pain (0) Comment: Data Unavailable   Patient's last menstrual period was 12/01/2006.  Allergies reviewed: Yes  Medications reviewed: Yes    Medications: Medication refills not needed today.  Pharmacy name entered into [employer]: [facility] [place] [id number] AT [address] [facility] & [facility] [facility] [address]    Clinical concerns: none Jasso was notified.    10 minutes for nursing intake (face to face time)     Mana Gonzales MA            "

## 2018-12-10 ENCOUNTER — TELEPHONE (OUTPATIENT)
Dept: ONCOLOGY | Facility: CLINIC | Age: 58
End: 2018-12-10

## 2018-12-10 NOTE — TELEPHONE ENCOUNTER
Oncology Distress Screening Follow-up  Clinical Social Work  [facility]    Identified Concern and Score From Distress Screenin. How concerned are you about your ability to eat?   0        2. How concerned are you about unintended weight loss or your current weight?   0        3. How concerned are you about feeling depressed or very sad?   6 Abnormal         4. How concerned are you about feeling anxious or very scared?   5  lots of tests lately, two kids moved back home        5. Do you struggle with the loss of meaning and guilherme in your life?       Not at all        6. How concerned are you about work and home life issues that may be affected by your cancer?   3        7. How concerned are you about knowing what resources are available to help you?   0        8. Do you currently have what you would describe as Confucianism or spiritual struggles?              Not at all          Date of Distress Screenin18    Intervention:   Kita is a 58-year-old woman who has a history of breast cancer and melanoma. Kita's breast cancer was initially diagnosed in , s/p bilateral mastectomy and chemotherapy and hormone therapy. She had recurrence in , and she currently takes exemestane daily. Kita came to clinic for follow-up with Dr. Jasso 18, this clinician contacted pt today to follow-up regarding positive distress screen. Message left for Kita with this clinician's contact information, requesting a return call at earliest convenience.     Follow-up Required:   This clinician will attempt to reach pt again in next 2 business days unless pt returns this clinician's call prior.     Please page in the case of psychosocial distress or concern.     AZAR Carpio, [facility]  Phone: [phone]  Pager: [phone]    [facility]: M, T  *every other Thursday, 8am-4:30pm  [facility]: W, F, *every other Thursday, 8am-4:30pm

## 2018-12-11 ENCOUNTER — TELEPHONE (OUTPATIENT)
Dept: ONCOLOGY | Facility: CLINIC | Age: 58
End: 2018-12-11

## 2018-12-11 NOTE — TELEPHONE ENCOUNTER
This clinician called Kita to follow-up regarding positive distress screen. Kita acknowledged that she would be interested in meeting with this clinician in-person to discuss support for mood. This clinician scheduled appointment with Kita 12/14/18 at 3:30pm at [facility].     Please page in the case of psychosocial distress or need.     AZAR Carpio, LICSW  Phone: [phone]  Pager: [phone]    [facility]: M, T  *every other Thursday, 8am-4:30pm  [facility]: W, F, *every other Thursday, 8am-4:30pm

## 2019-03-19 ENCOUNTER — HOSPITAL ENCOUNTER (OUTPATIENT)
Dept: BONE DENSITY | Facility: CLINIC | Age: 59
Discharge: HOME | End: 2019-03-19
Attending: INTERNAL MEDICINE | Admitting: INTERNAL MEDICINE
Payer: COMMERCIAL

## 2019-03-19 DIAGNOSIS — M85.89 OSTEOPENIA OF MULTIPLE SITES: ICD-10-CM

## 2019-03-19 PROCEDURE — 77080 DXA BONE DENSITY AXIAL: CPT

## 2019-03-21 DIAGNOSIS — M85.80 OSTEOPENIA: Primary | ICD-10-CM

## 2019-03-21 DIAGNOSIS — M85.89 OSTEOPENIA OF MULTIPLE SITES: Primary | ICD-10-CM

## 2019-03-21 RX ORDER — ALENDRONATE SODIUM 35 MG/1
TABLET ORAL
Qty: 12 TABLET | Refills: 1 | Status: SHIPPED | OUTPATIENT
Start: 2019-03-21 | End: 2019-03-21

## 2019-03-21 RX ORDER — ALENDRONATE SODIUM 70 MG/1
TABLET ORAL
Qty: 12 TABLET | Refills: 3 | Status: SHIPPED | OUTPATIENT
Start: 2019-03-21 | End: 2019-03-22

## 2019-03-22 DIAGNOSIS — M85.89 OSTEOPENIA OF MULTIPLE SITES: ICD-10-CM

## 2019-03-22 RX ORDER — ALENDRONATE SODIUM 70 MG/1
TABLET ORAL
Qty: 12 TABLET | Refills: 3 | Status: SHIPPED | OUTPATIENT
Start: 2019-03-22 | End: 2020-02-13

## 2019-06-07 ENCOUNTER — HOSPITAL ENCOUNTER (OUTPATIENT)
Facility: CLINIC | Age: 59
Setting detail: SPECIMEN
Discharge: HOME | End: 2019-06-07
Attending: INTERNAL MEDICINE | Admitting: INTERNAL MEDICINE
Payer: COMMERCIAL

## 2019-06-07 ENCOUNTER — INFUSION THERAPY VISIT (OUTPATIENT)
Dept: INFUSION THERAPY | Facility: CLINIC | Age: 59
End: 2019-06-07
Attending: INTERNAL MEDICINE
Payer: COMMERCIAL

## 2019-06-07 ENCOUNTER — ONCOLOGY VISIT (OUTPATIENT)
Dept: ONCOLOGY | Facility: CLINIC | Age: 59
End: 2019-06-07
Attending: INTERNAL MEDICINE
Payer: COMMERCIAL

## 2019-06-07 VITALS
DIASTOLIC BLOOD PRESSURE: 95 MMHG | SYSTOLIC BLOOD PRESSURE: 139 MMHG | HEART RATE: 80 BPM | OXYGEN SATURATION: 97 % | TEMPERATURE: 98.4 F | HEIGHT: 66 IN | BODY MASS INDEX: 23.51 KG/M2 | WEIGHT: 146.3 LBS

## 2019-06-07 DIAGNOSIS — C50.812 MALIGNANT NEOPLASM OF OVERLAPPING SITES OF LEFT BREAST IN FEMALE, ESTROGEN RECEPTOR POSITIVE (H): ICD-10-CM

## 2019-06-07 DIAGNOSIS — E55.9 VITAMIN D DEFICIENCY: ICD-10-CM

## 2019-06-07 DIAGNOSIS — Z17.0 MALIGNANT NEOPLASM OF OVERLAPPING SITES OF LEFT BREAST IN FEMALE, ESTROGEN RECEPTOR POSITIVE (H): ICD-10-CM

## 2019-06-07 DIAGNOSIS — R22.41 THIGH LUMP, RIGHT: Primary | ICD-10-CM

## 2019-06-07 LAB
ALBUMIN SERPL-MCNC: 3.6 G/DL (ref 3.4–5)
ALP SERPL-CCNC: 73 U/L (ref 40–150)
ALT SERPL W P-5'-P-CCNC: 20 U/L (ref 0–50)
ANION GAP SERPL CALCULATED.3IONS-SCNC: 6 MMOL/L (ref 3–14)
AST SERPL W P-5'-P-CCNC: 12 U/L (ref 0–45)
BASOPHILS # BLD AUTO: 0.1 10E9/L (ref 0–0.2)
BASOPHILS NFR BLD AUTO: 1.3 %
BILIRUB SERPL-MCNC: 0.5 MG/DL (ref 0.2–1.3)
BUN SERPL-MCNC: 10 MG/DL (ref 7–30)
CALCIUM SERPL-MCNC: 9.1 MG/DL (ref 8.5–10.1)
CANCER AG27-29 SERPL-ACNC: 25 U/ML (ref 0–39)
CHLORIDE SERPL-SCNC: 105 MMOL/L (ref 94–109)
CO2 SERPL-SCNC: 28 MMOL/L (ref 20–32)
CREAT SERPL-MCNC: 0.89 MG/DL (ref 0.52–1.04)
DEPRECATED CALCIDIOL+CALCIFEROL SERPL-MC: 34 UG/L (ref 20–75)
DIFFERENTIAL METHOD BLD: NORMAL
EOSINOPHIL # BLD AUTO: 0.1 10E9/L (ref 0–0.7)
EOSINOPHIL NFR BLD AUTO: 1.8 %
ERYTHROCYTE [DISTWIDTH] IN BLOOD BY AUTOMATED COUNT: 13 % (ref 10–15)
GFR SERPL CREATININE-BSD FRML MDRD: 70 ML/MIN/{1.73_M2}
GLUCOSE SERPL-MCNC: 94 MG/DL (ref 70–99)
HCT VFR BLD AUTO: 41.8 % (ref 35–47)
HGB BLD-MCNC: 14.6 G/DL (ref 11.7–15.7)
IMM GRANULOCYTES # BLD: 0 10E9/L (ref 0–0.4)
IMM GRANULOCYTES NFR BLD: 0 %
LYMPHOCYTES # BLD AUTO: 1.4 10E9/L (ref 0.8–5.3)
LYMPHOCYTES NFR BLD AUTO: 36.3 %
MCH RBC QN AUTO: 31.4 PG (ref 26.5–33)
MCHC RBC AUTO-ENTMCNC: 34.9 G/DL (ref 31.5–36.5)
MCV RBC AUTO: 90 FL (ref 78–100)
MONOCYTES # BLD AUTO: 0.4 10E9/L (ref 0–1.3)
MONOCYTES NFR BLD AUTO: 10.1 %
NEUTROPHILS # BLD AUTO: 2 10E9/L (ref 1.6–8.3)
NEUTROPHILS NFR BLD AUTO: 50.5 %
NRBC # BLD AUTO: 0 10*3/UL
NRBC BLD AUTO-RTO: 0 /100
PLATELET # BLD AUTO: 239 10E9/L (ref 150–450)
POTASSIUM SERPL-SCNC: 4 MMOL/L (ref 3.4–5.3)
PROT SERPL-MCNC: 7.1 G/DL (ref 6.8–8.8)
RBC # BLD AUTO: 4.65 10E12/L (ref 3.8–5.2)
SODIUM SERPL-SCNC: 139 MMOL/L (ref 133–144)
WBC # BLD AUTO: 4 10E9/L (ref 4–11)

## 2019-06-07 PROCEDURE — 82306 VITAMIN D 25 HYDROXY: CPT | Performed by: INTERNAL MEDICINE

## 2019-06-07 PROCEDURE — 99214 OFFICE O/P EST MOD 30 MIN: CPT | Performed by: INTERNAL MEDICINE

## 2019-06-07 PROCEDURE — 86300 IMMUNOASSAY TUMOR CA 15-3: CPT | Performed by: INTERNAL MEDICINE

## 2019-06-07 PROCEDURE — 80053 COMPREHEN METABOLIC PANEL: CPT | Performed by: INTERNAL MEDICINE

## 2019-06-07 PROCEDURE — 85025 COMPLETE CBC W/AUTO DIFF WBC: CPT | Performed by: INTERNAL MEDICINE

## 2019-06-07 PROCEDURE — G0463 HOSPITAL OUTPT CLINIC VISIT: HCPCS

## 2019-06-07 ASSESSMENT — PAIN SCALES - GENERAL: PAINLEVEL: NO PAIN (0)

## 2019-06-07 ASSESSMENT — MIFFLIN-ST. JEOR: SCORE: 1255.36
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
## 2019-06-11 ENCOUNTER — HOSPITAL ENCOUNTER (OUTPATIENT)
Dept: ULTRASOUND IMAGING | Facility: CLINIC | Age: 59
Discharge: HOME | End: 2019-06-11
Attending: INTERNAL MEDICINE | Admitting: INTERNAL MEDICINE
Payer: COMMERCIAL

## 2019-06-11 DIAGNOSIS — R22.41 THIGH LUMP, RIGHT: ICD-10-CM

## 2019-06-11 PROCEDURE — 76882 US LMTD JT/FCL EVL NVASC XTR: CPT | Mod: RT

## 2020-01-02 ENCOUNTER — TELEPHONE (OUTPATIENT)
Dept: ONCOLOGY | Facility: CLINIC | Age: 60
End: 2020-01-02

## 2020-01-02 DIAGNOSIS — Z17.0 MALIGNANT NEOPLASM OF OVERLAPPING SITES OF LEFT BREAST IN FEMALE, ESTROGEN RECEPTOR POSITIVE (H): ICD-10-CM

## 2020-01-02 DIAGNOSIS — C50.812 MALIGNANT NEOPLASM OF OVERLAPPING SITES OF LEFT BREAST IN FEMALE, ESTROGEN RECEPTOR POSITIVE (H): ICD-10-CM

## 2020-01-02 RX ORDER — EXEMESTANE 25 MG/1
25 TABLET ORAL DAILY
Qty: 90 TABLET | Refills: 3 | Status: SHIPPED | OUTPATIENT
Start: 2020-01-02 | End: 2020-12-17

## 2020-02-05 ENCOUNTER — HOSPITAL ENCOUNTER (OUTPATIENT)
Facility: CLINIC | Age: 60
Setting detail: SPECIMEN
Discharge: HOME | End: 2020-02-05
Attending: INTERNAL MEDICINE | Admitting: INTERNAL MEDICINE
Payer: COMMERCIAL

## 2020-02-05 ENCOUNTER — INFUSION THERAPY VISIT (OUTPATIENT)
Dept: INFUSION THERAPY | Facility: CLINIC | Age: 60
End: 2020-02-05
Attending: INTERNAL MEDICINE
Payer: COMMERCIAL

## 2020-02-05 DIAGNOSIS — C50.812 MALIGNANT NEOPLASM OF OVERLAPPING SITES OF LEFT BREAST IN FEMALE, ESTROGEN RECEPTOR POSITIVE (H): ICD-10-CM

## 2020-02-05 DIAGNOSIS — Z17.0 MALIGNANT NEOPLASM OF OVERLAPPING SITES OF LEFT BREAST IN FEMALE, ESTROGEN RECEPTOR POSITIVE (H): ICD-10-CM

## 2020-02-05 DIAGNOSIS — Z17.0 MALIGNANT NEOPLASM OF OVERLAPPING SITES OF LEFT BREAST IN FEMALE, ESTROGEN RECEPTOR POSITIVE (H): Primary | ICD-10-CM

## 2020-02-05 DIAGNOSIS — E55.9 VITAMIN D DEFICIENCY: ICD-10-CM

## 2020-02-05 DIAGNOSIS — C50.812 MALIGNANT NEOPLASM OF OVERLAPPING SITES OF LEFT BREAST IN FEMALE, ESTROGEN RECEPTOR POSITIVE (H): Primary | ICD-10-CM

## 2020-02-05 LAB
ALBUMIN SERPL-MCNC: 3.7 G/DL (ref 3.4–5)
ALP SERPL-CCNC: 72 U/L (ref 40–150)
ALT SERPL W P-5'-P-CCNC: 21 U/L (ref 0–50)
ANION GAP SERPL CALCULATED.3IONS-SCNC: 2 MMOL/L (ref 3–14)
AST SERPL W P-5'-P-CCNC: 15 U/L (ref 0–45)
BASOPHILS # BLD AUTO: 0.1 10E9/L (ref 0–0.2)
BASOPHILS NFR BLD AUTO: 1.3 %
BILIRUB SERPL-MCNC: 0.5 MG/DL (ref 0.2–1.3)
BUN SERPL-MCNC: 15 MG/DL (ref 7–30)
CALCIUM SERPL-MCNC: 9.3 MG/DL (ref 8.5–10.1)
CANCER AG27-29 SERPL-ACNC: 44 U/ML (ref 0–39)
CHLORIDE SERPL-SCNC: 108 MMOL/L (ref 94–109)
CO2 SERPL-SCNC: 29 MMOL/L (ref 20–32)
CREAT SERPL-MCNC: 0.86 MG/DL (ref 0.52–1.04)
DEPRECATED CALCIDIOL+CALCIFEROL SERPL-MC: 39 UG/L (ref 20–75)
DIFFERENTIAL METHOD BLD: NORMAL
EOSINOPHIL # BLD AUTO: 0.1 10E9/L (ref 0–0.7)
EOSINOPHIL NFR BLD AUTO: 1.5 %
ERYTHROCYTE [DISTWIDTH] IN BLOOD BY AUTOMATED COUNT: 13.1 % (ref 10–15)
GFR SERPL CREATININE-BSD FRML MDRD: 73 ML/MIN/{1.73_M2}
GLUCOSE SERPL-MCNC: 56 MG/DL (ref 70–99)
HCT VFR BLD AUTO: 43.1 % (ref 35–47)
HGB BLD-MCNC: 14.6 G/DL (ref 11.7–15.7)
IMM GRANULOCYTES # BLD: 0 10E9/L (ref 0–0.4)
IMM GRANULOCYTES NFR BLD: 0 %
LYMPHOCYTES # BLD AUTO: 1.7 10E9/L (ref 0.8–5.3)
LYMPHOCYTES NFR BLD AUTO: 32.2 %
MCH RBC QN AUTO: 30.5 PG (ref 26.5–33)
MCHC RBC AUTO-ENTMCNC: 33.9 G/DL (ref 31.5–36.5)
MCV RBC AUTO: 90 FL (ref 78–100)
MONOCYTES # BLD AUTO: 0.8 10E9/L (ref 0–1.3)
MONOCYTES NFR BLD AUTO: 15.1 %
NEUTROPHILS # BLD AUTO: 2.7 10E9/L (ref 1.6–8.3)
NEUTROPHILS NFR BLD AUTO: 49.9 %
NRBC # BLD AUTO: 0 10*3/UL
NRBC BLD AUTO-RTO: 0 /100
PLATELET # BLD AUTO: 274 10E9/L (ref 150–450)
POTASSIUM SERPL-SCNC: 4.1 MMOL/L (ref 3.4–5.3)
PROT SERPL-MCNC: 7.2 G/DL (ref 6.8–8.8)
RBC # BLD AUTO: 4.79 10E12/L (ref 3.8–5.2)
SODIUM SERPL-SCNC: 139 MMOL/L (ref 133–144)
WBC # BLD AUTO: 5.3 10E9/L (ref 4–11)

## 2020-02-05 PROCEDURE — 82306 VITAMIN D 25 HYDROXY: CPT | Performed by: INTERNAL MEDICINE

## 2020-02-05 PROCEDURE — 36415 COLL VENOUS BLD VENIPUNCTURE: CPT

## 2020-02-05 PROCEDURE — 80053 COMPREHEN METABOLIC PANEL: CPT | Performed by: INTERNAL MEDICINE

## 2020-02-05 PROCEDURE — 86300 IMMUNOASSAY TUMOR CA 15-3: CPT | Performed by: INTERNAL MEDICINE

## 2020-02-05 PROCEDURE — 85025 COMPLETE CBC W/AUTO DIFF WBC: CPT | Performed by: INTERNAL MEDICINE

## 2020-02-05 NOTE — PROGRESS NOTES
Medical Assistant Note:  Kita Smith presents today for blood draw.    Patient seen by provider today: No.   present during visit today: Not Applicable.    Concerns: No Concerns.    Procedure:  Lab draw site: RAC, Needle type: BF, Gauge: 23.    Post Assessment:  Labs drawn without difficulty: Yes.    Discharge Plan:  Departure Mode: Ambulatory.    Face to Face Time: 5 MIN  .    Kristi Padilla, CMA

## 2020-02-13 DIAGNOSIS — M85.89 OSTEOPENIA OF MULTIPLE SITES: ICD-10-CM

## 2020-02-13 RX ORDER — ALENDRONATE SODIUM 70 MG/1
TABLET ORAL
Qty: 12 TABLET | Refills: 3 | Status: SHIPPED | OUTPATIENT
Start: 2020-02-13 | End: 2020-12-17

## 2020-02-19 NOTE — PROGRESS NOTES
BMI is normal.  JAMARI Link MA February 28, 2017 3:25 PM       Non-Graft Cartilage Fenestration Text: The cartilage was fenestrated with a 2mm punch biopsy to help facilitate healing.

## 2020-02-23 ENCOUNTER — HEALTH MAINTENANCE LETTER (OUTPATIENT)
Age: 60
End: 2020-02-23

## 2020-03-03 ENCOUNTER — PATIENT OUTREACH (OUTPATIENT)
Dept: ONCOLOGY | Facility: CLINIC | Age: 60
End: 2020-03-03

## 2020-03-03 ENCOUNTER — HOSPITAL ENCOUNTER (OUTPATIENT)
Facility: CLINIC | Age: 60
Setting detail: SPECIMEN
Discharge: HOME | End: 2020-03-03
Attending: INTERNAL MEDICINE | Admitting: INTERNAL MEDICINE
Payer: COMMERCIAL

## 2020-03-03 ENCOUNTER — INFUSION THERAPY VISIT (OUTPATIENT)
Dept: INFUSION THERAPY | Facility: CLINIC | Age: 60
End: 2020-03-03
Attending: INTERNAL MEDICINE
Payer: COMMERCIAL

## 2020-03-03 DIAGNOSIS — C50.812 MALIGNANT NEOPLASM OF OVERLAPPING SITES OF LEFT BREAST IN FEMALE, ESTROGEN RECEPTOR POSITIVE (H): ICD-10-CM

## 2020-03-03 DIAGNOSIS — Z17.0 MALIGNANT NEOPLASM OF OVERLAPPING SITES OF LEFT BREAST IN FEMALE, ESTROGEN RECEPTOR POSITIVE (H): Primary | ICD-10-CM

## 2020-03-03 DIAGNOSIS — Z17.0 MALIGNANT NEOPLASM OF OVERLAPPING SITES OF LEFT BREAST IN FEMALE, ESTROGEN RECEPTOR POSITIVE (H): ICD-10-CM

## 2020-03-03 DIAGNOSIS — C50.812 MALIGNANT NEOPLASM OF OVERLAPPING SITES OF LEFT BREAST IN FEMALE, ESTROGEN RECEPTOR POSITIVE (H): Primary | ICD-10-CM

## 2020-03-03 LAB
ALBUMIN SERPL-MCNC: 3.4 G/DL (ref 3.4–5)
ALP SERPL-CCNC: 68 U/L (ref 40–150)
ALT SERPL W P-5'-P-CCNC: 21 U/L (ref 0–50)
ANION GAP SERPL CALCULATED.3IONS-SCNC: 6 MMOL/L (ref 3–14)
AST SERPL W P-5'-P-CCNC: 19 U/L (ref 0–45)
BASOPHILS # BLD AUTO: 0.1 10E9/L (ref 0–0.2)
BASOPHILS NFR BLD AUTO: 1 %
BILIRUB SERPL-MCNC: 0.5 MG/DL (ref 0.2–1.3)
BUN SERPL-MCNC: 13 MG/DL (ref 7–30)
CALCIUM SERPL-MCNC: 8.9 MG/DL (ref 8.5–10.1)
CANCER AG27-29 SERPL-ACNC: 51 U/ML (ref 0–39)
CHLORIDE SERPL-SCNC: 108 MMOL/L (ref 94–109)
CO2 SERPL-SCNC: 24 MMOL/L (ref 20–32)
CREAT SERPL-MCNC: 0.86 MG/DL (ref 0.52–1.04)
DIFFERENTIAL METHOD BLD: NORMAL
EOSINOPHIL # BLD AUTO: 0.1 10E9/L (ref 0–0.7)
EOSINOPHIL NFR BLD AUTO: 1.7 %
ERYTHROCYTE [DISTWIDTH] IN BLOOD BY AUTOMATED COUNT: 13.1 % (ref 10–15)
GFR SERPL CREATININE-BSD FRML MDRD: 73 ML/MIN/{1.73_M2}
GLUCOSE SERPL-MCNC: 67 MG/DL (ref 70–99)
HCT VFR BLD AUTO: 41.9 % (ref 35–47)
HGB BLD-MCNC: 14.5 G/DL (ref 11.7–15.7)
IMM GRANULOCYTES # BLD: 0 10E9/L (ref 0–0.4)
IMM GRANULOCYTES NFR BLD: 0.2 %
LYMPHOCYTES # BLD AUTO: 1.9 10E9/L (ref 0.8–5.3)
LYMPHOCYTES NFR BLD AUTO: 32.6 %
MCH RBC QN AUTO: 30.8 PG (ref 26.5–33)
MCHC RBC AUTO-ENTMCNC: 34.6 G/DL (ref 31.5–36.5)
MCV RBC AUTO: 89 FL (ref 78–100)
MONOCYTES # BLD AUTO: 0.7 10E9/L (ref 0–1.3)
MONOCYTES NFR BLD AUTO: 12.6 %
NEUTROPHILS # BLD AUTO: 3 10E9/L (ref 1.6–8.3)
NEUTROPHILS NFR BLD AUTO: 51.9 %
NRBC # BLD AUTO: 0 10*3/UL
NRBC BLD AUTO-RTO: 0 /100
PLATELET # BLD AUTO: 272 10E9/L (ref 150–450)
POTASSIUM SERPL-SCNC: 4.2 MMOL/L (ref 3.4–5.3)
PROT SERPL-MCNC: 7.2 G/DL (ref 6.8–8.8)
RBC # BLD AUTO: 4.71 10E12/L (ref 3.8–5.2)
SODIUM SERPL-SCNC: 138 MMOL/L (ref 133–144)
WBC # BLD AUTO: 5.7 10E9/L (ref 4–11)

## 2020-03-03 PROCEDURE — 36415 COLL VENOUS BLD VENIPUNCTURE: CPT

## 2020-03-03 PROCEDURE — 86300 IMMUNOASSAY TUMOR CA 15-3: CPT | Performed by: INTERNAL MEDICINE

## 2020-03-03 PROCEDURE — 80053 COMPREHEN METABOLIC PANEL: CPT | Performed by: INTERNAL MEDICINE

## 2020-03-03 PROCEDURE — 85025 COMPLETE CBC W/AUTO DIFF WBC: CPT | Performed by: INTERNAL MEDICINE

## 2020-03-03 NOTE — PROGRESS NOTES
Medical Assistant Note:  Kita Smith presents today for Blood draw.    Patient seen by provider today: No.   present during visit today: Not Applicable.    Concerns: No Concerns.    Procedure:  Lab draw site: RAC, Needle type: BF, Gauge: 23.    Post Assessment:  Labs drawn without difficulty: Yes.    Discharge Plan:  Departure Mode: Ambulatory.    Face to Face Time: 5min.    Bethany Turner MA

## 2020-03-03 NOTE — PROGRESS NOTES
Called Kita after consulting with Dr. Jasso regarding elevation of CA 27-29 . Dr. Jasso recommends a Pet/CT scan for patient. Order is in and Kita would like to schedule it for this Monday 3/9/20. She was transferred to scheduling to schedule. Message will be forwarded to [facility] for Dr. Jasso. Kary Schultz RN,BSN,OCN

## 2020-03-12 ENCOUNTER — HOSPITAL ENCOUNTER (OUTPATIENT)
Dept: PET IMAGING | Facility: CLINIC | Age: 60
Discharge: HOME | End: 2020-03-12
Attending: INTERNAL MEDICINE | Admitting: INTERNAL MEDICINE
Payer: COMMERCIAL

## 2020-03-12 DIAGNOSIS — Z17.0 MALIGNANT NEOPLASM OF OVERLAPPING SITES OF LEFT BREAST IN FEMALE, ESTROGEN RECEPTOR POSITIVE (H): ICD-10-CM

## 2020-03-12 DIAGNOSIS — C50.812 MALIGNANT NEOPLASM OF OVERLAPPING SITES OF LEFT BREAST IN FEMALE, ESTROGEN RECEPTOR POSITIVE (H): ICD-10-CM

## 2020-03-12 PROCEDURE — 78816 PET IMAGE W/CT FULL BODY: CPT | Mod: PS

## 2020-03-12 PROCEDURE — 25000128 H RX IP 250 OP 636: Performed by: INTERNAL MEDICINE

## 2020-03-12 PROCEDURE — 34300033 ZZH RX 343: Performed by: INTERNAL MEDICINE

## 2020-03-12 PROCEDURE — A9552 F18 FDG: HCPCS | Performed by: INTERNAL MEDICINE

## 2020-03-12 RX ORDER — IOPAMIDOL 755 MG/ML
45-135 INJECTION, SOLUTION INTRAVASCULAR ONCE
Status: COMPLETED | OUTPATIENT
Start: 2020-03-12 | End: 2020-03-12

## 2020-03-12 RX ADMIN — FLUDEOXYGLUCOSE F-18 10.25 MCI.: 500 INJECTION, SOLUTION INTRAVENOUS at 14:00

## 2020-03-12 RX ADMIN — IOPAMIDOL 89 ML: 755 INJECTION, SOLUTION INTRAVENOUS at 15:15

## 2020-03-13 ENCOUNTER — TELEPHONE (OUTPATIENT)
Dept: ONCOLOGY | Facility: CLINIC | Age: 60
End: 2020-03-13

## 2020-03-13 DIAGNOSIS — K62.9 ANAL LESION: Primary | ICD-10-CM

## 2020-03-13 NOTE — PROGRESS NOTES
PET report returned. Encounter routed to provider for review and recommendations.     Stephanie Schuler, BSN, RN, PHN

## 2020-03-13 NOTE — TELEPHONE ENCOUNTER
Kita called clinic inquiring about her Pet scan result. It has not yet been read by radiologist. She was informed that she will be notified as soon as it is read. Kary Schultz RN,BSN,OCN

## 2020-03-17 ENCOUNTER — HOSPITAL ENCOUNTER (OUTPATIENT)
Dept: MRI IMAGING | Facility: CLINIC | Age: 60
Discharge: HOME | End: 2020-03-17
Attending: INTERNAL MEDICINE | Admitting: INTERNAL MEDICINE
Payer: COMMERCIAL

## 2020-03-17 DIAGNOSIS — K62.9 ANAL LESION: ICD-10-CM

## 2020-03-17 PROCEDURE — A9585 GADOBUTROL INJECTION: HCPCS | Performed by: INTERNAL MEDICINE

## 2020-03-17 PROCEDURE — 72197 MRI PELVIS W/O & W/DYE: CPT

## 2020-03-17 PROCEDURE — 25500064 ZZH RX 255 OP 636: Performed by: INTERNAL MEDICINE

## 2020-03-17 RX ORDER — GADOBUTROL 604.72 MG/ML
6 INJECTION INTRAVENOUS ONCE
Status: COMPLETED | OUTPATIENT
Start: 2020-03-17 | End: 2020-03-17

## 2020-03-17 RX ADMIN — GADOBUTROL 6 ML: 604.72 INJECTION INTRAVENOUS at 17:39

## 2020-03-19 ENCOUNTER — MYC MEDICAL ADVICE (OUTPATIENT)
Dept: ONCOLOGY | Facility: CLINIC | Age: 60
End: 2020-03-19

## 2020-03-19 NOTE — TELEPHONE ENCOUNTER
Writer discussed with Dr. Jasso who agrees to telephone visit. Writer confirmed plan with patient and adjusted visit to telephone encounter.     Stephanie Schuler, RONALDN, RN, PHN

## 2020-03-20 NOTE — TELEPHONE ENCOUNTER
"3/20/20: Per Dr. Jasso, \"I called Kita but there was no answer.  I didn't leave a message.  If she calls back, you can let her know what IR said, that the lesions are too small to biopsy.\"      "

## 2020-03-27 ENCOUNTER — VIRTUAL VISIT (OUTPATIENT)
Dept: ONCOLOGY | Facility: CLINIC | Age: 60
End: 2020-03-27
Attending: INTERNAL MEDICINE
Payer: COMMERCIAL

## 2020-03-27 DIAGNOSIS — R91.8 PULMONARY NODULES: ICD-10-CM

## 2020-03-27 DIAGNOSIS — C50.812 MALIGNANT NEOPLASM OF OVERLAPPING SITES OF LEFT BREAST IN FEMALE, ESTROGEN RECEPTOR POSITIVE (H): Primary | ICD-10-CM

## 2020-03-27 DIAGNOSIS — F41.9 ANXIETY: ICD-10-CM

## 2020-03-27 DIAGNOSIS — Z17.0 MALIGNANT NEOPLASM OF OVERLAPPING SITES OF LEFT BREAST IN FEMALE, ESTROGEN RECEPTOR POSITIVE (H): Primary | ICD-10-CM

## 2020-03-27 PROCEDURE — 99213 OFFICE O/P EST LOW 20 MIN: CPT | Mod: TEL | Performed by: INTERNAL MEDICINE

## 2020-03-27 RX ORDER — LORAZEPAM 0.5 MG/1
0.5 TABLET ORAL EVERY 8 HOURS PRN
Qty: 30 TABLET | Refills: 0 | Status: SHIPPED | OUTPATIENT
Start: 2020-03-27 | End: 2020-04-13
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
## 2020-04-13 ENCOUNTER — TELEPHONE (OUTPATIENT)
Dept: ONCOLOGY | Facility: CLINIC | Age: 60
End: 2020-04-13

## 2020-04-13 DIAGNOSIS — F41.9 ANXIETY: ICD-10-CM

## 2020-04-13 RX ORDER — LORAZEPAM 0.5 MG/1
0.5 TABLET ORAL EVERY 8 HOURS PRN
Qty: 30 TABLET | Refills: 2 | Status: SHIPPED | OUTPATIENT
Start: 2020-04-13 | End: 2020-06-26

## 2020-04-13 NOTE — TELEPHONE ENCOUNTER
Kita called requesting a refill of her Ativan.She has requested repeat refills so she does not need to refill every month. Message will be forwarded to Dr. Jasso so that she can e scribe to [facility] in [place]. Forward to Dr. Jasso and RNCC.

## 2020-06-26 ENCOUNTER — TELEPHONE (OUTPATIENT)
Dept: ONCOLOGY | Facility: CLINIC | Age: 60
End: 2020-06-26

## 2020-06-26 DIAGNOSIS — F41.9 ANXIETY: ICD-10-CM

## 2020-06-26 RX ORDER — LORAZEPAM 0.5 MG/1
0.5 TABLET ORAL EVERY 8 HOURS PRN
Qty: 30 TABLET | Refills: 2 | Status: SHIPPED | OUTPATIENT
Start: 2020-06-26 | End: 2020-08-24

## 2020-06-26 NOTE — TELEPHONE ENCOUNTER
[facility] Telephone Triage Note    Assessment: Patient called in to triage.  Refill for lorezapam request was sent from pharmacy.      Follow-Up: Encounter routed to nursing team for review.

## 2020-06-29 ENCOUNTER — HOSPITAL ENCOUNTER (OUTPATIENT)
Dept: CT IMAGING | Facility: CLINIC | Age: 60
End: 2020-06-29
Attending: INTERNAL MEDICINE
Payer: COMMERCIAL

## 2020-06-29 ENCOUNTER — HOSPITAL ENCOUNTER (OUTPATIENT)
Dept: LAB | Facility: CLINIC | Age: 60
End: 2020-06-29
Attending: INTERNAL MEDICINE
Payer: COMMERCIAL

## 2020-06-29 DIAGNOSIS — Z17.0 MALIGNANT NEOPLASM OF OVERLAPPING SITES OF LEFT BREAST IN FEMALE, ESTROGEN RECEPTOR POSITIVE (H): ICD-10-CM

## 2020-06-29 DIAGNOSIS — R91.8 PULMONARY NODULES: ICD-10-CM

## 2020-06-29 DIAGNOSIS — C50.812 MALIGNANT NEOPLASM OF OVERLAPPING SITES OF LEFT BREAST IN FEMALE, ESTROGEN RECEPTOR POSITIVE (H): ICD-10-CM

## 2020-06-29 LAB — CANCER AG27-29 SERPL-ACNC: 36 U/ML (ref 0–39)

## 2020-06-29 PROCEDURE — 71250 CT THORAX DX C-: CPT

## 2020-06-29 PROCEDURE — 36415 COLL VENOUS BLD VENIPUNCTURE: CPT | Performed by: INTERNAL MEDICINE

## 2020-06-29 PROCEDURE — 86300 IMMUNOASSAY TUMOR CA 15-3: CPT | Performed by: INTERNAL MEDICINE

## 2020-07-01 ENCOUNTER — VIRTUAL VISIT (OUTPATIENT)
Dept: ONCOLOGY | Facility: CLINIC | Age: 60
End: 2020-07-01
Attending: INTERNAL MEDICINE
Payer: COMMERCIAL

## 2020-07-01 DIAGNOSIS — Z17.0 MALIGNANT NEOPLASM OF OVERLAPPING SITES OF LEFT BREAST IN FEMALE, ESTROGEN RECEPTOR POSITIVE (H): ICD-10-CM

## 2020-07-01 DIAGNOSIS — C50.812 MALIGNANT NEOPLASM OF OVERLAPPING SITES OF LEFT BREAST IN FEMALE, ESTROGEN RECEPTOR POSITIVE (H): ICD-10-CM

## 2020-07-01 DIAGNOSIS — R91.8 PULMONARY NODULES: Primary | ICD-10-CM

## 2020-07-01 PROCEDURE — G0463 HOSPITAL OUTPT CLINIC VISIT: HCPCS

## 2020-07-01 PROCEDURE — 99212 OFFICE O/P EST SF 10 MIN: CPT | Mod: 95 | Performed by: INTERNAL MEDICINE

## 2020-07-01 PROCEDURE — 40001009 ZZH VIDEO/TELEPHONE VISIT; NO CHARGE: Mod: 95

## 2020-07-01 ASSESSMENT — PAIN SCALES - GENERAL: PAINLEVEL: NO PAIN (0)
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
## 2020-08-24 ENCOUNTER — TELEPHONE (OUTPATIENT)
Dept: ONCOLOGY | Facility: CLINIC | Age: 60
End: 2020-08-24

## 2020-08-24 DIAGNOSIS — F41.9 ANXIETY: ICD-10-CM

## 2020-08-24 RX ORDER — LORAZEPAM 0.5 MG/1
0.5 TABLET ORAL EVERY 8 HOURS PRN
Qty: 90 TABLET | Refills: 0 | Status: SHIPPED | OUTPATIENT
Start: 2020-08-24 | End: 2020-10-29

## 2020-08-24 NOTE — TELEPHONE ENCOUNTER
Called patient to let her know that prescription was sent to the discharge pharmacy at [facility].    RONALD JamesN, RN, OCN  Oncology Care Coordinator  [facility]

## 2020-08-24 NOTE — TELEPHONE ENCOUNTER
[facility]. Explained to Kita that there is a national shortage of Ativan and [facility]. [facility]. Message will be forwarded to Dr. Jasso to change pharmacy to  [facility] [place]  [facility] Ativan in stock. Kita is wondering if she can receive 90 pills instead of 30. Will be routed to Dr. Jasso/CCRN.

## 2020-09-29 ENCOUNTER — HOSPITAL ENCOUNTER (OUTPATIENT)
Dept: CT IMAGING | Facility: CLINIC | Age: 60
End: 2020-09-29
Attending: INTERNAL MEDICINE
Payer: COMMERCIAL

## 2020-09-29 ENCOUNTER — HOSPITAL ENCOUNTER (OUTPATIENT)
Dept: LAB | Facility: CLINIC | Age: 60
End: 2020-09-29
Attending: INTERNAL MEDICINE
Payer: COMMERCIAL

## 2020-09-29 DIAGNOSIS — R91.8 PULMONARY NODULES: ICD-10-CM

## 2020-09-29 DIAGNOSIS — C50.812 MALIGNANT NEOPLASM OF OVERLAPPING SITES OF LEFT BREAST IN FEMALE, ESTROGEN RECEPTOR POSITIVE (H): ICD-10-CM

## 2020-09-29 DIAGNOSIS — Z17.0 MALIGNANT NEOPLASM OF OVERLAPPING SITES OF LEFT BREAST IN FEMALE, ESTROGEN RECEPTOR POSITIVE (H): ICD-10-CM

## 2020-09-29 LAB
ALBUMIN SERPL-MCNC: 3.5 G/DL (ref 3.4–5)
ALP SERPL-CCNC: 58 U/L (ref 40–150)
ALT SERPL W P-5'-P-CCNC: 20 U/L (ref 0–50)
ANION GAP SERPL CALCULATED.3IONS-SCNC: 5 MMOL/L (ref 3–14)
AST SERPL W P-5'-P-CCNC: 15 U/L (ref 0–45)
BASOPHILS # BLD AUTO: 0.1 10E9/L (ref 0–0.2)
BASOPHILS NFR BLD AUTO: 1.6 %
BILIRUB SERPL-MCNC: 0.6 MG/DL (ref 0.2–1.3)
BUN SERPL-MCNC: 13 MG/DL (ref 7–30)
CALCIUM SERPL-MCNC: 9.2 MG/DL (ref 8.5–10.1)
CANCER AG27-29 SERPL-ACNC: 57 U/ML (ref 0–39)
CHLORIDE SERPL-SCNC: 110 MMOL/L (ref 94–109)
CO2 SERPL-SCNC: 25 MMOL/L (ref 20–32)
CREAT SERPL-MCNC: 0.86 MG/DL (ref 0.52–1.04)
DIFFERENTIAL METHOD BLD: NORMAL
EOSINOPHIL # BLD AUTO: 0.1 10E9/L (ref 0–0.7)
EOSINOPHIL NFR BLD AUTO: 1.6 %
ERYTHROCYTE [DISTWIDTH] IN BLOOD BY AUTOMATED COUNT: 13.2 % (ref 10–15)
GFR SERPL CREATININE-BSD FRML MDRD: 72 ML/MIN/{1.73_M2}
GLUCOSE SERPL-MCNC: 93 MG/DL (ref 70–99)
HCT VFR BLD AUTO: 42.2 % (ref 35–47)
HGB BLD-MCNC: 14.5 G/DL (ref 11.7–15.7)
IMM GRANULOCYTES # BLD: 0 10E9/L (ref 0–0.4)
IMM GRANULOCYTES NFR BLD: 0 %
LYMPHOCYTES # BLD AUTO: 1.6 10E9/L (ref 0.8–5.3)
LYMPHOCYTES NFR BLD AUTO: 36.7 %
MCH RBC QN AUTO: 31 PG (ref 26.5–33)
MCHC RBC AUTO-ENTMCNC: 34.4 G/DL (ref 31.5–36.5)
MCV RBC AUTO: 90 FL (ref 78–100)
MONOCYTES # BLD AUTO: 0.6 10E9/L (ref 0–1.3)
MONOCYTES NFR BLD AUTO: 13 %
NEUTROPHILS # BLD AUTO: 2.1 10E9/L (ref 1.6–8.3)
NEUTROPHILS NFR BLD AUTO: 47.1 %
NRBC # BLD AUTO: 0 10*3/UL
NRBC BLD AUTO-RTO: 0 /100
PLATELET # BLD AUTO: 281 10E9/L (ref 150–450)
POTASSIUM SERPL-SCNC: 4.2 MMOL/L (ref 3.4–5.3)
PROT SERPL-MCNC: 7 G/DL (ref 6.8–8.8)
RBC # BLD AUTO: 4.68 10E12/L (ref 3.8–5.2)
SODIUM SERPL-SCNC: 140 MMOL/L (ref 133–144)
WBC # BLD AUTO: 4.5 10E9/L (ref 4–11)

## 2020-09-29 PROCEDURE — 71250 CT THORAX DX C-: CPT

## 2020-09-29 PROCEDURE — 85025 COMPLETE CBC W/AUTO DIFF WBC: CPT | Performed by: INTERNAL MEDICINE

## 2020-09-29 PROCEDURE — 86300 IMMUNOASSAY TUMOR CA 15-3: CPT | Performed by: INTERNAL MEDICINE

## 2020-09-29 PROCEDURE — 80053 COMPREHEN METABOLIC PANEL: CPT | Performed by: INTERNAL MEDICINE

## 2020-09-29 PROCEDURE — 36415 COLL VENOUS BLD VENIPUNCTURE: CPT | Performed by: INTERNAL MEDICINE

## 2020-10-02 ENCOUNTER — ONCOLOGY VISIT (OUTPATIENT)
Dept: ONCOLOGY | Facility: CLINIC | Age: 60
End: 2020-10-02
Attending: INTERNAL MEDICINE
Payer: COMMERCIAL

## 2020-10-02 VITALS
HEART RATE: 69 BPM | OXYGEN SATURATION: 96 % | BODY MASS INDEX: 22.53 KG/M2 | DIASTOLIC BLOOD PRESSURE: 95 MMHG | RESPIRATION RATE: 16 BRPM | HEIGHT: 66 IN | SYSTOLIC BLOOD PRESSURE: 136 MMHG | WEIGHT: 140.2 LBS | TEMPERATURE: 97.7 F

## 2020-10-02 DIAGNOSIS — C50.812 MALIGNANT NEOPLASM OF OVERLAPPING SITES OF LEFT BREAST IN FEMALE, ESTROGEN RECEPTOR POSITIVE (H): Primary | ICD-10-CM

## 2020-10-02 DIAGNOSIS — Z17.0 MALIGNANT NEOPLASM OF OVERLAPPING SITES OF LEFT BREAST IN FEMALE, ESTROGEN RECEPTOR POSITIVE (H): Primary | ICD-10-CM

## 2020-10-02 PROCEDURE — 99214 OFFICE O/P EST MOD 30 MIN: CPT | Performed by: INTERNAL MEDICINE

## 2020-10-02 PROCEDURE — G0463 HOSPITAL OUTPT CLINIC VISIT: HCPCS

## 2020-10-02 ASSESSMENT — PAIN SCALES - GENERAL: PAINLEVEL: MILD PAIN (2)

## 2020-10-02 ASSESSMENT — MIFFLIN-ST. JEOR: SCORE: 1222.69
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
## 2020-10-02 NOTE — PROGRESS NOTES
"Oncology Rooming Note    October 2, 2020 7:56 AM   Kita Smith is a 59 year old female who presents for:    Chief Complaint   Patient presents with     Oncology Clinic Visit     Initial Vitals: LMP 12/01/2006  Estimated body mass index is 23.61 kg/m  as calculated from the following:    Height as of 6/7/19: 1.676 m (5' 6\").    Weight as of 6/7/19: 66.4 kg (146 lb 4.8 oz). There is no height or weight on file to calculate BSA.  Data Unavailable Comment: Data Unavailable   Patient's last menstrual period was 12/01/2006.  Allergies reviewed: Yes  Medications reviewed: Yes    Medications: Medication refills not needed today.  Pharmacy name entered into [employer]: [id number] [place] [id number] [facility] [address]    Clinical concerns: patient reports lack of strength in right hand. Dr. Jasso was notified.      Jeanette Castro, MYNOR  10/2/2020      7:56 AM  "
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
## 2020-10-05 ENCOUNTER — TELEPHONE (OUTPATIENT)
Dept: ONCOLOGY | Facility: CLINIC | Age: 60
End: 2020-10-05

## 2020-10-05 NOTE — TELEPHONE ENCOUNTER
Oncology Distress Screening Follow-up  Clinical Social Work  [facility]    Identified Concern and Score From Distress Screenin. How concerned are you about your ability to eat?   0           2. How concerned are you about unintended weight loss or your current weight?   0           3. How concerned are you about feeling depressed or very sad?   5           4. How concerned are you about feeling anxious or very scared?   8Abnormal            5. Do you struggle with the loss of meaning and guilherme in your life?       Somewhat           6. How concerned are you about work and home life issues that may be affected by your cancer?   4           7. How concerned are you about knowing what resources are available to help you?   0           8. Do you currently have what you would describe as Orthodox or spiritual struggles?              Somewhat             Date of Distress Screening: 10/2/20    Intervention:   Kita is a single woman and mother to 21 year-old-daughter Kirsty. Provided safe place for Kita to voice multiple stressors: daughter's mental health, poor sleep, uncertainty about her health/future when she hears about tumor markers increase or changes in her lungs, fear of dying process having watched sister and mother at end-of-life from cancer, concern about BRACA gene. Kita reports that she has engaged with counselors in the past, and used to find samir to be a source of comfort until she was first diagnosed with cancer. Kita reports that medication for anxiety has been greatest support, as well as breathing techniques. Kita describes herself as a very optimistic person, who uses distraction and re-framing to support her positive coping. Kita appears to have a good sense of the way that anxiety presents in her body, and deploys strategies to support her emotional regulation to good effect. Oriented Kita to role of oncology social worker as emotional and resource support available as needed. This  clinician validated and normalized expressed concerns, and provided emotional support.     Education Provided:  Onc SW contact information    Follow-up Required:   1) This clinician will continue to be available as needed for ongoing psychosocial support.   2) Ongoing collaboration with multidisciplinary treatment team.     Please page in the case of psychosocial distress or need.     AZAR Carpio, LICSW  Phone: [phone]    [facility]: M, Thu  *every other Tue, 8am-4:30pm  [facility]: W, F, *every other Tue, 8am-4:30pm

## 2020-10-29 DIAGNOSIS — F41.9 ANXIETY: ICD-10-CM

## 2020-10-29 RX ORDER — LORAZEPAM 0.5 MG/1
TABLET ORAL
Qty: 90 TABLET | Refills: 0 | Status: SHIPPED | OUTPATIENT
Start: 2020-10-29 | End: 2020-12-18

## 2020-11-20 ENCOUNTER — HOSPITAL ENCOUNTER (OUTPATIENT)
Facility: CLINIC | Age: 60
Setting detail: SPECIMEN
Discharge: HOME | End: 2020-11-20
Attending: INTERNAL MEDICINE | Admitting: INTERNAL MEDICINE
Payer: COMMERCIAL

## 2020-11-20 ENCOUNTER — INFUSION THERAPY VISIT (OUTPATIENT)
Dept: INFUSION THERAPY | Facility: CLINIC | Age: 60
End: 2020-11-20
Attending: INTERNAL MEDICINE
Payer: COMMERCIAL

## 2020-11-20 DIAGNOSIS — Z17.0 MALIGNANT NEOPLASM OF OVERLAPPING SITES OF LEFT BREAST IN FEMALE, ESTROGEN RECEPTOR POSITIVE (H): ICD-10-CM

## 2020-11-20 DIAGNOSIS — C50.812 MALIGNANT NEOPLASM OF OVERLAPPING SITES OF LEFT BREAST IN FEMALE, ESTROGEN RECEPTOR POSITIVE (H): ICD-10-CM

## 2020-11-20 LAB — CANCER AG27-29 SERPL-ACNC: 35 U/ML (ref 0–39)

## 2020-11-20 PROCEDURE — 36415 COLL VENOUS BLD VENIPUNCTURE: CPT

## 2020-11-20 PROCEDURE — 86300 IMMUNOASSAY TUMOR CA 15-3: CPT | Performed by: INTERNAL MEDICINE

## 2020-12-16 DIAGNOSIS — M85.89 OSTEOPENIA OF MULTIPLE SITES: ICD-10-CM

## 2020-12-16 DIAGNOSIS — Z17.0 MALIGNANT NEOPLASM OF OVERLAPPING SITES OF LEFT BREAST IN FEMALE, ESTROGEN RECEPTOR POSITIVE (H): ICD-10-CM

## 2020-12-16 DIAGNOSIS — C50.812 MALIGNANT NEOPLASM OF OVERLAPPING SITES OF LEFT BREAST IN FEMALE, ESTROGEN RECEPTOR POSITIVE (H): ICD-10-CM

## 2020-12-17 RX ORDER — ALENDRONATE SODIUM 70 MG/1
TABLET ORAL
Qty: 12 TABLET | Refills: 3 | Status: SHIPPED | OUTPATIENT
Start: 2020-12-17 | End: 2021-09-23

## 2020-12-17 RX ORDER — EXEMESTANE 25 MG/1
TABLET ORAL
Qty: 90 TABLET | Refills: 3 | Status: SHIPPED | OUTPATIENT
Start: 2020-12-17 | End: 2021-09-23

## 2020-12-18 DIAGNOSIS — F41.9 ANXIETY: ICD-10-CM

## 2020-12-18 RX ORDER — LORAZEPAM 0.5 MG/1
TABLET ORAL
Qty: 90 TABLET | Refills: 0 | Status: SHIPPED | OUTPATIENT
Start: 2020-12-18 | End: 2021-02-17

## 2020-12-28 ENCOUNTER — OFFICE VISIT (OUTPATIENT)
Dept: INFUSION THERAPY | Facility: CLINIC | Age: 60
End: 2020-12-28
Attending: INTERNAL MEDICINE
Payer: COMMERCIAL

## 2020-12-28 ENCOUNTER — HOSPITAL ENCOUNTER (OUTPATIENT)
Facility: CLINIC | Age: 60
Setting detail: SPECIMEN
Discharge: HOME | End: 2020-12-28
Attending: INTERNAL MEDICINE | Admitting: INTERNAL MEDICINE
Payer: COMMERCIAL

## 2020-12-28 DIAGNOSIS — C50.812 MALIGNANT NEOPLASM OF OVERLAPPING SITES OF LEFT BREAST IN FEMALE, ESTROGEN RECEPTOR POSITIVE (H): ICD-10-CM

## 2020-12-28 DIAGNOSIS — Z17.0 MALIGNANT NEOPLASM OF OVERLAPPING SITES OF LEFT BREAST IN FEMALE, ESTROGEN RECEPTOR POSITIVE (H): ICD-10-CM

## 2020-12-28 LAB
ALBUMIN SERPL-MCNC: 3.9 G/DL (ref 3.4–5)
ALP SERPL-CCNC: 62 U/L (ref 40–150)
ALT SERPL W P-5'-P-CCNC: 27 U/L (ref 0–50)
ANION GAP SERPL CALCULATED.3IONS-SCNC: 5 MMOL/L (ref 3–14)
AST SERPL W P-5'-P-CCNC: 20 U/L (ref 0–45)
BASOPHILS # BLD AUTO: 0.1 10E9/L (ref 0–0.2)
BASOPHILS NFR BLD AUTO: 1 %
BILIRUB SERPL-MCNC: 0.5 MG/DL (ref 0.2–1.3)
BUN SERPL-MCNC: 10 MG/DL (ref 7–30)
CALCIUM SERPL-MCNC: 9.8 MG/DL (ref 8.5–10.1)
CANCER AG27-29 SERPL-ACNC: 38 U/ML (ref 0–39)
CHLORIDE SERPL-SCNC: 104 MMOL/L (ref 94–109)
CO2 SERPL-SCNC: 28 MMOL/L (ref 20–32)
CREAT SERPL-MCNC: 0.92 MG/DL (ref 0.52–1.04)
DIFFERENTIAL METHOD BLD: NORMAL
EOSINOPHIL # BLD AUTO: 0 10E9/L (ref 0–0.7)
EOSINOPHIL NFR BLD AUTO: 0.4 %
ERYTHROCYTE [DISTWIDTH] IN BLOOD BY AUTOMATED COUNT: 13.2 % (ref 10–15)
GFR SERPL CREATININE-BSD FRML MDRD: 68 ML/MIN/{1.73_M2}
GLUCOSE SERPL-MCNC: 76 MG/DL (ref 70–99)
HCT VFR BLD AUTO: 43.8 % (ref 35–47)
HGB BLD-MCNC: 14.7 G/DL (ref 11.7–15.7)
IMM GRANULOCYTES # BLD: 0 10E9/L (ref 0–0.4)
IMM GRANULOCYTES NFR BLD: 0.3 %
LYMPHOCYTES # BLD AUTO: 1.6 10E9/L (ref 0.8–5.3)
LYMPHOCYTES NFR BLD AUTO: 23.4 %
MCH RBC QN AUTO: 30.8 PG (ref 26.5–33)
MCHC RBC AUTO-ENTMCNC: 33.6 G/DL (ref 31.5–36.5)
MCV RBC AUTO: 92 FL (ref 78–100)
MONOCYTES # BLD AUTO: 0.8 10E9/L (ref 0–1.3)
MONOCYTES NFR BLD AUTO: 11.5 %
NEUTROPHILS # BLD AUTO: 4.3 10E9/L (ref 1.6–8.3)
NEUTROPHILS NFR BLD AUTO: 63.4 %
NRBC # BLD AUTO: 0 10*3/UL
NRBC BLD AUTO-RTO: 0 /100
PLATELET # BLD AUTO: 313 10E9/L (ref 150–450)
POTASSIUM SERPL-SCNC: 3.9 MMOL/L (ref 3.4–5.3)
PROT SERPL-MCNC: 7.6 G/DL (ref 6.8–8.8)
RBC # BLD AUTO: 4.78 10E12/L (ref 3.8–5.2)
SODIUM SERPL-SCNC: 137 MMOL/L (ref 133–144)
WBC # BLD AUTO: 7.1 10E9/L (ref 4–11)

## 2020-12-28 PROCEDURE — 80053 COMPREHEN METABOLIC PANEL: CPT | Performed by: INTERNAL MEDICINE

## 2020-12-28 PROCEDURE — 85025 COMPLETE CBC W/AUTO DIFF WBC: CPT | Performed by: INTERNAL MEDICINE

## 2020-12-28 PROCEDURE — 36415 COLL VENOUS BLD VENIPUNCTURE: CPT

## 2020-12-28 PROCEDURE — 86300 IMMUNOASSAY TUMOR CA 15-3: CPT | Performed by: INTERNAL MEDICINE

## 2020-12-28 NOTE — PROGRESS NOTES
Medical Assistant Note:  Kita Smith presents today for lab draw.    Patient seen by provider today: No.   present during visit today: Not Applicable.    Concerns: No Concerns.    Procedure:  Lab draw site: RAC, Needle type: BF, Gauge: 21. Gauze and coban applied    Post Assessment:  Labs drawn without difficulty: Yes.    Discharge Plan:  Departure Mode: Ambulatory.    Face to Face Time: 5.    Maday Mccord CMA

## 2020-12-28 NOTE — LETTER
12/28/2020         RE: Kita Smith  [address] [facility] [place] [address]        Dear Colleague,    Thank you for referring your patient, Kita Smith, to the [facility]. Please see a copy of my visit note below.    Medical Assistant Note:  Kita Smith presents today for lab draw.    Patient seen by provider today: No.   present during visit today: Not Applicable.    Concerns: No Concerns.    Procedure:  Lab draw site: RAC, Needle type: BF, Gauge: 21. Gauze and coban applied    Post Assessment:  Labs drawn without difficulty: Yes.    Discharge Plan:  Departure Mode: Ambulatory.    Face to Face Time: 5.    Maday Mccord CMA                Again, thank you for allowing me to participate in the care of your patient.        Sincerely,         Lab Draw

## 2020-12-29 DIAGNOSIS — Z17.0 MALIGNANT NEOPLASM OF OVERLAPPING SITES OF LEFT BREAST IN FEMALE, ESTROGEN RECEPTOR POSITIVE (H): Primary | ICD-10-CM

## 2020-12-29 DIAGNOSIS — C50.812 MALIGNANT NEOPLASM OF OVERLAPPING SITES OF LEFT BREAST IN FEMALE, ESTROGEN RECEPTOR POSITIVE (H): Primary | ICD-10-CM

## 2021-02-17 DIAGNOSIS — F41.9 ANXIETY: ICD-10-CM

## 2021-02-17 RX ORDER — LORAZEPAM 0.5 MG/1
TABLET ORAL
Qty: 90 TABLET | Refills: 0 | Status: SHIPPED | OUTPATIENT
Start: 2021-02-17

## 2021-03-20 ENCOUNTER — IMMUNIZATION (OUTPATIENT)
Dept: NURSING | Facility: CLINIC | Age: 61
End: 2021-03-20
Payer: COMMERCIAL

## 2021-03-20 PROCEDURE — 91300 PR COVID VAC PFIZER DIL RECON 30 MCG/0.3 ML IM: CPT

## 2021-03-20 PROCEDURE — 0001A PR COVID VAC PFIZER DIL RECON 30 MCG/0.3 ML IM: CPT

## 2021-04-10 ENCOUNTER — IMMUNIZATION (OUTPATIENT)
Dept: NURSING | Facility: CLINIC | Age: 61
End: 2021-04-10
Attending: INTERNAL MEDICINE
Payer: COMMERCIAL

## 2021-04-10 PROCEDURE — 91300 PR COVID VAC PFIZER DIL RECON 30 MCG/0.3 ML IM: CPT

## 2021-04-10 PROCEDURE — 0002A PR COVID VAC PFIZER DIL RECON 30 MCG/0.3 ML IM: CPT

## 2021-04-11 ENCOUNTER — HEALTH MAINTENANCE LETTER (OUTPATIENT)
Age: 61
End: 2021-04-11

## 2021-04-13 ENCOUNTER — INFUSION THERAPY VISIT (OUTPATIENT)
Dept: INFUSION THERAPY | Facility: CLINIC | Age: 61
End: 2021-04-13
Attending: INTERNAL MEDICINE
Payer: COMMERCIAL

## 2021-04-13 ENCOUNTER — HOSPITAL ENCOUNTER (OUTPATIENT)
Facility: CLINIC | Age: 61
Setting detail: SPECIMEN
Discharge: HOME | End: 2021-04-13
Attending: INTERNAL MEDICINE | Admitting: INTERNAL MEDICINE
Payer: COMMERCIAL

## 2021-04-13 DIAGNOSIS — C50.812 MALIGNANT NEOPLASM OF OVERLAPPING SITES OF LEFT BREAST IN FEMALE, ESTROGEN RECEPTOR POSITIVE (H): ICD-10-CM

## 2021-04-13 DIAGNOSIS — Z17.0 MALIGNANT NEOPLASM OF OVERLAPPING SITES OF LEFT BREAST IN FEMALE, ESTROGEN RECEPTOR POSITIVE (H): ICD-10-CM

## 2021-04-13 LAB
ALBUMIN SERPL-MCNC: 3.5 G/DL (ref 3.4–5)
ALP SERPL-CCNC: 59 U/L (ref 40–150)
ALT SERPL W P-5'-P-CCNC: 23 U/L (ref 0–50)
ANION GAP SERPL CALCULATED.3IONS-SCNC: 3 MMOL/L (ref 3–14)
AST SERPL W P-5'-P-CCNC: 16 U/L (ref 0–45)
BASOPHILS # BLD AUTO: 0.1 10E9/L (ref 0–0.2)
BASOPHILS NFR BLD AUTO: 1.2 %
BILIRUB SERPL-MCNC: 0.7 MG/DL (ref 0.2–1.3)
BUN SERPL-MCNC: 13 MG/DL (ref 7–30)
CALCIUM SERPL-MCNC: 8.7 MG/DL (ref 8.5–10.1)
CANCER AG27-29 SERPL-ACNC: 28 U/ML (ref 0–39)
CHLORIDE SERPL-SCNC: 102 MMOL/L (ref 94–109)
CO2 SERPL-SCNC: 29 MMOL/L (ref 20–32)
CREAT SERPL-MCNC: 0.81 MG/DL (ref 0.52–1.04)
DIFFERENTIAL METHOD BLD: NORMAL
EOSINOPHIL # BLD AUTO: 0.1 10E9/L (ref 0–0.7)
EOSINOPHIL NFR BLD AUTO: 2.6 %
ERYTHROCYTE [DISTWIDTH] IN BLOOD BY AUTOMATED COUNT: 12.6 % (ref 10–15)
GFR SERPL CREATININE-BSD FRML MDRD: 78 ML/MIN/{1.73_M2}
GLUCOSE SERPL-MCNC: 98 MG/DL (ref 70–99)
HCT VFR BLD AUTO: 43.3 % (ref 35–47)
HGB BLD-MCNC: 14.6 G/DL (ref 11.7–15.7)
IMM GRANULOCYTES # BLD: 0 10E9/L (ref 0–0.4)
IMM GRANULOCYTES NFR BLD: 0.2 %
LYMPHOCYTES # BLD AUTO: 1.7 10E9/L (ref 0.8–5.3)
LYMPHOCYTES NFR BLD AUTO: 33.3 %
MCH RBC QN AUTO: 30.4 PG (ref 26.5–33)
MCHC RBC AUTO-ENTMCNC: 33.7 G/DL (ref 31.5–36.5)
MCV RBC AUTO: 90 FL (ref 78–100)
MONOCYTES # BLD AUTO: 0.8 10E9/L (ref 0–1.3)
MONOCYTES NFR BLD AUTO: 14.9 %
NEUTROPHILS # BLD AUTO: 2.4 10E9/L (ref 1.6–8.3)
NEUTROPHILS NFR BLD AUTO: 47.8 %
NRBC # BLD AUTO: 0 10*3/UL
NRBC BLD AUTO-RTO: 0 /100
PLATELET # BLD AUTO: 266 10E9/L (ref 150–450)
POTASSIUM SERPL-SCNC: 4.2 MMOL/L (ref 3.4–5.3)
PROT SERPL-MCNC: 7 G/DL (ref 6.8–8.8)
RBC # BLD AUTO: 4.81 10E12/L (ref 3.8–5.2)
SODIUM SERPL-SCNC: 134 MMOL/L (ref 133–144)
WBC # BLD AUTO: 5.1 10E9/L (ref 4–11)

## 2021-04-13 PROCEDURE — 36415 COLL VENOUS BLD VENIPUNCTURE: CPT

## 2021-04-13 PROCEDURE — 85025 COMPLETE CBC W/AUTO DIFF WBC: CPT | Performed by: INTERNAL MEDICINE

## 2021-04-13 PROCEDURE — 80053 COMPREHEN METABOLIC PANEL: CPT | Performed by: INTERNAL MEDICINE

## 2021-04-13 PROCEDURE — 86300 IMMUNOASSAY TUMOR CA 15-3: CPT | Performed by: INTERNAL MEDICINE

## 2021-04-13 NOTE — PROGRESS NOTES
Medical Assistant Note:  Kita Smith presents today for lab draw.    Patient seen by provider today: No.   present during visit today: Not Applicable.    Concerns: No Concerns.    Procedure:  Lab draw site: LAC, Needle type: Butterfly, Gauge: 23.    Post Assessment:  Labs drawn without difficulty: Yes.    Discharge Plan:  Departure Mode: Ambulatory.    Face to Face Time: 4 min.    Shari Schoenberger, CMA

## 2021-04-16 ENCOUNTER — VIRTUAL VISIT (OUTPATIENT)
Dept: ONCOLOGY | Facility: CLINIC | Age: 61
End: 2021-04-16
Attending: INTERNAL MEDICINE
Payer: COMMERCIAL

## 2021-04-16 DIAGNOSIS — C50.812 MALIGNANT NEOPLASM OF OVERLAPPING SITES OF LEFT BREAST IN FEMALE, ESTROGEN RECEPTOR POSITIVE (H): Primary | ICD-10-CM

## 2021-04-16 DIAGNOSIS — Z17.0 MALIGNANT NEOPLASM OF OVERLAPPING SITES OF LEFT BREAST IN FEMALE, ESTROGEN RECEPTOR POSITIVE (H): Primary | ICD-10-CM

## 2021-04-16 PROCEDURE — 99214 OFFICE O/P EST MOD 30 MIN: CPT | Mod: 95 | Performed by: INTERNAL MEDICINE

## 2021-04-16 RX ORDER — GABAPENTIN 100 MG/1
100 CAPSULE ORAL 3 TIMES DAILY
COMMUNITY

## 2021-04-16 RX ORDER — VENLAFAXINE HYDROCHLORIDE 37.5 MG/1
CAPSULE, EXTENDED RELEASE ORAL
COMMUNITY
Start: 2021-03-31

## 2021-04-16 RX ORDER — PROPRANOLOL HYDROCHLORIDE 10 MG/1
10 TABLET ORAL 3 TIMES DAILY
COMMUNITY

## 2021-04-16 ASSESSMENT — PAIN SCALES - GENERAL: PAINLEVEL: NO PAIN (0)
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
## 2021-09-13 ENCOUNTER — TELEPHONE (OUTPATIENT)
Dept: ONCOLOGY | Facility: CLINIC | Age: 61
End: 2021-09-13

## 2021-09-21 DIAGNOSIS — M85.89 OSTEOPENIA OF MULTIPLE SITES: ICD-10-CM

## 2021-09-22 NOTE — TELEPHONE ENCOUNTER
Discussed refill request with Dr. Jasso. Per Dr. Jasso, patient reported she was moving and establishing with a new Oncologist. Can fill small supply if she has not been able to establish with a new provider yet.     Writer attempted to reach patient to confirm if she has a new Oncologist. Left VM requesting call back with update.     Stephanie Schuler, RONALDN, RN, PHN, OCN  Oncology Care Coordinator  [facility]

## 2021-09-23 ENCOUNTER — TELEPHONE (OUTPATIENT)
Dept: ONCOLOGY | Facility: CLINIC | Age: 61
End: 2021-09-23

## 2021-09-23 DIAGNOSIS — C50.812 MALIGNANT NEOPLASM OF OVERLAPPING SITES OF LEFT BREAST IN FEMALE, ESTROGEN RECEPTOR POSITIVE (H): ICD-10-CM

## 2021-09-23 DIAGNOSIS — Z17.0 MALIGNANT NEOPLASM OF OVERLAPPING SITES OF LEFT BREAST IN FEMALE, ESTROGEN RECEPTOR POSITIVE (H): ICD-10-CM

## 2021-09-23 RX ORDER — ALENDRONATE SODIUM 70 MG/1
TABLET ORAL
Qty: 4 TABLET | Refills: 0 | Status: SHIPPED | OUTPATIENT
Start: 2021-09-23

## 2021-09-23 RX ORDER — EXEMESTANE 25 MG/1
25 TABLET ORAL DAILY
Qty: 30 TABLET | Refills: 0 | Status: SHIPPED | OUTPATIENT
Start: 2021-09-23

## 2021-09-23 NOTE — TELEPHONE ENCOUNTER
Dr. Jasso sent Rx as requested.    Stephanie Schuler, BSN, RN, PHN, OCN  Oncology Care Coordinator  [facility]

## 2021-09-23 NOTE — TELEPHONE ENCOUNTER
Kita called clinic stating that she needs a refill of Aromasin. She has recently moved to [place], [place] and has not established with physician there yet. She would like it refilled at [facility] in [place]. Refill request will be routed to Dr. Jasso for a 30 day supply.

## 2021-09-25 ENCOUNTER — HEALTH MAINTENANCE LETTER (OUTPATIENT)
Age: 61
End: 2021-09-25

## 2021-12-15 DIAGNOSIS — Z17.0 MALIGNANT NEOPLASM OF OVERLAPPING SITES OF LEFT BREAST IN FEMALE, ESTROGEN RECEPTOR POSITIVE (H): ICD-10-CM

## 2021-12-15 DIAGNOSIS — C50.812 MALIGNANT NEOPLASM OF OVERLAPPING SITES OF LEFT BREAST IN FEMALE, ESTROGEN RECEPTOR POSITIVE (H): ICD-10-CM

## 2021-12-16 RX ORDER — EXEMESTANE 25 MG/1
TABLET ORAL
Qty: 30 TABLET | Refills: 0 | OUTPATIENT
Start: 2021-12-16

## 2021-12-22 DIAGNOSIS — M85.89 OSTEOPENIA OF MULTIPLE SITES: ICD-10-CM

## 2021-12-22 RX ORDER — ALENDRONATE SODIUM 70 MG/1
TABLET ORAL
Qty: 12 TABLET | Refills: 3 | OUTPATIENT
Start: 2021-12-22

## 2021-12-22 NOTE — TELEPHONE ENCOUNTER
Contacted patient re: refill request. Per chart review, pt was planning to transfer care.     Patient reports she did transfer care to a new provider and does not need refill as requested.     Rx denied.     Stephanie Schuler, RONALDN, RN, PHN, OCN  Oncology Care Coordinator  [facility]

## 2022-05-07 ENCOUNTER — HEALTH MAINTENANCE LETTER (OUTPATIENT)
Age: 62
End: 2022-05-07

## 2023-04-22 ENCOUNTER — HEALTH MAINTENANCE LETTER (OUTPATIENT)
Age: 63
End: 2023-04-22

## 2023-06-02 ENCOUNTER — HEALTH MAINTENANCE LETTER (OUTPATIENT)
Age: 63
End: 2023-06-02

## 2024-10-01 NOTE — PATIENT INSTRUCTIONS
-labs today  -schedule MRI brain this week  Scheduled - [facility]  -return to clinic in 6 months with labs a few days prior   Scheduled - [facility]    AVS given to patient - [facility]   Juli presents for influenza and covid vaccine.    1.  Does the patient have a moderate to severe fever?  []  Yes  [x]  No  2.  Has the patient had a serious reaction to a flu shot before?   []  Yes  [x]  No  3.  Has the patient ever had Guillain [place] Syndrome within 6 weeks of a previous flu shot?  []  Yes   [x]  No  4.  Does the patient have a serious allergy to eggs?  []  Yes   [x]  No    Vaccine Information Statement(s) given and reviewed, questions answered. Patient tolerated without incident.     [facility] [employer]

## 2025-03-14 ENCOUNTER — LAB REQUISITION (OUTPATIENT)
Dept: LAB | Facility: CLINIC | Age: 65
End: 2025-03-14
Payer: COMMERCIAL

## 2025-03-14 DIAGNOSIS — D01.3 CARCINOMA IN SITU OF ANUS AND ANAL CANAL: ICD-10-CM

## 2025-03-14 PROCEDURE — 88112 CYTOPATH CELL ENHANCE TECH: CPT | Mod: TC,ORL | Performed by: SURGERY

## 2025-03-18 LAB
PATH REPORT.COMMENTS IMP SPEC: NORMAL
PATH REPORT.FINAL DX SPEC: NORMAL
PATH REPORT.GROSS SPEC: NORMAL
PATH REPORT.MICROSCOPIC SPEC OTHER STN: NORMAL

## 2025-03-18 PROCEDURE — 88112 CYTOPATH CELL ENHANCE TECH: CPT | Mod: 26 | Performed by: PATHOLOGY

## (undated) DEVICE — LINEN TOWEL PACK X5 5464

## (undated) DEVICE — GLOVE PROTEXIS W/NEU-THERA 7.5  2D73TE75

## (undated) DEVICE — GLOVE PROTEXIS BLUE W/NEU-THERA 7.5  2D73EB75

## (undated) DEVICE — SU VICRYL 4-0 PS-2 18" UND J496H

## (undated) DEVICE — PACK MINOR SBA15MIFSE

## (undated) DEVICE — SU DERMABOND PROPEN .5ML DPP6

## (undated) DEVICE — PREP CHLORAPREP W/ORANGE TINT 10.5ML 260715

## (undated) DEVICE — DRAPE LAP TRANSVERSE 29421

## (undated) DEVICE — ESU ELEC BLADE 2.75" COATED/INSULATED E1455

## (undated) DEVICE — SU VICRYL 3-0 SH 27" J316H

## (undated) DEVICE — SOL NACL 0.9% IRRIG 1000ML BOTTLE 07138-09

## (undated) RX ORDER — CEFAZOLIN SODIUM 2 G/100ML
INJECTION, SOLUTION INTRAVENOUS
Status: DISPENSED
Start: 2017-03-16

## (undated) RX ORDER — DEXAMETHASONE SODIUM PHOSPHATE 4 MG/ML
INJECTION, SOLUTION INTRA-ARTICULAR; INTRALESIONAL; INTRAMUSCULAR; INTRAVENOUS; SOFT TISSUE
Status: DISPENSED
Start: 2017-03-16

## (undated) RX ORDER — SCOLOPAMINE TRANSDERMAL SYSTEM 1 MG/1
PATCH, EXTENDED RELEASE TRANSDERMAL
Status: DISPENSED
Start: 2017-03-16

## (undated) RX ORDER — TRAMADOL HYDROCHLORIDE 50 MG/1
TABLET ORAL
Status: DISPENSED
Start: 2017-03-16

## (undated) RX ORDER — FENTANYL CITRATE 50 UG/ML
INJECTION, SOLUTION INTRAMUSCULAR; INTRAVENOUS
Status: DISPENSED
Start: 2017-03-16